# Patient Record
Sex: FEMALE | Race: WHITE | NOT HISPANIC OR LATINO | Employment: FULL TIME | URBAN - METROPOLITAN AREA
[De-identification: names, ages, dates, MRNs, and addresses within clinical notes are randomized per-mention and may not be internally consistent; named-entity substitution may affect disease eponyms.]

---

## 2024-06-27 ENCOUNTER — HOSPITAL ENCOUNTER (INPATIENT)
Facility: HOSPITAL | Age: 59
End: 2024-06-27
Attending: EMERGENCY MEDICINE | Admitting: INTERNAL MEDICINE
Payer: COMMERCIAL

## 2024-06-27 ENCOUNTER — APPOINTMENT (OUTPATIENT)
Dept: CARDIOLOGY | Facility: HOSPITAL | Age: 59
End: 2024-06-27
Payer: COMMERCIAL

## 2024-06-27 ENCOUNTER — APPOINTMENT (OUTPATIENT)
Dept: RADIOLOGY | Facility: HOSPITAL | Age: 59
End: 2024-06-27
Payer: COMMERCIAL

## 2024-06-27 DIAGNOSIS — J18.9 PRIMARY ATYPICAL PNEUMONIA: ICD-10-CM

## 2024-06-27 DIAGNOSIS — J18.9 PNEUMONIA OF LEFT LOWER LOBE DUE TO INFECTIOUS ORGANISM: ICD-10-CM

## 2024-06-27 DIAGNOSIS — R06.02 SHORTNESS OF BREATH: Primary | ICD-10-CM

## 2024-06-27 LAB
ALBUMIN SERPL BCP-MCNC: 3.1 G/DL (ref 3.4–5)
ALP SERPL-CCNC: 39 U/L (ref 33–110)
ALT SERPL W P-5'-P-CCNC: 33 U/L (ref 7–45)
ANION GAP SERPL CALC-SCNC: 11 MMOL/L (ref 10–20)
AST SERPL W P-5'-P-CCNC: 37 U/L (ref 9–39)
BASOPHILS # BLD AUTO: 0.05 X10*3/UL (ref 0–0.1)
BASOPHILS NFR BLD AUTO: 0.5 %
BILIRUB SERPL-MCNC: 0.8 MG/DL (ref 0–1.2)
BNP SERPL-MCNC: 13 PG/ML (ref 0–99)
BUN SERPL-MCNC: 18 MG/DL (ref 6–23)
CALCIUM SERPL-MCNC: 9 MG/DL (ref 8.6–10.3)
CARDIAC TROPONIN I PNL SERPL HS: 4 NG/L (ref 0–13)
CARDIAC TROPONIN I PNL SERPL HS: 4 NG/L (ref 0–13)
CHLORIDE SERPL-SCNC: 98 MMOL/L (ref 98–107)
CO2 SERPL-SCNC: 27 MMOL/L (ref 21–32)
CREAT SERPL-MCNC: 0.85 MG/DL (ref 0.5–1.05)
D DIMER PPP FEU-MCNC: 873 NG/ML FEU
EGFRCR SERPLBLD CKD-EPI 2021: 80 ML/MIN/1.73M*2
EOSINOPHIL # BLD AUTO: 0.2 X10*3/UL (ref 0–0.7)
EOSINOPHIL NFR BLD AUTO: 1.9 %
ERYTHROCYTE [DISTWIDTH] IN BLOOD BY AUTOMATED COUNT: 13.9 % (ref 11.5–14.5)
GLUCOSE SERPL-MCNC: 120 MG/DL (ref 74–99)
HCT VFR BLD AUTO: 42.5 % (ref 36–46)
HGB BLD-MCNC: 14.4 G/DL (ref 12–16)
IMM GRANULOCYTES # BLD AUTO: 0.11 X10*3/UL (ref 0–0.7)
IMM GRANULOCYTES NFR BLD AUTO: 1 % (ref 0–0.9)
INR PPP: 1.2 (ref 0.9–1.1)
LYMPHOCYTES # BLD AUTO: 1.3 X10*3/UL (ref 1.2–4.8)
LYMPHOCYTES NFR BLD AUTO: 12.2 %
MAGNESIUM SERPL-MCNC: 1.98 MG/DL (ref 1.6–2.4)
MCH RBC QN AUTO: 28.5 PG (ref 26–34)
MCHC RBC AUTO-ENTMCNC: 33.9 G/DL (ref 32–36)
MCV RBC AUTO: 84 FL (ref 80–100)
MONOCYTES # BLD AUTO: 0.94 X10*3/UL (ref 0.1–1)
MONOCYTES NFR BLD AUTO: 8.9 %
NEUTROPHILS # BLD AUTO: 8.02 X10*3/UL (ref 1.2–7.7)
NEUTROPHILS NFR BLD AUTO: 75.5 %
NRBC BLD-RTO: 0 /100 WBCS (ref 0–0)
PLATELET # BLD AUTO: 254 X10*3/UL (ref 150–450)
POTASSIUM SERPL-SCNC: 3.2 MMOL/L (ref 3.5–5.3)
PROT SERPL-MCNC: 6.5 G/DL (ref 6.4–8.2)
PROTHROMBIN TIME: 13.5 SECONDS (ref 9.8–12.8)
RBC # BLD AUTO: 5.05 X10*6/UL (ref 4–5.2)
SODIUM SERPL-SCNC: 133 MMOL/L (ref 136–145)
WBC # BLD AUTO: 10.6 X10*3/UL (ref 4.4–11.3)

## 2024-06-27 PROCEDURE — 94640 AIRWAY INHALATION TREATMENT: CPT

## 2024-06-27 PROCEDURE — 84484 ASSAY OF TROPONIN QUANT: CPT | Performed by: EMERGENCY MEDICINE

## 2024-06-27 PROCEDURE — 36415 COLL VENOUS BLD VENIPUNCTURE: CPT | Performed by: EMERGENCY MEDICINE

## 2024-06-27 PROCEDURE — 80053 COMPREHEN METABOLIC PANEL: CPT | Performed by: EMERGENCY MEDICINE

## 2024-06-27 PROCEDURE — 99285 EMERGENCY DEPT VISIT HI MDM: CPT

## 2024-06-27 PROCEDURE — 2550000001 HC RX 255 CONTRASTS: Performed by: EMERGENCY MEDICINE

## 2024-06-27 PROCEDURE — 85025 COMPLETE CBC W/AUTO DIFF WBC: CPT | Performed by: EMERGENCY MEDICINE

## 2024-06-27 PROCEDURE — 85610 PROTHROMBIN TIME: CPT | Performed by: EMERGENCY MEDICINE

## 2024-06-27 PROCEDURE — 83735 ASSAY OF MAGNESIUM: CPT

## 2024-06-27 PROCEDURE — 2500000002 HC RX 250 W HCPCS SELF ADMINISTERED DRUGS (ALT 637 FOR MEDICARE OP, ALT 636 FOR OP/ED)

## 2024-06-27 PROCEDURE — 71275 CT ANGIOGRAPHY CHEST: CPT | Performed by: STUDENT IN AN ORGANIZED HEALTH CARE EDUCATION/TRAINING PROGRAM

## 2024-06-27 PROCEDURE — 99285 EMERGENCY DEPT VISIT HI MDM: CPT | Performed by: EMERGENCY MEDICINE

## 2024-06-27 PROCEDURE — 2500000004 HC RX 250 GENERAL PHARMACY W/ HCPCS (ALT 636 FOR OP/ED)

## 2024-06-27 PROCEDURE — 93005 ELECTROCARDIOGRAM TRACING: CPT

## 2024-06-27 PROCEDURE — 71045 X-RAY EXAM CHEST 1 VIEW: CPT

## 2024-06-27 PROCEDURE — 71275 CT ANGIOGRAPHY CHEST: CPT

## 2024-06-27 PROCEDURE — 83880 ASSAY OF NATRIURETIC PEPTIDE: CPT | Performed by: EMERGENCY MEDICINE

## 2024-06-27 PROCEDURE — 85379 FIBRIN DEGRADATION QUANT: CPT | Performed by: EMERGENCY MEDICINE

## 2024-06-27 PROCEDURE — 96375 TX/PRO/DX INJ NEW DRUG ADDON: CPT

## 2024-06-27 PROCEDURE — 71045 X-RAY EXAM CHEST 1 VIEW: CPT | Performed by: RADIOLOGY

## 2024-06-27 RX ORDER — POTASSIUM CHLORIDE 20 MEQ/1
20 TABLET, EXTENDED RELEASE ORAL ONCE
Status: DISCONTINUED | OUTPATIENT
Start: 2024-06-27 | End: 2024-06-28

## 2024-06-27 RX ORDER — IPRATROPIUM BROMIDE AND ALBUTEROL SULFATE 2.5; .5 MG/3ML; MG/3ML
9 SOLUTION RESPIRATORY (INHALATION) ONCE
Status: COMPLETED | OUTPATIENT
Start: 2024-06-27 | End: 2024-06-27

## 2024-06-27 ASSESSMENT — PAIN - FUNCTIONAL ASSESSMENT: PAIN_FUNCTIONAL_ASSESSMENT: 0-10

## 2024-06-27 ASSESSMENT — PAIN SCALES - GENERAL: PAINLEVEL_OUTOF10: 0 - NO PAIN

## 2024-06-27 ASSESSMENT — COLUMBIA-SUICIDE SEVERITY RATING SCALE - C-SSRS
6. HAVE YOU EVER DONE ANYTHING, STARTED TO DO ANYTHING, OR PREPARED TO DO ANYTHING TO END YOUR LIFE?: NO
2. HAVE YOU ACTUALLY HAD ANY THOUGHTS OF KILLING YOURSELF?: NO
1. IN THE PAST MONTH, HAVE YOU WISHED YOU WERE DEAD OR WISHED YOU COULD GO TO SLEEP AND NOT WAKE UP?: NO

## 2024-06-27 ASSESSMENT — LIFESTYLE VARIABLES
HAVE YOU EVER FELT YOU SHOULD CUT DOWN ON YOUR DRINKING: NO
TOTAL SCORE: 0
EVER HAD A DRINK FIRST THING IN THE MORNING TO STEADY YOUR NERVES TO GET RID OF A HANGOVER: NO
HAVE PEOPLE ANNOYED YOU BY CRITICIZING YOUR DRINKING: NO
EVER FELT BAD OR GUILTY ABOUT YOUR DRINKING: NO

## 2024-06-28 ENCOUNTER — APPOINTMENT (OUTPATIENT)
Dept: RADIOLOGY | Facility: HOSPITAL | Age: 59
End: 2024-06-28
Payer: COMMERCIAL

## 2024-06-28 ENCOUNTER — APPOINTMENT (OUTPATIENT)
Dept: CARDIOLOGY | Facility: HOSPITAL | Age: 59
End: 2024-06-28
Payer: COMMERCIAL

## 2024-06-28 PROBLEM — J18.9 PRIMARY ATYPICAL PNEUMONIA: Status: ACTIVE | Noted: 2024-06-28

## 2024-06-28 PROBLEM — J96.01 ACUTE HYPOXEMIC RESPIRATORY FAILURE (MULTI): Status: ACTIVE | Noted: 2024-06-28

## 2024-06-28 PROBLEM — R06.02 SHORTNESS OF BREATH: Status: ACTIVE | Noted: 2024-06-28

## 2024-06-28 PROBLEM — N28.89 RENAL MASS: Status: ACTIVE | Noted: 2024-06-28

## 2024-06-28 PROBLEM — R06.00 DYSPNEA: Status: ACTIVE | Noted: 2024-06-28

## 2024-06-28 LAB
ALBUMIN SERPL BCP-MCNC: 3.3 G/DL (ref 3.4–5)
ALP SERPL-CCNC: 39 U/L (ref 33–110)
ALT SERPL W P-5'-P-CCNC: 30 U/L (ref 7–45)
ANION GAP SERPL CALC-SCNC: 13 MMOL/L (ref 10–20)
AST SERPL W P-5'-P-CCNC: 31 U/L (ref 9–39)
ATRIAL RATE: 74 BPM
ATRIAL RATE: 79 BPM
BILIRUB SERPL-MCNC: 0.6 MG/DL (ref 0–1.2)
BUN SERPL-MCNC: 14 MG/DL (ref 6–23)
CALCIUM SERPL-MCNC: 8.7 MG/DL (ref 8.6–10.3)
CHLORIDE SERPL-SCNC: 101 MMOL/L (ref 98–107)
CO2 SERPL-SCNC: 26 MMOL/L (ref 21–32)
CREAT SERPL-MCNC: 0.71 MG/DL (ref 0.5–1.05)
EGFRCR SERPLBLD CKD-EPI 2021: >90 ML/MIN/1.73M*2
ERYTHROCYTE [DISTWIDTH] IN BLOOD BY AUTOMATED COUNT: 14 % (ref 11.5–14.5)
GLUCOSE SERPL-MCNC: 168 MG/DL (ref 74–99)
HCT VFR BLD AUTO: 37.5 % (ref 36–46)
HGB BLD-MCNC: 13.7 G/DL (ref 12–16)
MCH RBC QN AUTO: 31.6 PG (ref 26–34)
MCHC RBC AUTO-ENTMCNC: 36.5 G/DL (ref 32–36)
MCV RBC AUTO: 86 FL (ref 80–100)
NRBC BLD-RTO: 0 /100 WBCS (ref 0–0)
P AXIS: 52 DEGREES
P AXIS: 60 DEGREES
P OFFSET: 198 MS
P OFFSET: 199 MS
P ONSET: 147 MS
P ONSET: 150 MS
PLATELET # BLD AUTO: 243 X10*3/UL (ref 150–450)
POTASSIUM SERPL-SCNC: 3.7 MMOL/L (ref 3.5–5.3)
PR INTERVAL: 142 MS
PR INTERVAL: 146 MS
PROT SERPL-MCNC: 6.5 G/DL (ref 6.4–8.2)
Q ONSET: 218 MS
Q ONSET: 223 MS
QRS COUNT: 12 BEATS
QRS COUNT: 13 BEATS
QRS DURATION: 76 MS
QRS DURATION: 84 MS
QT INTERVAL: 382 MS
QT INTERVAL: 392 MS
QTC CALCULATION(BAZETT): 424 MS
QTC CALCULATION(BAZETT): 449 MS
QTC FREDERICIA: 410 MS
QTC FREDERICIA: 429 MS
R AXIS: -11 DEGREES
R AXIS: -9 DEGREES
RBC # BLD AUTO: 4.34 X10*6/UL (ref 4–5.2)
SODIUM SERPL-SCNC: 136 MMOL/L (ref 136–145)
T AXIS: 18 DEGREES
T AXIS: 23 DEGREES
T OFFSET: 414 MS
T OFFSET: 414 MS
VENTRICULAR RATE: 74 BPM
VENTRICULAR RATE: 79 BPM
WBC # BLD AUTO: 7.8 X10*3/UL (ref 4.4–11.3)

## 2024-06-28 PROCEDURE — 87449 NOS EACH ORGANISM AG IA: CPT | Mod: STJLAB | Performed by: INTERNAL MEDICINE

## 2024-06-28 PROCEDURE — 2500000004 HC RX 250 GENERAL PHARMACY W/ HCPCS (ALT 636 FOR OP/ED): Performed by: INTERNAL MEDICINE

## 2024-06-28 PROCEDURE — 96361 HYDRATE IV INFUSION ADD-ON: CPT

## 2024-06-28 PROCEDURE — 74170 CT ABD WO CNTRST FLWD CNTRST: CPT | Performed by: RADIOLOGY

## 2024-06-28 PROCEDURE — 87040 BLOOD CULTURE FOR BACTERIA: CPT | Mod: STJLAB | Performed by: INTERNAL MEDICINE

## 2024-06-28 PROCEDURE — 96365 THER/PROPH/DIAG IV INF INIT: CPT | Mod: 59

## 2024-06-28 PROCEDURE — 2500000001 HC RX 250 WO HCPCS SELF ADMINISTERED DRUGS (ALT 637 FOR MEDICARE OP): Performed by: EMERGENCY MEDICINE

## 2024-06-28 PROCEDURE — 96375 TX/PRO/DX INJ NEW DRUG ADDON: CPT

## 2024-06-28 PROCEDURE — 85027 COMPLETE CBC AUTOMATED: CPT | Performed by: INTERNAL MEDICINE

## 2024-06-28 PROCEDURE — 2580000001 HC RX 258 IV SOLUTIONS: Performed by: INTERNAL MEDICINE

## 2024-06-28 PROCEDURE — 2550000001 HC RX 255 CONTRASTS: Performed by: INTERNAL MEDICINE

## 2024-06-28 PROCEDURE — 94640 AIRWAY INHALATION TREATMENT: CPT

## 2024-06-28 PROCEDURE — 2500000002 HC RX 250 W HCPCS SELF ADMINISTERED DRUGS (ALT 637 FOR MEDICARE OP, ALT 636 FOR OP/ED): Performed by: INTERNAL MEDICINE

## 2024-06-28 PROCEDURE — 2500000004 HC RX 250 GENERAL PHARMACY W/ HCPCS (ALT 636 FOR OP/ED): Performed by: EMERGENCY MEDICINE

## 2024-06-28 PROCEDURE — 74170 CT ABD WO CNTRST FLWD CNTRST: CPT

## 2024-06-28 PROCEDURE — 93005 ELECTROCARDIOGRAM TRACING: CPT

## 2024-06-28 PROCEDURE — 1200000002 HC GENERAL ROOM WITH TELEMETRY DAILY

## 2024-06-28 PROCEDURE — 99223 1ST HOSP IP/OBS HIGH 75: CPT | Performed by: INTERNAL MEDICINE

## 2024-06-28 PROCEDURE — 2500000004 HC RX 250 GENERAL PHARMACY W/ HCPCS (ALT 636 FOR OP/ED)

## 2024-06-28 PROCEDURE — 2500000005 HC RX 250 GENERAL PHARMACY W/O HCPCS

## 2024-06-28 PROCEDURE — 80053 COMPREHEN METABOLIC PANEL: CPT | Performed by: INTERNAL MEDICINE

## 2024-06-28 PROCEDURE — 87899 AGENT NOS ASSAY W/OPTIC: CPT | Mod: STJLAB | Performed by: INTERNAL MEDICINE

## 2024-06-28 RX ORDER — SODIUM CHLORIDE AND POTASSIUM CHLORIDE 300; 900 MG/100ML; MG/100ML
75 INJECTION, SOLUTION INTRAVENOUS CONTINUOUS
Status: DISPENSED | OUTPATIENT
Start: 2024-06-28 | End: 2024-06-28

## 2024-06-28 RX ORDER — IPRATROPIUM BROMIDE AND ALBUTEROL SULFATE 2.5; .5 MG/3ML; MG/3ML
3 SOLUTION RESPIRATORY (INHALATION)
Status: DISCONTINUED | OUTPATIENT
Start: 2024-06-28 | End: 2024-06-30

## 2024-06-28 RX ORDER — PANTOPRAZOLE SODIUM 40 MG/10ML
40 INJECTION, POWDER, LYOPHILIZED, FOR SOLUTION INTRAVENOUS
Status: DISPENSED | OUTPATIENT
Start: 2024-06-28

## 2024-06-28 RX ORDER — ACETAMINOPHEN 325 MG/1
650 TABLET ORAL EVERY 4 HOURS PRN
Status: ACTIVE | OUTPATIENT
Start: 2024-06-28

## 2024-06-28 RX ORDER — ACETAMINOPHEN 160 MG/5ML
650 SOLUTION ORAL EVERY 4 HOURS PRN
Status: ACTIVE | OUTPATIENT
Start: 2024-06-28

## 2024-06-28 RX ORDER — TALC
3 POWDER (GRAM) TOPICAL NIGHTLY PRN
Status: ACTIVE | OUTPATIENT
Start: 2024-06-28

## 2024-06-28 RX ORDER — CEFTRIAXONE 1 G/50ML
1 INJECTION, SOLUTION INTRAVENOUS EVERY 24 HOURS
Status: DISPENSED | OUTPATIENT
Start: 2024-06-29

## 2024-06-28 RX ORDER — ENOXAPARIN SODIUM 100 MG/ML
40 INJECTION SUBCUTANEOUS EVERY 24 HOURS
Status: DISPENSED | OUTPATIENT
Start: 2024-06-28

## 2024-06-28 RX ORDER — PANTOPRAZOLE SODIUM 40 MG/1
40 TABLET, DELAYED RELEASE ORAL
Status: DISPENSED | OUTPATIENT
Start: 2024-06-28

## 2024-06-28 RX ORDER — ONDANSETRON HYDROCHLORIDE 2 MG/ML
4 INJECTION, SOLUTION INTRAVENOUS EVERY 8 HOURS PRN
Status: ACTIVE | OUTPATIENT
Start: 2024-06-28

## 2024-06-28 RX ORDER — CEFTRIAXONE 1 G/50ML
1 INJECTION, SOLUTION INTRAVENOUS ONCE
Status: COMPLETED | OUTPATIENT
Start: 2024-06-28 | End: 2024-06-28

## 2024-06-28 RX ORDER — LORAZEPAM 2 MG/ML
2 INJECTION INTRAMUSCULAR ONCE
Status: DISCONTINUED | OUTPATIENT
Start: 2024-06-28 | End: 2024-06-28

## 2024-06-28 RX ORDER — POTASSIUM CHLORIDE 20 MEQ/1
40 TABLET, EXTENDED RELEASE ORAL ONCE
Status: DISPENSED | OUTPATIENT
Start: 2024-06-28

## 2024-06-28 RX ORDER — GUAIFENESIN/DEXTROMETHORPHAN 100-10MG/5
5 SYRUP ORAL EVERY 4 HOURS PRN
Status: DISPENSED | OUTPATIENT
Start: 2024-06-28

## 2024-06-28 RX ORDER — ACETAMINOPHEN 650 MG/1
650 SUPPOSITORY RECTAL EVERY 4 HOURS PRN
Status: ACTIVE | OUTPATIENT
Start: 2024-06-28

## 2024-06-28 RX ORDER — METOCLOPRAMIDE 10 MG/1
10 TABLET ORAL EVERY 6 HOURS PRN
Status: ACTIVE | OUTPATIENT
Start: 2024-06-28

## 2024-06-28 RX ORDER — METOCLOPRAMIDE HYDROCHLORIDE 5 MG/ML
10 INJECTION INTRAMUSCULAR; INTRAVENOUS EVERY 6 HOURS PRN
Status: ACTIVE | OUTPATIENT
Start: 2024-06-28

## 2024-06-28 RX ORDER — CODEINE PHOSPHATE AND GUAIFENESIN 10; 100 MG/5ML; MG/5ML
5 SOLUTION ORAL EVERY 4 HOURS PRN
Status: DISPENSED | OUTPATIENT
Start: 2024-06-28

## 2024-06-28 RX ORDER — POTASSIUM CHLORIDE 1.5 G/1.58G
20 POWDER, FOR SOLUTION ORAL ONCE
Status: COMPLETED | OUTPATIENT
Start: 2024-06-28 | End: 2024-06-28

## 2024-06-28 RX ORDER — ONDANSETRON 4 MG/1
4 TABLET, ORALLY DISINTEGRATING ORAL EVERY 8 HOURS PRN
Status: ACTIVE | OUTPATIENT
Start: 2024-06-28

## 2024-06-28 RX ORDER — POLYETHYLENE GLYCOL 3350 17 G/17G
17 POWDER, FOR SOLUTION ORAL DAILY PRN
Status: ACTIVE | OUTPATIENT
Start: 2024-06-28

## 2024-06-28 SDOH — SOCIAL STABILITY: SOCIAL INSECURITY: ARE THERE ANY APPARENT SIGNS OF INJURIES/BEHAVIORS THAT COULD BE RELATED TO ABUSE/NEGLECT?: NO

## 2024-06-28 SDOH — SOCIAL STABILITY: SOCIAL INSECURITY: DO YOU FEEL ANYONE HAS EXPLOITED OR TAKEN ADVANTAGE OF YOU FINANCIALLY OR OF YOUR PERSONAL PROPERTY?: NO

## 2024-06-28 SDOH — SOCIAL STABILITY: SOCIAL INSECURITY: ARE YOU OR HAVE YOU BEEN THREATENED OR ABUSED PHYSICALLY, EMOTIONALLY, OR SEXUALLY BY ANYONE?: NO

## 2024-06-28 SDOH — SOCIAL STABILITY: SOCIAL INSECURITY: HAS ANYONE EVER THREATENED TO HURT YOUR FAMILY OR YOUR PETS?: NO

## 2024-06-28 SDOH — SOCIAL STABILITY: SOCIAL INSECURITY: DO YOU FEEL UNSAFE GOING BACK TO THE PLACE WHERE YOU ARE LIVING?: NO

## 2024-06-28 SDOH — SOCIAL STABILITY: SOCIAL INSECURITY: ABUSE: ADULT

## 2024-06-28 SDOH — SOCIAL STABILITY: SOCIAL INSECURITY: DOES ANYONE TRY TO KEEP YOU FROM HAVING/CONTACTING OTHER FRIENDS OR DOING THINGS OUTSIDE YOUR HOME?: NO

## 2024-06-28 SDOH — SOCIAL STABILITY: SOCIAL INSECURITY: HAVE YOU HAD THOUGHTS OF HARMING ANYONE ELSE?: NO

## 2024-06-28 SDOH — SOCIAL STABILITY: SOCIAL INSECURITY: WERE YOU ABLE TO COMPLETE ALL THE BEHAVIORAL HEALTH SCREENINGS?: YES

## 2024-06-28 SDOH — SOCIAL STABILITY: SOCIAL INSECURITY: HAVE YOU HAD ANY THOUGHTS OF HARMING ANYONE ELSE?: NO

## 2024-06-28 ASSESSMENT — COGNITIVE AND FUNCTIONAL STATUS - GENERAL
WALKING IN HOSPITAL ROOM: A LITTLE
MOBILITY SCORE: 23
CLIMB 3 TO 5 STEPS WITH RAILING: A LITTLE
DAILY ACTIVITIY SCORE: 24
MOBILITY SCORE: 22
WALKING IN HOSPITAL ROOM: A LITTLE
WALKING IN HOSPITAL ROOM: A LITTLE
CLIMB 3 TO 5 STEPS WITH RAILING: A LITTLE
MOBILITY SCORE: 22
DAILY ACTIVITIY SCORE: 24
CLIMB 3 TO 5 STEPS WITH RAILING: A LITTLE
CLIMB 3 TO 5 STEPS WITH RAILING: A LITTLE
DAILY ACTIVITIY SCORE: 24
MOBILITY SCORE: 22
DAILY ACTIVITIY SCORE: 24
PATIENT BASELINE BEDBOUND: NO

## 2024-06-28 ASSESSMENT — LIFESTYLE VARIABLES
SKIP TO QUESTIONS 9-10: 1
HOW MANY STANDARD DRINKS CONTAINING ALCOHOL DO YOU HAVE ON A TYPICAL DAY: PATIENT DOES NOT DRINK
HOW OFTEN DO YOU HAVE A DRINK CONTAINING ALCOHOL: NEVER
AUDIT-C TOTAL SCORE: 0
AUDIT-C TOTAL SCORE: 0
HOW OFTEN DO YOU HAVE 6 OR MORE DRINKS ON ONE OCCASION: NEVER

## 2024-06-28 ASSESSMENT — ACTIVITIES OF DAILY LIVING (ADL)
TOILETING: INDEPENDENT
WALKS IN HOME: INDEPENDENT
HEARING - RIGHT EAR: FUNCTIONAL
BATHING: INDEPENDENT
HEARING - LEFT EAR: FUNCTIONAL
PATIENT'S MEMORY ADEQUATE TO SAFELY COMPLETE DAILY ACTIVITIES?: YES
DRESSING YOURSELF: INDEPENDENT
FEEDING YOURSELF: INDEPENDENT
ADEQUATE_TO_COMPLETE_ADL: YES
JUDGMENT_ADEQUATE_SAFELY_COMPLETE_DAILY_ACTIVITIES: YES
LACK_OF_TRANSPORTATION: NO
GROOMING: INDEPENDENT

## 2024-06-28 ASSESSMENT — PATIENT HEALTH QUESTIONNAIRE - PHQ9
1. LITTLE INTEREST OR PLEASURE IN DOING THINGS: NOT AT ALL
2. FEELING DOWN, DEPRESSED OR HOPELESS: NOT AT ALL
SUM OF ALL RESPONSES TO PHQ9 QUESTIONS 1 & 2: 0

## 2024-06-28 ASSESSMENT — ENCOUNTER SYMPTOMS
SHORTNESS OF BREATH: 1
COUGH: 1

## 2024-06-28 ASSESSMENT — PAIN - FUNCTIONAL ASSESSMENT
PAIN_FUNCTIONAL_ASSESSMENT: 0-10
PAIN_FUNCTIONAL_ASSESSMENT: 0-10

## 2024-06-28 ASSESSMENT — PAIN SCALES - GENERAL
PAINLEVEL_OUTOF10: 0 - NO PAIN

## 2024-06-28 NOTE — SIGNIFICANT EVENT
Patient pulse ox in 80s on Ra patient complains of sob. Patient placed on 3l of oxygen via nc pulse ox up to 93% patient no longer complains of sob

## 2024-06-28 NOTE — ED PROVIDER NOTES
HPI   Chief Complaint   Patient presents with   • Shortness of Breath     Tested + for covid last week, endorsing SOB since Thursday; states in triage that she has had constant chest pressure that started Thursday as well         This is a 58 years old female patient presented to the emergency department with complaint of difficulty breathing/shortness of breath and constant chest pressure as if someone is sitting on her chest.  Symptoms started on Thursday.  She tested positive for COVID-19 a week ago.  Denies any headache, lightheadedness, dizziness, fever, chills, body aches, abdominal pain, diarrhea, constipation, dysuria, urgency, frequency, weakness or focal numbness.  She reported chest congestion.  Patient also reported feeling generally weak and having very low energy.    Review of system: As stated above in the HPI section.                              Allan Coma Scale Score: 15                     Patient History   History reviewed. No pertinent past medical history.  History reviewed. No pertinent surgical history.  No family history on file.  Social History     Tobacco Use   • Smoking status: Every Day     Types: Cigarettes   • Smokeless tobacco: Never   Vaping Use   • Vaping status: Never Used   Substance Use Topics   • Alcohol use: Yes     Comment: socially   • Drug use: Yes     Types: Marijuana       Physical Exam   ED Triage Vitals   Temperature Heart Rate Respirations BP   06/27/24 2105 06/27/24 2105 06/27/24 2105 06/27/24 2105   35.7 °C (96.3 °F) 78 20 109/61      Pulse Ox Temp Source Heart Rate Source Patient Position   06/27/24 2105 06/27/24 2105 -- 06/27/24 2200   95 % Temporal  Sitting      BP Location FiO2 (%)     -- --             Physical Exam  Vitals and nursing note reviewed.   Constitutional:       General: She is not in acute distress.     Appearance: She is well-developed.   HENT:      Head: Normocephalic and atraumatic.   Eyes:      Conjunctiva/sclera: Conjunctivae normal.    Cardiovascular:      Rate and Rhythm: Normal rate and regular rhythm.      Heart sounds: No murmur heard.  Pulmonary:      Effort: Pulmonary effort is normal. No respiratory distress.      Breath sounds: Examination of the right-lower field reveals wheezing and rhonchi. Examination of the left-lower field reveals wheezing and rhonchi. Wheezing and rhonchi present.   Abdominal:      Palpations: Abdomen is soft.      Tenderness: There is no abdominal tenderness.   Musculoskeletal:         General: No swelling.      Cervical back: Neck supple.   Skin:     General: Skin is warm and dry.      Capillary Refill: Capillary refill takes less than 2 seconds.   Neurological:      Mental Status: She is alert.   Psychiatric:         Mood and Affect: Mood normal.         ED Course & MDM   ED Course as of 06/28/24 0103 Fri Jun 28, 2024   0012 CBC with Differential(!) [SC]   0013 ERYTHROCYTES (10*6/UL) IN BLOOD BY AUTOMATED COUNT, [place]: 5.05 [SC]   0013 Brain Natriuretic Peptide [SC]   0013 CBC with Differential(!) [SC]   0013 LEUKOCYTES (10*3/UL) IN BLOOD BY AUTOMATED COUNT, [place]: 10.6 [SC]   0013 ERYTHROCYTES (10*6/UL) IN BLOOD BY AUTOMATED COUNT, [place]: 5.05 [SC]   0013 HEMATOCRIT: 42.5 [SC]   0013 MCH: 28.5 [SC]   0013 MCHC: 33.9 [SC]   0013 RED CELL DISTRIBUTION WIDTH: 13.9 [SC]   0013 NEUTROPHILS/100 LEUKOCYTES IN BLOOD BY AUTOMATED COUNT, [place]: 75.5 [SC]   0013 INR(!): 1.2 [SC]   0013 Protime(!): 13.5 [SC]   0013 GLUCOSE(!): 120 [SC]   0013 SODIUM(!): 133 [SC]   0013 Anion Gap: 11 [SC]   0013 Troponin I, High Sensitivity: 4 [SC]      ED Course User Index  [SC] Nia Farmer MD         Diagnoses as of 06/28/24 0103   Shortness of breath       Medical Decision Making  Patient is seen and examined.  Given the recent positive COVID-19 status as well as the chest pain and shortness of breath will obtain full cardiac workup including D-dimer.  D-dimer is elevated.  Will proceed with a CT PE protocol.   Meanwhile we will administer breathing treatment and steroids. Will replenish hypokalemia and will administer IV fluids.  Patient is requiring 4 L of oxygen by nasal cannula which is new.  CT chest revealed finding that could be related to consolidation/pneumonia, pulmonary infarction and the study was not optimal for PE.  Incidentally renal mass of 3.5 cm is noted.  Given the patient's generalized weakness, new oxygen requirement we will admit the patient after we start the patient on Rocephin, azithromycin.  Patient is admitted in stable condition        Procedure  Procedures     Sidney Jones DO  06/28/24 0103

## 2024-06-28 NOTE — CARE PLAN
Problem: Fall/Injury  Goal: Not fall by end of shift  Outcome: Progressing     Problem: Respiratory  Goal: Patent airway maintained this shift  Outcome: Progressing    The patient's goals for the shift include      The clinical goals for the shift include maintain SpO2 > 92% on 4L NC throughout end of shift

## 2024-06-28 NOTE — CARE PLAN
Problem: Respiratory  Goal: Minimal/no exertional discomfort or dyspnea this shift  6/28/2024 1726 by Tia Cowan RN  Outcome: Progressing  6/28/2024 1004 by Tia Cowan RN  Flowsheets (Taken 6/28/2024 1004)  Minimal/no exertional discomfort or dyspnea this shift: Positioning to promote ventilation/comfort  Goal: No signs of respiratory distress (eg. Use of accessory muscles. Peds grunting)  Outcome: Progressing  Goal: Verbalize decreased shortness of breath this shift  Outcome: Progressing  Goal: Wean oxygen to maintain O2 saturation per order/standard this shift  Outcome: Progressing     Problem: Fall/Injury  Goal: Not fall by end of shift  Outcome: Met   The patient's goals for the shift include      The clinical goals for the shift include care plan    Over the shift, the patient did make progress toward the following goals.

## 2024-06-28 NOTE — NURSING NOTE
0740 Patient being transferred of floor for ct of kidney   0810 patient returned to unit   1028 patient wanting to eat, asking if pulmonology will see her soon.  secure message sent to Dr. Ng regarding if there is plans for bronchoscopy for this patient for this shift, and if patient should remain npo,  awaiting response from Dr. Ng.   9853 Dr. Mcmahon placed diet order for patient since bronchoscopy would have to be planned in advance.

## 2024-06-28 NOTE — CONSULTS
Pulmonary Disease Consult    PATIENT NAME: Kika Marcelino    MRN: [id number]  SERVICE DATE:  6/28/2024   SERVICE TIME:  4:59 PM        PRIMARY CARE PHYSICIAN: No Assigned PCP Generic MD Vitaliy  REASON FOR CONSULT: atypical PNA - recent COVID. Consolidation with reverse halo sign - fungal vs mycobacterial pna?       ASSESSMENT :   Acute hypoxic respiratory failure  Multifocal lobar pneumonia  Left lower lobe large consolidation with halo sign with multiple differential diagnosis as per radiology report  Tobacco use disorder with signs of bilateral wheezing, suggestive of coexisting obstructive airway disease  However no definite evidence of cavity or necrotizing pneumonia yet  COVID-pneumonia  Patient is not immune compromised    Plan  Continue with bronchodilators DuoNeb scheduled dose  Continue the current antibiotics as per ID  Continue with oxygen to keep O2 sat 89 to 92% all the time  Patient is presenting with acute respiratory illness that is more suggestive of infectious etiology at this time  There is no necrotizing or cavitation that necessitate further invasive procedures at this time  Will continue with antibiotics  Will consider adding steroids after sputum cultures    Thanks for consult we will follow-up    1. Large reverse-halo sign airspace disease (consolidative opacity  with central ground-glass opacity) in the lingula. Differential  diagnosis consists of pulmonary infarct, fungal pneumonia (most  commonly mucormycosis), other pneumonia including COVID-19 and  bacterial pneumonia amongst others. Note that the there is  nondiagnostic evaluation for pulmonary embolism within the lingula  secondary to extensive cardiac and respiratory motion artifact. There  is also nondiagnostic evaluation for pulmonary embolism at and distal  to the segmental level in the right middle lobe to the same.  Otherwise, no acute pulmonary embolism through the segmental level  elsewhere.      2. Diffuse centrilobular  ground-glass nodules and opacities within  the upper lobes and patchy involvement in the lower lobes (left >  right) that is highly atypical for COVID-19 and is most suggestive in  pattern and distribution for bacterial pneumonia or atypical  organisms such as mycobacterial. Integration of the clinical context,  physical exam/laboratory findings, and imaging is recommended.      3. Suspicion for solid enhancing right renal mass measuring 3.3 cm x  3.1 cm; however, this is only partially visualized and suboptimally  evaluated. Dedicated contrast enhanced MRI of the kidneys is  recommended for further evaluation. YELLOW ALERT: An incidental  finding alert was sent per protocol.      4. Cholelithiasis without evidence of acute cholecystitis.      MACRO:  Critical Finding:  New mass in the kidney. Notification was initiated  on 6/28/2024 at 12:35 am by  Wicho Dailey.  (**-YCF-**)  Instructions:  MR Abdomen w and wo IV contrast. 1 week.      Signed by: Wicho Dailey 6/28/2024 12:36 AM      HPI    Patient Kika is a 58-year-old female who is a smoker, including marijuana, she has office work, she denied having any history of underlying chronic lung disease or reflux disease.  She endorses a positive COVID-19 diagnosis 1 week from the day of admission. States that she began to experience symptoms including cough, malaise, chest congestion with coworkers having tested positive as well as their direct family members. She states that she took 2 home COVID-19 tests, both of which were noted to be positive.  Upon presentation to the ED she was found to be hypoxic and placed on oxygen.  CT chest was done and showed multilobar pneumonia and some local consolidation in the left lower lobe with halo sign and consult requested.  Her illness is around 7 to 8 days total otherwise she denied any underlying subacute or chronic respiratory symptoms.  No history of occupational or environmental exposure.  No history of autoimmune  or connective tissue disease.  She is not immune compromised.  She has hoarseness of voice for the last few days which could be contributing to reflux disease even though she denied heartburn or choking or aspiration with food.  She denied postnasal drip or sore throat  No fever no chills now but she had earlier in the course of her illness    PAST MEDICAL HISTORY:   Patient denied any other comorbidities  PAST SURGICAL HISTORY:       FAMILY HISTORY:   No history of chronic lung disease    SOCIAL HISTORY:   Social History     Tobacco Use    Smoking status: Every Day     Types: Cigarettes    Smokeless tobacco: Never   Vaping Use    Vaping status: Never Used   Substance Use Topics    Alcohol use: Yes     Comment: socially    Drug use: Yes     Types: Marijuana     CURRENT ALLERGIES:   Allergies as of 06/27/2024    (No Known Allergies)       MEDICATIONS:  Scheduled medications  [START ON 6/29/2024] azithromycin, 500 mg, intravenous, q24h  [START ON 6/29/2024] cefTRIAXone, 1 g, intravenous, q24h  enoxaparin, 40 mg, subcutaneous, q24h  ipratropium-albuteroL, 3 mL, nebulization, q4h  pantoprazole, 40 mg, oral, Daily before breakfast   Or  pantoprazole, 40 mg, intravenous, Daily before breakfast  potassium chloride CR, 40 mEq, oral, Once      Continuous medications  potassium chloride in 0.9%NaCl, 75 mL/hr, Last Rate: 75 mL/hr (06/28/24 0837)      PRN medications  PRN medications: acetaminophen **OR** acetaminophen **OR** acetaminophen, acetaminophen **OR** acetaminophen **OR** acetaminophen, codeine-guaifenesin, dextromethorphan-guaifenesin, melatonin, metoclopramide **OR** metoclopramide, ondansetron ODT **OR** ondansetron, oxygen, polyethylene glycol      ROS  Review of Systems      Physical Exam  Vitals reviewed.   Constitutional:       Appearance: Normal appearance. She is obese.  On oxygen continuously  HENT:      Head: Normocephalic and atraumatic.   Eyes:      Extraocular Movements: Extraocular movements intact.      " Pupils: Pupils are equal, round, and reactive to light.   Cardiovascular:      Rate and Rhythm: Normal rate and regular rhythm.      Heart sounds: No murmur heard.  Pulmonary:      Effort: Mild respiratory distress present.      Breath sounds: No stridor. Wheezing and rhonchi present.   Chest:      Chest wall: No tenderness.   Abdominal:      General: Bowel sounds are normal. There is no distension.      Palpations: There is no mass.      Tenderness: There is no abdominal tenderness. There is no rebound.   Musculoskeletal:         General: Swelling present. No tenderness or deformity.      Cervical back: No rigidity.   Lymphadenopathy:      Cervical: No cervical adenopathy.   Skin:     General: Skin is warm.      Coloration: Skin is pale. Skin is not jaundiced.      Findings: No bruising.   Neurological:      General: No focal deficit present.      Mental Status: She is alert and oriented to person, place, and time. Mental status is at baseline.      Cranial Nerves: No cranial nerve deficit.      Motor: Weakness present.   Psychiatric:         Mood and Affect: Mood normal.         Behavior: Behavior normal.     Patient Vitals for the past 24 hrs:   BP Temp Temp src Pulse Resp SpO2 Height Weight   06/28/24 1621 -- -- -- -- -- 95 % -- --   06/28/24 1600 130/87 36.6 °C (97.9 °F) Temporal 69 18 96 % -- --   06/28/24 1229 -- -- -- -- -- 96 % -- --   06/28/24 1200 (!) 146/103 36.6 °C (97.9 °F) Temporal 64 18 96 % -- --   06/28/24 1019 140/82 -- -- -- -- -- -- --   06/28/24 0800 (!) 142/95 36.4 °C (97.5 °F) Temporal 64 18 98 % -- --   06/28/24 0327 -- -- -- -- -- 97 % -- --   06/28/24 0300 113/81 36 °C (96.8 °F) Temporal 84 17 97 % 1.651 m (5' 5\") 99.4 kg (219 lb 2.2 oz)   06/28/24 0200 149/79 -- -- 82 (!) 22 95 % -- --   06/28/24 0130 148/84 -- -- 82 20 94 % -- --   06/28/24 0000 155/83 -- -- 96 20 94 % -- --   06/27/24 2300 165/80 -- -- 82 20 94 % -- --   06/27/24 2258 -- -- -- -- -- 94 % -- --   06/27/24 2200 106/73 -- " "-- 72 20 96 % -- --   06/27/24 2108 -- -- -- -- -- 97 % -- --   06/27/24 2105 109/61 35.7 °C (96.3 °F) Temporal 78 20 95 % 1.651 m (5' 5\") 97.5 kg (215 lb)     Body mass index is 36.47 kg/m².      Gen: NAD  Neck: symmetric, no mass  Cardiovascular: RRR  Respiratory: No distress   GI: Abd soft, nontender, non-distended  Extremities: no leg edema  Skin: Warm and dry.  Neuro: alert and oriented times 3  : no mcnally     Labs:  Lab Results   Component Value Date    WBC 7.8 06/28/2024    HGB 13.7 06/28/2024    HCT 37.5 06/28/2024    MCV 86 06/28/2024     06/28/2024     Lab Results   Component Value Date    GLUCOSE 168 (H) 06/28/2024    CALCIUM 8.7 06/28/2024     06/28/2024    K 3.7 06/28/2024    CO2 26 06/28/2024     06/28/2024    BUN 14 06/28/2024    CREATININE 0.71 06/28/2024   ESR: --No results found for: \"SEDRATE\"No results found for: \"CRP\"  Lab Results   Component Value Date    ALT 30 06/28/2024    AST 31 06/28/2024    ALKPHOS 39 06/28/2024    BILITOT 0.6 06/28/2024   @AB@      DATA:   Diagnostic tests reviewed for today's visit:    Labs this admission reviewed  Imagings this admission reviewed  Cultures: Reviewed    CT kidney w and wo IV contrast   Final Result   Right renal lesion incompletely included in the field of view on CT   chest with contrast 27 June 2024 is confirmed an enhancing neoplasm   on today's CT kidney        It is new from CT abdomen and pelvis 20 April 2022        It does not contain fat or calcification        It is confined within the perirenal fat, without any suggestion of   invasion of the main vein        It is not particularly necrotic in appearance but does have   heterogeneous enhancement        No sign of active metastatic disease in the abdomen        The included lower chest is abnormal but unchanged from yesterday's   chest CT        MACRO:   None        Signed by: Hayder Palomino 6/28/2024 8:27 AM   Dictation workstation:   [id number]      CT angio chest for " pulmonary embolism   Final Result   1. Large reverse-halo sign airspace disease (consolidative opacity   with central ground-glass opacity) in the lingula. Differential   diagnosis consists of pulmonary infarct, fungal pneumonia (most   commonly mucormycosis), other pneumonia including COVID-19 and   bacterial pneumonia amongst others. Note that the there is   nondiagnostic evaluation for pulmonary embolism within the lingula   secondary to extensive cardiac and respiratory motion artifact. There   is also nondiagnostic evaluation for pulmonary embolism at and distal   to the segmental level in the right middle lobe to the same.   Otherwise, no acute pulmonary embolism through the segmental level   elsewhere.        2. Diffuse centrilobular ground-glass nodules and opacities within   the upper lobes and patchy involvement in the lower lobes (left >   right) that is highly atypical for COVID-19 and is most suggestive in   pattern and distribution for bacterial pneumonia or atypical   organisms such as mycobacterial. Integration of the clinical context,   physical exam/laboratory findings, and imaging is recommended.        3. Suspicion for solid enhancing right renal mass measuring 3.3 cm x   3.1 cm; however, this is only partially visualized and suboptimally   evaluated. Dedicated contrast enhanced MRI of the kidneys is   recommended for further evaluation. YELLOW ALERT: An incidental   finding alert was sent per protocol.        4. Cholelithiasis without evidence of acute cholecystitis.        MACRO:   Critical Finding:  New mass in the kidney. Notification was initiated   on 6/28/2024 at 12:35 am by  Wicho Dailey.  (**-YCF-**)   Instructions:  MR Abdomen w and wo IV contrast. 1 week.        Signed by: Wicho Dailey 6/28/2024 12:36 AM   Dictation workstation:   [id number]      XR chest 1 view   Final Result   Left lower lobe infiltrate. Follow-up to complete resolution   recommended.        Signed by: Kylah  Shabnam 6/27/2024 10:15 PM   Dictation workstation:   [id number]               Will continue to follow     Thank you so much for this consultation     Will Ng MD

## 2024-06-28 NOTE — PROGRESS NOTES
06/28/24 1532   Discharge Planning   Living Arrangements Alone   Support Systems Family members   Type of Residence Private residence   Home or Post Acute Services None   Patient expects to be discharged to: SW called and spoke with pt over phone. Pt confirmed demographic information is correct. She does not use any DME and states that she absolutely understands how to manage her health at home.   Financial Resource Strain   How hard is it for you to pay for the very basics like food, housing, medical care, and heating? Not hard   Housing Stability   In the last 12 months, was there a time when you were not able to pay the mortgage or rent on time? N   In the last 12 months, was there a time when you did not have a steady place to sleep or slept in a shelter (including now)? N

## 2024-06-28 NOTE — CONSULTS
Consults  Referred by Issac Manzano, for Dr. Wesley, for whom I am covering    Primary MD: No Assigned PCP Generic Provider, MD    Reason For Consult  Atypical pneumonia-recent COVID.  Consolidation with reverse halo sign-fungal or mycobacterial pneumonia?    History Of Present Illness  Kika Marcelino is a 58 y.o. female presenting with dyspnea and a cough going on for a week.  A week previously this patient tested positive for COVID, with a cough, and multiple coworkers and family members having COVID as well.    The patient comes in and indeed is hypoxic, requires 4 L of oxygen in the emergency room to oxygenate adequately.  This is down to 2-1/2 L now.  She is afebrile with a normal white count, has a plain x-ray showing what appears to be a left lower lobe infiltrate, and then a CAT scan of the chest which shows a large reverse halo sign airspace disease in the lingula as well as patchy infiltrates elsewhere.    She has a CAT scan of the abdomen which shows hypoattenuation of the liver consistent with steatosis, cholelithiasis and a 3.3 x 3.1 cm heterogenous enhancing mass in the right kidney.    The patient is a cigarette smoker but has no other chronic health conditions.    The patient is treated with ceftriaxone and azithromycin 1 dose each at around 1 to 2 AM, each ordered once.  Pulmonary has been consulted and they have kept her n.p.o. in case bronchoscopy is necessary, and they have just heard that bronchoscopy is not necessary so they will been allowing her to eat.  She has lost her appetite for the last week but is hungry today.     Past Medical History  She has no past medical history on file.    Surgical History  She has no past surgical history on file.     Social History     Occupational History    Not on file   Tobacco Use    Smoking status: Every Day     Types: Cigarettes    Smokeless tobacco: Never   Vaping Use    Vaping status: Never Used   Substance and Sexual Activity    Alcohol use: Yes      Comment: socially    Drug use: Yes     Types: Marijuana    Sexual activity: Not on file     Travel History   Travel since 05/28/24    No documented travel since 05/28/24              Family History  No family history on file.  Allergies  Patient has no known allergies.     Immunization History   Administered Date(s) Administered    Moderna SARS-CoV-2 Vaccination 04/14/2021, 05/14/2021     Medications  Home medications:  No medications prior to admission.     Current medications:  Scheduled medications  [START ON 6/29/2024] azithromycin, 500 mg, intravenous, q24h  [START ON 6/29/2024] cefTRIAXone, 1 g, intravenous, q24h  enoxaparin, 40 mg, subcutaneous, q24h  ipratropium-albuteroL, 3 mL, nebulization, q4h  pantoprazole, 40 mg, oral, Daily before breakfast   Or  pantoprazole, 40 mg, intravenous, Daily before breakfast  potassium chloride CR, 40 mEq, oral, Once      Continuous medications  potassium chloride in 0.9%NaCl, 75 mL/hr, Last Rate: 75 mL/hr (06/28/24 0837)      PRN medications  PRN medications: acetaminophen **OR** acetaminophen **OR** acetaminophen, acetaminophen **OR** acetaminophen **OR** acetaminophen, codeine-guaifenesin, dextromethorphan-guaifenesin, melatonin, metoclopramide **OR** metoclopramide, ondansetron ODT **OR** ondansetron, oxygen, polyethylene glycol    Review of Systems  Review of systems shows no findings other than what is described in the narrative above.  Specifically, the patient has no significant changes in eyesight, no sore throat, no post nasal drip, no ear pains.  There's been no chest pains, pleuritic or otherwise.  There has been no abdominal pains, no nausea or vomiting, nor diarrhea.  There has been no dysuria, urinary frequency, or flank pain.  There has been nothing else unusual in the joints or muscles, or any skin rashes or skin swellings or abscesses noted.  All other systems were reviewed and are negative.    Objective  Range of Vitals (last 24 hours)  Heart Rate:   "[64-96]   Temp:  [35.7 °C (96.3 °F)-36.6 °C (97.9 °F)]   Resp:  [17-22]   BP: (106-165)/()   Height:  [165.1 cm (5' 5\")]   Weight:  [97.5 kg (215 lb)-99.4 kg (219 lb 2.2 oz)]   SpO2:  [94 %-98 %]   Daily Weight  06/28/24 : 99.4 kg (219 lb 2.2 oz)    Body mass index is 36.47 kg/m².     Physical Exam  General:  awake alert and oriented ×3  HEENT:  pharynx unremarkable, thyroid not palpable, no lymphadenopathy  Lungs: clear to auscultation anteriorly and posteriorly  Heart: regular rate and rhythm, normal heart sounds, no murmur rub or gallop  Abdomen: soft, nontender, no organomegaly  MS/skin:  no joint swelling/tenderness, no lesions, no rash, no edema  Relevant Results  Outside Hospital Results    Labs  Results from last 72 hours   Lab Units 06/28/24  0736 06/27/24 2117   WBC AUTO x10*3/uL 7.8 10.6   HEMOGLOBIN g/dL 13.7 14.4   HEMATOCRIT % 37.5 42.5   PLATELETS AUTO x10*3/uL 243 254   NEUTROS PCT AUTO %  --  75.5   LYMPHS PCT AUTO %  --  12.2   MONOS PCT AUTO %  --  8.9   EOS PCT AUTO %  --  1.9     Results from last 72 hours   Lab Units 06/28/24  0736 06/27/24 2117   SODIUM mmol/L 136 133*   POTASSIUM mmol/L 3.7 3.2*   CHLORIDE mmol/L 101 98   CO2 mmol/L 26 27   BUN mg/dL 14 18   CREATININE mg/dL 0.71 0.85   GLUCOSE mg/dL 168* 120*   CALCIUM mg/dL 8.7 9.0   ANION GAP mmol/L 13 11   EGFR mL/min/1.73m*2 >90 80     Results from last 72 hours   Lab Units 06/28/24  0736 06/27/24 2117   ALK PHOS U/L 39 39   BILIRUBIN TOTAL mg/dL 0.6 0.8   PROTEIN TOTAL g/dL 6.5 6.5   ALT U/L 30 33   AST U/L 31 37   ALBUMIN g/dL 3.3* 3.1*     Estimated Creatinine Clearance: 100.9 mL/min (by C-G formula based on SCr of 0.71 mg/dL).    Microbiology  No results found for the last 90 days.      Imaging  The initial chest film was relatively unremarkable but there is an atelectatic infiltrate in the lingula that seems to be having more opening in the center than at the periphery, the reverse halo sign.  There is patchy infiltrates in " the upper and lower zones as well.    Assessment/Plan   Community-acquired pneumonia, associated with COVID    Assessment: Although there is not an unusual pattern to this pneumonia, it should be treated as any other community-acquired pneumonia I think, and ceftriaxone azithromycin is fine.  I would assess the response at 5 to 7 days, and that may require another CAT scan to see if there is improvement.  Generally if there is not a long those lines, then further invasive studies like bronchoscopy could be considered.    Plan: Resume ceftriaxone azithromycin, I will add urine antigen studies    I spent 60 minutes in the professional and overall care of this patient.      Chaka Hallman MD

## 2024-06-28 NOTE — H&P
History Of Present Illness  Kika Marcelino is a 58 y.o. female presenting with chief complaint of shortness of breath and malaise.  Patient endorses a positive COVID-19 diagnosis 1 week from the day of admission.  States that she began to experience symptoms including cough, malaise, chest congestion with coworkers having tested positive as well as their direct family members.  She states that she took 2 home COVID-19 tests, both of which were noted to be positive.  She does endorse mild dyspnea but otherwise states she believes her course has been mild.  Patient's niece who is present at bedside provided corroborating information and patient does state that it was at the urging of her family that she sought evaluation today.  Initial evaluation in the emergency department noted that patient has been requiring 4 L of oxygen to maintain appropriate SpO2 levels.  Remainder of laboratory workup was grossly unremarkable.  CT of the chest was obtained which showed a large reverse halo sign airspace disease involving the lingula with differential including pulmonary infarct, fungal pneumonia, or atypical bacterial infections.  In addition, diffuse centrilobular groundglass nodules and opacities within the upper lobe and patchy involvement in the lower lobes was noted, highly atypical for COVID-19.  Concerning for atypical etiology, such as mycobacterial.  In addition, incidental finding noted of a solid enhancing right renal mass measuring 3.3 x 3.1 cm only partially visualized.  Patient admitted for further management.     Past Medical History  History reviewed. No pertinent past medical history.    Surgical History  History reviewed. No pertinent surgical history.     Social History  She reports that she has been smoking cigarettes. She has never used smokeless tobacco. She reports current alcohol use. She reports current drug use. Drug: Marijuana.    Family History  No family history on file.     Allergies  Patient has  "no known allergies.    Review of Systems   Respiratory:  Positive for cough and shortness of breath.         Physical Exam  Vitals reviewed.   Constitutional:       Appearance: Normal appearance.   HENT:      Head: Normocephalic and atraumatic.      Nose: Nose normal.      Mouth/Throat:      Mouth: Mucous membranes are moist.   Eyes:      Extraocular Movements: Extraocular movements intact.      Conjunctiva/sclera: Conjunctivae normal.      Pupils: Pupils are equal, round, and reactive to light.   Cardiovascular:      Rate and Rhythm: Normal rate and regular rhythm.      Pulses: Normal pulses.      Heart sounds: Normal heart sounds.   Pulmonary:      Effort: Pulmonary effort is normal.      Breath sounds: Normal breath sounds.   Abdominal:      General: Bowel sounds are normal.      Palpations: Abdomen is soft.   Musculoskeletal:         General: Normal range of motion.      Cervical back: Normal range of motion and neck supple.   Skin:     General: Skin is warm and dry.   Neurological:      General: No focal deficit present.      Mental Status: She is alert. Mental status is at baseline.   Psychiatric:         Mood and Affect: Mood normal.         Behavior: Behavior normal.          Last Recorded Vitals  Blood pressure 155/83, pulse 96, temperature 35.7 °C (96.3 °F), temperature source Temporal, resp. rate 20, height 1.651 m (5' 5\"), weight 97.5 kg (215 lb), SpO2 94%.    Relevant Results  Scheduled medications  enoxaparin, 40 mg, subcutaneous, q24h  ipratropium-albuteroL, 3 mL, nebulization, q4h  pantoprazole, 40 mg, oral, Daily before breakfast   Or  pantoprazole, 40 mg, intravenous, Daily before breakfast      Continuous medications     PRN medications  PRN medications: acetaminophen **OR** acetaminophen **OR** acetaminophen, acetaminophen **OR** acetaminophen **OR** acetaminophen, codeine-guaifenesin, dextromethorphan-guaifenesin, melatonin, metoclopramide **OR** metoclopramide, ondansetron ODT **OR** " ondansetron, oxygen, polyethylene glycol  CT angio chest for pulmonary embolism    Result Date: 6/28/2024  Interpreted By:  Wicho Dailey, STUDY: CT ANGIO CHEST FOR PULMONARY EMBOLISM;  6/27/2024 11:28 pm   INDICATION: Signs/Symptoms:Possible PE.   COMPARISON: None.   ACCESSION NUMBER(S): [id number]   ORDERING CLINICIAN: MARGARITO RIVERA   TECHNIQUE: Contiguous axial images of the chest were obtained after the intravenous administration of iodinated contrast using angiographic PE protocol. Coronal and sagittal reformatted images were reconstructed from the axial data. MIP images were created on an independent workstation and reviewed.   FINDINGS: MEDIASTINUM AND LYMPH NODES:  The esophageal wall appears within normal limits. There are mildly enlarged left hilar lymph nodes are likely reactive. There are also several prominent right paratracheal, subcarinal and right hilar lymph nodes that are likely reactive. No pneumomediastinum.   VESSELS:  Normal caliber thoracic aorta without dissection. Mild aortic atherosclerosis. Nondiagnostic evaluation for pulmonary embolism within the lingula secondary to extensive cardiac and respiratory motion artifact. There is also nondiagnostic evaluation for pulmonary embolism at and distal to the segmental level in the right middle lobe to the same. Otherwise, no acute pulmonary embolism through the segmental level elsewhere.   HEART: Normal in size.  No coronary artery calcifications. No significant pericardial effusion.   LUNG, AIRWAYS, AND PLEURA: There are bilateral diffuse centrilobular ground-glass nodules within the bilateral upper lobes and patchy areas of the centrilobular ground-glass nodules and opacities within the right and left lower lobe (left > right). Within the lingula, there is a large dense consolidation with a central area of ground-glass opacity (reversed halo sign) with overall size measuring 7.8 cm x 3.4 cm.   OSSEOUS STRUCTURES: No acute osseous  abnormality.   CHEST WALL SOFT TISSUES: No discernible abnormality.   UPPER ABDOMEN/OTHER: No acute abnormality. Diffuse hypoattenuation of the liver consistent with steatosis. Cholelithiasis. No discernible gallbladder wall thickening, pericholecystic fluid, or stranding. Heterogeneous 3.3 cm x 3.1 cm right. Simple right upper pole renal cyst measuring 1.2 cm also noted.       1. Large reverse-halo sign airspace disease (consolidative opacity with central ground-glass opacity) in the lingula. Differential diagnosis consists of pulmonary infarct, fungal pneumonia (most commonly mucormycosis), other pneumonia including COVID-19 and bacterial pneumonia amongst others. Note that the there is nondiagnostic evaluation for pulmonary embolism within the lingula secondary to extensive cardiac and respiratory motion artifact. There is also nondiagnostic evaluation for pulmonary embolism at and distal to the segmental level in the right middle lobe to the same. Otherwise, no acute pulmonary embolism through the segmental level elsewhere.   2. Diffuse centrilobular ground-glass nodules and opacities within the upper lobes and patchy involvement in the lower lobes (left > right) that is highly atypical for COVID-19 and is most suggestive in pattern and distribution for bacterial pneumonia or atypical organisms such as mycobacterial. Integration of the clinical context, physical exam/laboratory findings, and imaging is recommended.   3. Suspicion for solid enhancing right renal mass measuring 3.3 cm x 3.1 cm; however, this is only partially visualized and suboptimally evaluated. Dedicated contrast enhanced MRI of the kidneys is recommended for further evaluation. YELLOW ALERT: An incidental finding alert was sent per protocol.   4. Cholelithiasis without evidence of acute cholecystitis.   MACRO: Critical Finding:  New mass in the kidney. Notification was initiated on 6/28/2024 at 12:35 am by  Wicho Dailey.  (**-YCF-**)  Instructions:  MR Abdomen w and wo IV contrast. 1 week.   Signed by: Wicho Dailey 6/28/2024 12:36 AM Dictation workstation:   [id number]    XR chest 1 view    Result Date: 6/27/2024  Interpreted By:  Kylah Haque, STUDY: XR CHEST 1 VIEW 6/27/2024 9:57 pm   INDICATION: Signs/Symptoms:Chest Pain   COMPARISON: None   ACCESSION NUMBER(S): [id number]   ORDERING CLINICIAN: EMERY PETER   TECHNIQUE: AP view   FINDINGS: There is a left lower lung infiltrate. There are low lung volumes. Pulmonary vascularity and cardiac silhouette appear normal. No pleural effusions.       Left lower lobe infiltrate. Follow-up to complete resolution recommended.   Signed by: Kylah Haque 6/27/2024 10:15 PM Dictation workstation:   [id number]   Results for orders placed or performed during the hospital encounter of 06/27/24 (from the past 24 hour(s))   CBC with Differential   Result Value Ref Range    WBC 10.6 4.4 - 11.3 x10*3/uL    nRBC 0.0 0.0 - 0.0 /100 WBCs    RBC 5.05 4.00 - 5.20 x10*6/uL    Hemoglobin 14.4 12.0 - 16.0 g/dL    Hematocrit 42.5 36.0 - 46.0 %    MCV 84 80 - 100 fL    MCH 28.5 26.0 - 34.0 pg    MCHC 33.9 32.0 - 36.0 g/dL    RDW 13.9 11.5 - 14.5 %    Platelets 254 150 - 450 x10*3/uL    Neutrophils % 75.5 40.0 - 80.0 %    Immature Granulocytes %, Automated 1.0 (H) 0.0 - 0.9 %    Lymphocytes % 12.2 13.0 - 44.0 %    Monocytes % 8.9 2.0 - 10.0 %    Eosinophils % 1.9 0.0 - 6.0 %    Basophils % 0.5 0.0 - 2.0 %    Neutrophils Absolute 8.02 (H) 1.20 - 7.70 x10*3/uL    Immature Granulocytes Absolute, Automated 0.11 0.00 - 0.70 x10*3/uL    Lymphocytes Absolute 1.30 1.20 - 4.80 x10*3/uL    Monocytes Absolute 0.94 0.10 - 1.00 x10*3/uL    Eosinophils Absolute 0.20 0.00 - 0.70 x10*3/uL    Basophils Absolute 0.05 0.00 - 0.10 x10*3/uL   Comprehensive Metabolic Panel   Result Value Ref Range    Glucose 120 (H) 74 - 99 mg/dL    Sodium 133 (L) 136 - 145 mmol/L    Potassium 3.2 (L) 3.5 - 5.3 mmol/L    Chloride 98 98 - 107 mmol/L     Bicarbonate 27 21 - 32 mmol/L    Anion Gap 11 10 - 20 mmol/L    Urea Nitrogen 18 6 - 23 mg/dL    Creatinine 0.85 0.50 - 1.05 mg/dL    eGFR 80 >60 mL/min/1.73m*2    Calcium 9.0 8.6 - 10.3 mg/dL    Albumin 3.1 (L) 3.4 - 5.0 g/dL    Alkaline Phosphatase 39 33 - 110 U/L    Total Protein 6.5 6.4 - 8.2 g/dL    AST 37 9 - 39 U/L    Bilirubin, Total 0.8 0.0 - 1.2 mg/dL    ALT 33 7 - 45 U/L   Brain Natriuretic Peptide   Result Value Ref Range    BNP 13 0 - 99 pg/mL   Protime-INR   Result Value Ref Range    Protime 13.5 (H) 9.8 - 12.8 seconds    INR 1.2 (H) 0.9 - 1.1   Troponin I, High Sensitivity, Initial   Result Value Ref Range    Troponin I, High Sensitivity 4 0 - 13 ng/L   Magnesium   Result Value Ref Range    Magnesium 1.98 1.60 - 2.40 mg/dL   D-dimer, quantitative   Result Value Ref Range    D-Dimer Non VTE, Quant (ng/mL FEU) 873 (H) <=500 ng/mL FEU   Troponin, High Sensitivity, 1 Hour   Result Value Ref Range    Troponin I, High Sensitivity 4 0 - 13 ng/L       Assessment/Plan   Principal Problem:    Shortness of breath  Active Problems:    Primary atypical pneumonia    Acute hypoxemic respiratory failure (Multi)    Renal mass    Dyspnea      Patient presents with complaints of fatigue, malaise, and dyspnea.  COVID-19  positive 7 days prior to admission now requiring 4 L of oxygen by nasal cannula.  Chest CT concerning for atypical pneumonia, cannot exclude fungal component, as well as right renal mass noted incidentally on chest CT.    -Patient is without prior MRSA isolate without inherent pseudomonal risk.  There is been no hospitalization or IV antibiotic usage within the last 90 days.  Patient does not meet major or minor criteria for severe infection.  Patient was therefore started on CAP coverage in the emergency department with combination Rocephin and azithromycin.    -Continue Rocephin and azithromycin at this time.  However, will place consultations for both ID service as well as pulmonary service.  Further  antibiotic recommendations as well as empiric treatment for fungal infection pending their assessment.  Literature available for review notes increasing incidence of fungal infections associated with COVID-19 specifically mucormycosis.    -With concern for possible atypical process, patient will require further diagnostic evaluation.  Will defer to pulmonary service regarding recommendations as this could be accomplished in several ways.  Through serial sputum testing versus bronchoscopy with lavage.  This was discussed briefly with patient regarding possible diagnostic methods however it was explained that no particular test or procedure is imminent.  This care plan will be dependent upon evaluation by pulmonary service.    -Keep NPO pending pulmonary evaluation    -Supportive measures at this time including DuoNebs every 4 hours, Robitussin AC/DM for cough or congestion.  Zofran.  Has been made available for nausea.    -CT of the chest also identified a solid enhancing right renal mass measuring 3.3 x 3.1 cm.  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  Will require alternative imaging study.  CT of the kidneys with and without IV contrast will be obtained.    -Maintain droplet plus precautions in the setting of positive COVID-19 test within 10 days    I spent >75 minutes in the professional and overall care of this patient.      Issac Manzano, DO

## 2024-06-28 NOTE — NURSING NOTE
Patient continues on 3l of o2 currently denies any sob. Patient still needs to be seen by pulmonology. Patient to be seen by urology tomorrow, per Dr. Membreno.

## 2024-06-29 LAB
ANION GAP SERPL CALC-SCNC: 9 MMOL/L (ref 10–20)
BUN SERPL-MCNC: 17 MG/DL (ref 6–23)
CALCIUM SERPL-MCNC: 8.9 MG/DL (ref 8.6–10.3)
CHLORIDE SERPL-SCNC: 103 MMOL/L (ref 98–107)
CO2 SERPL-SCNC: 30 MMOL/L (ref 21–32)
CREAT SERPL-MCNC: 0.76 MG/DL (ref 0.5–1.05)
EGFRCR SERPLBLD CKD-EPI 2021: >90 ML/MIN/1.73M*2
ERYTHROCYTE [DISTWIDTH] IN BLOOD BY AUTOMATED COUNT: 14.3 % (ref 11.5–14.5)
GLUCOSE SERPL-MCNC: 144 MG/DL (ref 74–99)
HCT VFR BLD AUTO: 36 % (ref 36–46)
HGB BLD-MCNC: 13.2 G/DL (ref 12–16)
LEGIONELLA AG UR QL: NEGATIVE
MCH RBC QN AUTO: 32.4 PG (ref 26–34)
MCHC RBC AUTO-ENTMCNC: 36.7 G/DL (ref 32–36)
MCV RBC AUTO: 89 FL (ref 80–100)
NRBC BLD-RTO: 0 /100 WBCS (ref 0–0)
PLATELET # BLD AUTO: 281 X10*3/UL (ref 150–450)
POTASSIUM SERPL-SCNC: 4.2 MMOL/L (ref 3.5–5.3)
RBC # BLD AUTO: 4.07 X10*6/UL (ref 4–5.2)
S PNEUM AG UR QL: NEGATIVE
SODIUM SERPL-SCNC: 138 MMOL/L (ref 136–145)
WBC # BLD AUTO: 9.8 X10*3/UL (ref 4.4–11.3)

## 2024-06-29 PROCEDURE — 2500000005 HC RX 250 GENERAL PHARMACY W/O HCPCS

## 2024-06-29 PROCEDURE — 99222 1ST HOSP IP/OBS MODERATE 55: CPT | Performed by: UROLOGY

## 2024-06-29 PROCEDURE — C9113 INJ PANTOPRAZOLE SODIUM, VIA: HCPCS | Performed by: INTERNAL MEDICINE

## 2024-06-29 PROCEDURE — 94640 AIRWAY INHALATION TREATMENT: CPT

## 2024-06-29 PROCEDURE — 2500000004 HC RX 250 GENERAL PHARMACY W/ HCPCS (ALT 636 FOR OP/ED): Performed by: INTERNAL MEDICINE

## 2024-06-29 PROCEDURE — 36415 COLL VENOUS BLD VENIPUNCTURE: CPT | Performed by: INTERNAL MEDICINE

## 2024-06-29 PROCEDURE — 99233 SBSQ HOSP IP/OBS HIGH 50: CPT | Performed by: INTERNAL MEDICINE

## 2024-06-29 PROCEDURE — 2500000002 HC RX 250 W HCPCS SELF ADMINISTERED DRUGS (ALT 637 FOR MEDICARE OP, ALT 636 FOR OP/ED): Performed by: INTERNAL MEDICINE

## 2024-06-29 PROCEDURE — 80048 BASIC METABOLIC PNL TOTAL CA: CPT | Performed by: INTERNAL MEDICINE

## 2024-06-29 PROCEDURE — 85027 COMPLETE CBC AUTOMATED: CPT | Performed by: INTERNAL MEDICINE

## 2024-06-29 PROCEDURE — 1200000002 HC GENERAL ROOM WITH TELEMETRY DAILY

## 2024-06-29 ASSESSMENT — COGNITIVE AND FUNCTIONAL STATUS - GENERAL
MOBILITY SCORE: 23
CLIMB 3 TO 5 STEPS WITH RAILING: A LITTLE
DAILY ACTIVITIY SCORE: 24
DAILY ACTIVITIY SCORE: 24
CLIMB 3 TO 5 STEPS WITH RAILING: A LITTLE
MOBILITY SCORE: 23

## 2024-06-29 ASSESSMENT — PAIN SCALES - GENERAL
PAINLEVEL_OUTOF10: 0 - NO PAIN
PAINLEVEL_OUTOF10: 0 - NO PAIN

## 2024-06-29 ASSESSMENT — PAIN - FUNCTIONAL ASSESSMENT
PAIN_FUNCTIONAL_ASSESSMENT: 0-10
PAIN_FUNCTIONAL_ASSESSMENT: 0-10

## 2024-06-29 NOTE — PROGRESS NOTES
"Kika Marcelino is a 58 y.o. female on day 1 of admission presenting with Shortness of breath.    Subjective   Patient seen and examined, no acute events overnight.  Discussed CT scan finding showing the right renal lesion, concerning for neoplasm.  Urology is consulted.  Patient's breathing feels about the same compared to yesterday, she still complains of shortness of breath with any exertion and a cough.  She feels like she is not able to cough anything up.  Discussed trying Mucinex however she says she cannot swallow any pills.  Her hypoxia has improved, she is weaned down to 2 L nasal cannula from 4 L.  Discussed with pulmonology regarding plan of steroids and antibiotics and reevaluate in the next couple days.  She denies chest pain, abdominal pain, nausea, vomiting or diarrhea.  Discussed plan of care in detail with patient and provided CT scan results printed for the patient.       Objective     Physical Exam  Vitals reviewed.   Constitutional:       Appearance: Normal appearance.   HENT:      Head: Normocephalic and atraumatic.      Nose: Nose normal.   Cardiovascular:      Rate and Rhythm: Normal rate and regular rhythm.      Pulses: Normal pulses.      Heart sounds: Normal heart sounds.   Pulmonary:      Effort: Pulmonary effort is normal.      Breath sounds: Wheezing and rhonchi present. No rales.      Comments: On 2 L NC  Abdominal:      General: Abdomen is flat. Bowel sounds are normal.      Palpations: Abdomen is soft.      Tenderness: There is no abdominal tenderness.   Skin:     General: Skin is warm and dry.   Neurological:      Mental Status: She is alert and oriented to person, place, and time. Mental status is at baseline.   Psychiatric:         Mood and Affect: Mood normal.         Last Recorded Vitals  Blood pressure (!) 168/94, pulse 63, temperature 36 °C (96.8 °F), temperature source Temporal, resp. rate 16, height 1.651 m (5' 5\"), weight 101 kg (223 lb 12.3 oz), SpO2 99%.  Intake/Output last " 3 Shifts:  I/O last 3 completed shifts:  In: 2569 (25.3 mL/kg) [I.V.:970 (9.6 mL/kg); IV Piggyback:1599]  Out: - (0 mL/kg)   Weight: 101.5 kg     Relevant Results  Scheduled medications  azithromycin, 500 mg, intravenous, q24h  cefTRIAXone, 1 g, intravenous, q24h  enoxaparin, 40 mg, subcutaneous, q24h  ipratropium-albuteroL, 3 mL, nebulization, q4h  methylPREDNISolone sodium succinate (PF), 40 mg, intravenous, q12h  pantoprazole, 40 mg, oral, Daily before breakfast   Or  pantoprazole, 40 mg, intravenous, Daily before breakfast  potassium chloride CR, 40 mEq, oral, Once      Continuous medications     PRN medications  PRN medications: acetaminophen **OR** acetaminophen **OR** acetaminophen, acetaminophen **OR** acetaminophen **OR** acetaminophen, codeine-guaifenesin, dextromethorphan-guaifenesin, melatonin, metoclopramide **OR** metoclopramide, ondansetron ODT **OR** ondansetron, oxygen, polyethylene glycol  Results from last 7 days   Lab Units 06/29/24  0545 06/28/24  0736 06/27/24  2117   WBC AUTO x10*3/uL 9.8 7.8 10.6   RBC AUTO x10*6/uL 4.07 4.34 5.05   HEMOGLOBIN g/dL 13.2 13.7 14.4     Results from last 7 days   Lab Units 06/29/24  0545 06/28/24  0736 06/27/24  2117   SODIUM mmol/L 138 136 133*   POTASSIUM mmol/L 4.2 3.7 3.2*   CHLORIDE mmol/L 103 101 98   CO2 mmol/L 30 26 27   BUN mg/dL 17 14 18   CREATININE mg/dL 0.76 0.71 0.85   CALCIUM mg/dL 8.9 8.7 9.0   MAGNESIUM mg/dL  --   --  1.98   BILIRUBIN TOTAL mg/dL  --  0.6 0.8   ALT U/L  --  30 33   AST U/L  --  31 37       Assessment/Plan   Principal Problem:    Shortness of breath  Active Problems:    Primary atypical pneumonia    Acute hypoxemic respiratory failure (Multi)    Renal mass    Dyspnea  Acute hypoxic respiratory failure secondary to post COVID pneumonia, community acquired pneumonia, possibly atypical pneumonia  Right renal lesion concerning for possible neoplasm  Suspect white coat HTN, patient states no previous hx HTN, she is nervous everytime  during vital time, will monitor    Plan:  -wean O2 as tolerated  -Urology following for right renal mass  -Pulm consulted- solumed 40 BID and rocephin/azithro  -ID following  -mucinex encouraged but patient states she is unable to swallow pills  -AM labs  -duonebs  -droplet precautions  -cultures pending  -supportive care  -dvt ppx lovenox  -no home meds  -no need for vitals 9pm-7am unless a change in patient status to allow her to rest  -will follow patient closely           I spent 35 minutes in the professional and overall care of this patient.      Ana Mcmahon, DO

## 2024-06-29 NOTE — CONSULTS
Reason For Consult  Incidental renal mass    History Of Present Illness  Kika Marcelino is a 58 y.o. female With a history of recent COVID-19, admitted with atypical pneumonia who was found to have an incidental renal mass.  On admission, patient was noted to be hypoxic and has been on 4 L of nasal cannula.  During admission this is reduced to 2 L.  As part of her evaluation she had a CT scan of the abdomen and pelvis which revealed a 3.3 cm renal mass.  Patient reports at baseline, no significant medical issues or functional status limitations.  No history of gross hematuria or flank pain.    Following CT scan has been personally viewed and interpreted, results were discussed with the patient  CT A/P with and without contrast, kidney protocol: She has a right, mostly exophytic interpolar mass abutting the liver anteriorly.  Enhancing.  Remainder of the kidney appears radiographically normal with a small cyst in the right posterior kidney roughly 1 cm.  The left kidney there appears to be a simple renal cyst.  Of note, this is new since April 2022.  There is no evidence of disease elsewhere in the abdomen or chest.  There is some motion artifact.  Does not clearly invade renal sinus or collecting system.    Creatinine 0.76  Calcium 8.9    PSHx: No prior abdominal surgery     Past Medical History  She has no past medical history on file.    Surgical History  She has no past surgical history on file.     Social History  She reports that she has been smoking cigarettes. She has never used smokeless tobacco. She reports current alcohol use. She reports current drug use. Drug: Marijuana.    Family History  No family history on file.     Allergies  Patient has no known allergies.    Review of Systems  A 12 system review was completed and is negative with the exception of those signs and symptoms noted in the history of present illness.     Physical Exam  General: in NAD, appears stated age  Head: normocephalic,  "atraumatic  Respiratory: Appears short of breath on room air  Cardiovascular: no edema noted  Skin: normal turgor, no rashes  Neurologic: grossly intact, oriented to person/place/time  Psychiatric: mode and affect appropriate     Last Recorded Vitals  Blood pressure (!) 164/92, pulse 63, temperature 36.1 °C (97 °F), temperature source Temporal, resp. rate 18, height 1.651 m (5' 5\"), weight 101 kg (223 lb 12.3 oz), SpO2 97%.    Relevant Results      See above     Assessment/Plan   58-year-old female admitted with pneumonia, COVID who has an incidental small renal mass    # Small renal mass  -Patient has been adequately staged between CMP, CT chest, CT A/P  -70-80% chance this represents a renal malignancy  -We discussed management options, ultimately agreed on robotic partial nephrectomy which will be scheduled as an outpatient  -In the interval, we will get an MRI kidney in a few weeks after her pneumonia settles down  -Outpatient follow-up has been requested    Please call with any further questions      Gerard Nixon MD    "

## 2024-06-29 NOTE — NURSING NOTE
0300 patient received rocephin and Zithromax IV antibiotics, no adverse effect. Tolerated well.    Patient resting in bed. Patient is up ind. In room Covid precaution maintained Safety maintained, call light within reach.

## 2024-06-30 VITALS
DIASTOLIC BLOOD PRESSURE: 103 MMHG | HEART RATE: 71 BPM | BODY MASS INDEX: 36.77 KG/M2 | WEIGHT: 220.68 LBS | TEMPERATURE: 97.9 F | HEIGHT: 65 IN | SYSTOLIC BLOOD PRESSURE: 180 MMHG | RESPIRATION RATE: 16 BRPM | OXYGEN SATURATION: 97 %

## 2024-06-30 LAB
ANION GAP SERPL CALC-SCNC: 12 MMOL/L (ref 10–20)
BACTERIA BLD CULT: NORMAL
BACTERIA BLD CULT: NORMAL
BUN SERPL-MCNC: 16 MG/DL (ref 6–23)
CALCIUM SERPL-MCNC: 9.3 MG/DL (ref 8.6–10.3)
CHLORIDE SERPL-SCNC: 102 MMOL/L (ref 98–107)
CO2 SERPL-SCNC: 27 MMOL/L (ref 21–32)
CREAT SERPL-MCNC: 0.71 MG/DL (ref 0.5–1.05)
EGFRCR SERPLBLD CKD-EPI 2021: >90 ML/MIN/1.73M*2
ERYTHROCYTE [DISTWIDTH] IN BLOOD BY AUTOMATED COUNT: 14.1 % (ref 11.5–14.5)
GLUCOSE SERPL-MCNC: 113 MG/DL (ref 74–99)
HCT VFR BLD AUTO: 41.6 % (ref 36–46)
HGB BLD-MCNC: 13.4 G/DL (ref 12–16)
MCH RBC QN AUTO: 28 PG (ref 26–34)
MCHC RBC AUTO-ENTMCNC: 32.2 G/DL (ref 32–36)
MCV RBC AUTO: 87 FL (ref 80–100)
NRBC BLD-RTO: 0 /100 WBCS (ref 0–0)
PLATELET # BLD AUTO: 273 X10*3/UL (ref 150–450)
POTASSIUM SERPL-SCNC: 4.5 MMOL/L (ref 3.5–5.3)
RBC # BLD AUTO: 4.78 X10*6/UL (ref 4–5.2)
SODIUM SERPL-SCNC: 136 MMOL/L (ref 136–145)
WBC # BLD AUTO: 13.3 X10*3/UL (ref 4.4–11.3)

## 2024-06-30 PROCEDURE — 2500000004 HC RX 250 GENERAL PHARMACY W/ HCPCS (ALT 636 FOR OP/ED): Performed by: INTERNAL MEDICINE

## 2024-06-30 PROCEDURE — 2500000001 HC RX 250 WO HCPCS SELF ADMINISTERED DRUGS (ALT 637 FOR MEDICARE OP): Performed by: INTERNAL MEDICINE

## 2024-06-30 PROCEDURE — 36415 COLL VENOUS BLD VENIPUNCTURE: CPT | Performed by: INTERNAL MEDICINE

## 2024-06-30 PROCEDURE — 85027 COMPLETE CBC AUTOMATED: CPT | Performed by: INTERNAL MEDICINE

## 2024-06-30 PROCEDURE — C9113 INJ PANTOPRAZOLE SODIUM, VIA: HCPCS | Performed by: INTERNAL MEDICINE

## 2024-06-30 PROCEDURE — 99233 SBSQ HOSP IP/OBS HIGH 50: CPT | Performed by: INTERNAL MEDICINE

## 2024-06-30 PROCEDURE — 94640 AIRWAY INHALATION TREATMENT: CPT

## 2024-06-30 PROCEDURE — 1200000002 HC GENERAL ROOM WITH TELEMETRY DAILY

## 2024-06-30 PROCEDURE — 2500000005 HC RX 250 GENERAL PHARMACY W/O HCPCS

## 2024-06-30 PROCEDURE — 82374 ASSAY BLOOD CARBON DIOXIDE: CPT | Performed by: INTERNAL MEDICINE

## 2024-06-30 PROCEDURE — 2500000002 HC RX 250 W HCPCS SELF ADMINISTERED DRUGS (ALT 637 FOR MEDICARE OP, ALT 636 FOR OP/ED): Performed by: INTERNAL MEDICINE

## 2024-06-30 RX ORDER — IPRATROPIUM BROMIDE AND ALBUTEROL SULFATE 2.5; .5 MG/3ML; MG/3ML
3 SOLUTION RESPIRATORY (INHALATION) EVERY 6 HOURS
Status: ACTIVE | OUTPATIENT
Start: 2024-06-30

## 2024-06-30 ASSESSMENT — COGNITIVE AND FUNCTIONAL STATUS - GENERAL
CLIMB 3 TO 5 STEPS WITH RAILING: A LITTLE
MOBILITY SCORE: 23
DAILY ACTIVITIY SCORE: 24

## 2024-06-30 ASSESSMENT — PAIN SCALES - GENERAL
PAINLEVEL_OUTOF10: 0 - NO PAIN
PAINLEVEL_OUTOF10: 0 - NO PAIN

## 2024-06-30 NOTE — PROGRESS NOTES
INPATIENT PULMONARY  PROGRESS NOTES    PATIENT NAME: Kika Marcelino    MRN: [id number]  SERVICE DATE:  6/30/2024   SERVICE TIME:  1:34 PM        ASSESSMENT :     Acute hypoxic respiratory failure  Multifocal lobar pneumonia  Left lower lobe large consolidation with halo sign with multiple differential diagnosis as per radiology report  Tobacco use disorder with signs of bilateral wheezing, suggestive of coexisting obstructive airway disease  However no definite evidence of cavity or necrotizing pneumonia yet  COVID-pneumonia  Patient is not immune compromised    Patient does not feel subjective improvement overall and was having more wheezing episodes  Solu-Medrol was added yesterday, still having cough with poor expectoration and still short of breath with mild exertion  Continue with current bronchodilators, patient feels a significant subjective improvement after bronchodilators  Continue with steroids  Continue with antibiotics  Will repeat chest x-ray in a.m. and decide the need for any further workup or intervention  Add Acapella to encourage expectoration        SUBJECTIVE  Afebrile  Patient denied any inhalation of tobacco or marijuana and recently  And she said she only vapes occasionally  No events overnight   Still with cough poor expectoration, intermittent wheezing  No chest pain  Still having short of breath with any exertion and requiring oxygen all the time with desaturation  Patient does not feel subjective improvement overall and was having more wheezing episodes  Solu-Medrol was added yesterday, still having cough with poor expectoration and still short of breath with mild exertion  REVIEW OF SYSTEMS    GENERAL: no pain fever, fatigue , chills night sweats or weight loss  Head and neck: no pain or swelling  Chest : as per HPI  Card ; no h/o CAD, CHF , A FIB. KNOWN WITH HTN. NO chest pain, orthopnea or paroxysmal noct dyspnea  Abdomen no abd pain, swelling or change in bowel habits, regular, no  constipation or diarrhea  Neuro , no cahne in awakeness, or confusion, no weakness numbness headache or change in vision  Extremities; no leg swelling, pain or change in color   Vascular, NO H/O pvd  Endocrine: h/o DM, HYPOTHYROID           Scheduled medications  azithromycin, 500 mg, intravenous, q24h  cefTRIAXone, 1 g, intravenous, q24h  enoxaparin, 40 mg, subcutaneous, q24h  ipratropium-albuteroL, 3 mL, nebulization, q4h  methylPREDNISolone sodium succinate (PF), 40 mg, intravenous, q12h  pantoprazole, 40 mg, oral, Daily before breakfast   Or  pantoprazole, 40 mg, intravenous, Daily before breakfast  potassium chloride CR, 40 mEq, oral, Once      Continuous medications     PRN medications  PRN medications: acetaminophen **OR** acetaminophen **OR** acetaminophen, acetaminophen **OR** acetaminophen **OR** acetaminophen, codeine-guaifenesin, dextromethorphan-guaifenesin, melatonin, metoclopramide **OR** metoclopramide, ondansetron ODT **OR** ondansetron, oxygen, polyethylene glycol         OBJECTIVE    Physical Exam  Vitals reviewed.   Constitutional:       Appearance: Normal appearance. She is obese.  On oxygen 3 L/min  HENT:      Head: Normocephalic and atraumatic.   Eyes:      Extraocular Movements: Extraocular movements intact.      Pupils: Pupils are equal, round, and reactive to light.   Cardiovascular:      Rate and Rhythm: Normal rate and regular rhythm.      Heart sounds: No murmur heard.  Pulmonary:      Effort: No respiratory distress present.      Breath sounds: No stridor.  Creased breath sounds more on the left base but no wheezing and rhonchi present.   Chest:      Chest wall: No tenderness.   Abdominal:      General: Bowel sounds are normal. There is no distension.      Palpations: There is no mass.      Tenderness: There is no abdominal tenderness. There is no rebound.   Musculoskeletal:         General: No swelling present. No tenderness or deformity.      Cervical back: No rigidity.  "  Lymphadenopathy:      Cervical: No cervical adenopathy.   Skin:     General: Skin is warm.      Coloration: Skin is pale. Skin is not jaundiced.      Findings: No bruising.   Neurological:      General: No focal deficit present.      Mental Status: She is alert and oriented to person, place, and time. Mental status is at baseline.      Cranial Nerves: No cranial nerve deficit.      Motor: Weakness present.   Psychiatric:         Mood and Affect: Mood normal.         Behavior: Behavior normal.     Patient Vitals for the past 24 hrs:   BP Temp Temp src Pulse Resp SpO2 Weight   06/30/24 1200 143/80 36.2 °C (97.2 °F) Temporal 65 18 97 % --   06/30/24 0800 140/90 36 °C (96.8 °F) Temporal 73 18 95 % --   06/30/24 0600 -- -- -- -- -- -- 100 kg (220 lb 10.9 oz)   06/29/24 2116 150/88 -- -- -- -- -- --   06/29/24 2000 -- 36.5 °C (97.7 °F) Temporal 68 16 98 % --   06/29/24 1614 -- -- -- -- -- 99 % --   06/29/24 1600 140/80 36 °C (96.8 °F) Temporal 60 16 98 % --             Gen: NAD  Neck: symmetric, no mass  Cardiovascular: RRR  Respiratory: No distress   GI: Abd soft, nontender, non-distended  Extremities: no leg edema  Skin: Warm and dry.  Neuro: alert and oriented times 3    Labs:  Lab Results   Component Value Date    WBC 13.3 (H) 06/30/2024    HGB 13.4 06/30/2024    HCT 41.6 06/30/2024    MCV 87 06/30/2024     06/30/2024     Lab Results   Component Value Date    GLUCOSE 113 (H) 06/30/2024    CALCIUM 9.3 06/30/2024     06/30/2024    K 4.5 06/30/2024    CO2 27 06/30/2024     06/30/2024    BUN 16 06/30/2024    CREATININE 0.71 06/30/2024   ESR: --No results found for: \"SEDRATE\"No results found for: \"CRP\"  Lab Results   Component Value Date    ALT 30 06/28/2024    AST 31 06/28/2024    ALKPHOS 39 06/28/2024    BILITOT 0.6 06/28/2024           DATA:   Diagnostic tests reviewed for today's visit:    Labs this admission reviewed  Imagings this admission reviewed  Cultures: Reviewed    CT kidney w and wo IV " contrast   Final Result   Right renal lesion incompletely included in the field of view on CT   chest with contrast 27 June 2024 is confirmed an enhancing neoplasm   on today's CT kidney        It is new from CT abdomen and pelvis 20 April 2022        It does not contain fat or calcification        It is confined within the perirenal fat, without any suggestion of   invasion of the main vein        It is not particularly necrotic in appearance but does have   heterogeneous enhancement        No sign of active metastatic disease in the abdomen        The included lower chest is abnormal but unchanged from yesterday's   chest CT        MACRO:   None        Signed by: Hayder Palomino 6/28/2024 8:27 AM   Dictation workstation:   [id number]      CT angio chest for pulmonary embolism   Final Result   1. Large reverse-halo sign airspace disease (consolidative opacity   with central ground-glass opacity) in the lingula. Differential   diagnosis consists of pulmonary infarct, fungal pneumonia (most   commonly mucormycosis), other pneumonia including COVID-19 and   bacterial pneumonia amongst others. Note that the there is   nondiagnostic evaluation for pulmonary embolism within the lingula   secondary to extensive cardiac and respiratory motion artifact. There   is also nondiagnostic evaluation for pulmonary embolism at and distal   to the segmental level in the right middle lobe to the same.   Otherwise, no acute pulmonary embolism through the segmental level   elsewhere.        2. Diffuse centrilobular ground-glass nodules and opacities within   the upper lobes and patchy involvement in the lower lobes (left >   right) that is highly atypical for COVID-19 and is most suggestive in   pattern and distribution for bacterial pneumonia or atypical   organisms such as mycobacterial. Integration of the clinical context,   physical exam/laboratory findings, and imaging is recommended.        3. Suspicion for solid enhancing right  renal mass measuring 3.3 cm x   3.1 cm; however, this is only partially visualized and suboptimally   evaluated. Dedicated contrast enhanced MRI of the kidneys is   recommended for further evaluation. YELLOW ALERT: An incidental   finding alert was sent per protocol.        4. Cholelithiasis without evidence of acute cholecystitis.        MACRO:   Critical Finding:  New mass in the kidney. Notification was initiated   on 6/28/2024 at 12:35 am by  Wicho Dailey.  (**-YCF-**)   Instructions:  MR Abdomen w and wo IV contrast. 1 week.        Signed by: Wicho Dailey 6/28/2024 12:36 AM   Dictation workstation:   [id number]      XR chest 1 view   Final Result   Left lower lobe infiltrate. Follow-up to complete resolution   recommended.        Signed by: Kylah Haque 6/27/2024 10:15 PM   Dictation workstation:   [id number]           Will Ng MD

## 2024-06-30 NOTE — NURSING NOTE
Patient with no major issues throughout shift. Patient on 2L O2. Patient up independent in the room. Patient can not take pills and states she either needs liquid or IV version. Patient uses call light appropriately.

## 2024-06-30 NOTE — PROGRESS NOTES
"INPATIENT PROGRESS NOTES    PATIENT NAME: Kika Mracelino    MRN: [id number]  SERVICE DATE:  6/30/2024   SERVICE TIME:  6:37 PM    SIGNATURE: Ever Wesley MD      SUBJECTIVE  Afebrile  No events overnight       OBJECTIVE  PHYSICAL EXAM:   Patient Vitals for the past 24 hrs:   BP Temp Temp src Pulse Resp SpO2 Weight   06/30/24 1726 -- -- -- -- -- 97 % --   06/30/24 1600 (!) 147/94 36.7 °C (98.1 °F) Temporal 62 19 99 % --   06/30/24 1200 143/80 36.2 °C (97.2 °F) Temporal 65 18 97 % --   06/30/24 0800 140/90 36 °C (96.8 °F) Temporal 73 18 95 % --   06/30/24 0600 -- -- -- -- -- -- 100 kg (220 lb 10.9 oz)   06/29/24 2116 150/88 -- -- -- -- -- --   06/29/24 2000 -- 36.5 °C (97.7 °F) Temporal 68 16 98 % --         Gen: NAD  Neck: symmetric, no mass  Cardiovascular: RRR  Respiratory: On 3 L of oxygen  Extremities: no leg edema  Skin: Warm and dry.    Labs:  Lab Results   Component Value Date    WBC 13.3 (H) 06/30/2024    HGB 13.4 06/30/2024    HCT 41.6 06/30/2024    MCV 87 06/30/2024     06/30/2024     Lab Results   Component Value Date    GLUCOSE 113 (H) 06/30/2024    CALCIUM 9.3 06/30/2024     06/30/2024    K 4.5 06/30/2024    CO2 27 06/30/2024     06/30/2024    BUN 16 06/30/2024    CREATININE 0.71 06/30/2024   ESR: --No results found for: \"SEDRATE\"No results found for: \"CRP\"  Lab Results   Component Value Date    ALT 30 06/28/2024    AST 31 06/28/2024    ALKPHOS 39 06/28/2024    BILITOT 0.6 06/28/2024         DATA:   Diagnostic tests reviewed for today's visit:    Labs this admission reviewed  Imagings this admission reviewed  Cultures: Reviewed        ASSESSMENT :   -Community-acquired pneumonia  -Recent COVID-19 infection  -Acute hypoxic respiratory failure  -History of COPD  -Right renal mass    PLAN:  -Negative Legionella and pneumococcal urine antigens  -Discontinue azithromycin  -Continue ceftriaxone  -Can be discharged on Augmentin to complete 7 days total of antibiotics treatment    Ever " "Lupillo GARZON.   Infectious Diseases Attending            This note was partially created using voice recognition software and is inherently subject to errors including those of syntax and \"sound-alike\" substitutions which may escape proofreading. In such instances, original meaning may be extrapolated by contextual derivation       "

## 2024-06-30 NOTE — NURSING NOTE
EOS: no acute changes this shift. Pt received antibiotics as ordered. Pt did not c/o pain. Call light remains within reach, safety maintained.

## 2024-06-30 NOTE — PROGRESS NOTES
"Kika Marcelino is a 58 y.o. female on day 2 of admission presenting with Shortness of breath.    Subjective   Patient seen and examined, no acute events overnight.  She is on 3 L nasal cannula, supposedly she desatted to the the upper 80s at some point.  She was on 2 L yesterday.  Family is at bedside.  She still complains of a cough however it is nonproductive.  I encouraged her to take Mucinex.  She still feels short of breath at times.  Her appetite is good.  She denies chest pain, abdominal pain, nausea, vomiting or diarrhea.       Objective     Physical Exam  Vitals reviewed.   Constitutional:       Appearance: Normal appearance.   HENT:      Head: Normocephalic and atraumatic.      Nose: Nose normal.   Cardiovascular:      Rate and Rhythm: Normal rate and regular rhythm.      Pulses: Normal pulses.      Heart sounds: Normal heart sounds.   Pulmonary:      Effort: Pulmonary effort is normal.      Breath sounds: Normal breath sounds. No rales.      Comments: On 3 L NC  Rhonchi bilaterally in bases  Abdominal:      General: Abdomen is flat. Bowel sounds are normal.      Palpations: Abdomen is soft.      Tenderness: There is no abdominal tenderness.   Skin:     General: Skin is warm and dry.   Neurological:      Mental Status: She is alert and oriented to person, place, and time. Mental status is at baseline.   Psychiatric:         Mood and Affect: Mood normal.         Last Recorded Vitals  Blood pressure 143/80, pulse 65, temperature 36.2 °C (97.2 °F), temperature source Temporal, resp. rate 18, height 1.651 m (5' 5\"), weight 100 kg (220 lb 10.9 oz), SpO2 97%.  Intake/Output last 3 Shifts:  I/O last 3 completed shifts:  In: 1270 (12.7 mL/kg) [I.V.:970 (9.7 mL/kg); IV Piggyback:300]  Out: - (0 mL/kg)   Weight: 100.1 kg     Relevant Results  Scheduled medications  azithromycin, 500 mg, intravenous, q24h  cefTRIAXone, 1 g, intravenous, q24h  enoxaparin, 40 mg, subcutaneous, q24h  ipratropium-albuteroL, 3 mL, " nebulization, q4h  methylPREDNISolone sodium succinate (PF), 40 mg, intravenous, q12h  pantoprazole, 40 mg, oral, Daily before breakfast   Or  pantoprazole, 40 mg, intravenous, Daily before breakfast  potassium chloride CR, 40 mEq, oral, Once      Continuous medications     PRN medications  PRN medications: acetaminophen **OR** acetaminophen **OR** acetaminophen, acetaminophen **OR** acetaminophen **OR** acetaminophen, codeine-guaifenesin, dextromethorphan-guaifenesin, melatonin, metoclopramide **OR** metoclopramide, ondansetron ODT **OR** ondansetron, oxygen, polyethylene glycol  Results from last 7 days   Lab Units 06/30/24  0603 06/29/24  0545 06/28/24  0736   WBC AUTO x10*3/uL 13.3* 9.8 7.8   RBC AUTO x10*6/uL 4.78 4.07 4.34   HEMOGLOBIN g/dL 13.4 13.2 13.7     Results from last 7 days   Lab Units 06/30/24  0603 06/29/24  0545 06/28/24  0736 06/27/24  2117   SODIUM mmol/L 136 138 136 133*   POTASSIUM mmol/L 4.5 4.2 3.7 3.2*   CHLORIDE mmol/L 102 103 101 98   CO2 mmol/L 27 30 26 27   BUN mg/dL 16 17 14 18   CREATININE mg/dL 0.71 0.76 0.71 0.85   CALCIUM mg/dL 9.3 8.9 8.7 9.0   MAGNESIUM mg/dL  --   --   --  1.98   BILIRUBIN TOTAL mg/dL  --   --  0.6 0.8   ALT U/L  --   --  30 33   AST U/L  --   --  31 37       Assessment/Plan   Principal Problem:    Shortness of breath  Active Problems:    Primary atypical pneumonia    Acute hypoxemic respiratory failure (Multi)    Renal mass    Dyspnea  Acute hypoxic respiratory failure secondary to post COVID pneumonia, community acquired pneumonia, possibly atypical pneumonia + reactive airway flare  Right renal lesion concerning for possible neoplasm  Suspect white coat HTN, patient states no previous hx HTN, she is nervous everytime during vital time, will monitor     Plan:  -wean O2 as tolerated- on 3 L NC from 2L NC yesterday  -Urology following for right renal mass  -Pulm consulted- solumed 40 BID and rocephin/azithro  -ID following  -mucinex encouraged but patient states  she is unable to swallow pills  -labs stable no need for repeat labs in AM  -duonebs  -droplet precautions  -cultures pending  -supportive care  -dvt ppx lovenox  -no home meds  -no need for vitals 9pm-7am unless a change in patient status to allow her to rest  -will follow patient closely       I spent 35 minutes in the professional and overall care of this patient.      Ana Mcmahon, DO

## 2024-07-01 ENCOUNTER — APPOINTMENT (OUTPATIENT)
Dept: RADIOLOGY | Facility: HOSPITAL | Age: 59
End: 2024-07-01
Payer: COMMERCIAL

## 2024-07-01 LAB
ANION GAP SERPL CALC-SCNC: 10 MMOL/L (ref 10–20)
BUN SERPL-MCNC: 16 MG/DL (ref 6–23)
CALCIUM SERPL-MCNC: 8.8 MG/DL (ref 8.6–10.3)
CHLORIDE SERPL-SCNC: 101 MMOL/L (ref 98–107)
CO2 SERPL-SCNC: 29 MMOL/L (ref 21–32)
CREAT SERPL-MCNC: 0.67 MG/DL (ref 0.5–1.05)
EGFRCR SERPLBLD CKD-EPI 2021: >90 ML/MIN/1.73M*2
ERYTHROCYTE [DISTWIDTH] IN BLOOD BY AUTOMATED COUNT: 13.9 % (ref 11.5–14.5)
GLUCOSE SERPL-MCNC: 110 MG/DL (ref 74–99)
HCT VFR BLD AUTO: 39.7 % (ref 36–46)
HGB BLD-MCNC: 13.1 G/DL (ref 12–16)
MCH RBC QN AUTO: 28.5 PG (ref 26–34)
MCHC RBC AUTO-ENTMCNC: 33 G/DL (ref 32–36)
MCV RBC AUTO: 87 FL (ref 80–100)
NRBC BLD-RTO: 0 /100 WBCS (ref 0–0)
PLATELET # BLD AUTO: 325 X10*3/UL (ref 150–450)
POTASSIUM SERPL-SCNC: 3.9 MMOL/L (ref 3.5–5.3)
RBC # BLD AUTO: 4.59 X10*6/UL (ref 4–5.2)
SODIUM SERPL-SCNC: 136 MMOL/L (ref 136–145)
WBC # BLD AUTO: 13.5 X10*3/UL (ref 4.4–11.3)

## 2024-07-01 PROCEDURE — 94640 AIRWAY INHALATION TREATMENT: CPT

## 2024-07-01 PROCEDURE — 2500000004 HC RX 250 GENERAL PHARMACY W/ HCPCS (ALT 636 FOR OP/ED): Performed by: INTERNAL MEDICINE

## 2024-07-01 PROCEDURE — 71046 X-RAY EXAM CHEST 2 VIEWS: CPT

## 2024-07-01 PROCEDURE — 85027 COMPLETE CBC AUTOMATED: CPT | Performed by: INTERNAL MEDICINE

## 2024-07-01 PROCEDURE — 71046 X-RAY EXAM CHEST 2 VIEWS: CPT | Performed by: RADIOLOGY

## 2024-07-01 PROCEDURE — 99233 SBSQ HOSP IP/OBS HIGH 50: CPT | Performed by: INTERNAL MEDICINE

## 2024-07-01 PROCEDURE — 1200000002 HC GENERAL ROOM WITH TELEMETRY DAILY

## 2024-07-01 PROCEDURE — C9113 INJ PANTOPRAZOLE SODIUM, VIA: HCPCS | Performed by: INTERNAL MEDICINE

## 2024-07-01 PROCEDURE — 2500000001 HC RX 250 WO HCPCS SELF ADMINISTERED DRUGS (ALT 637 FOR MEDICARE OP): Performed by: INTERNAL MEDICINE

## 2024-07-01 PROCEDURE — 36415 COLL VENOUS BLD VENIPUNCTURE: CPT | Performed by: INTERNAL MEDICINE

## 2024-07-01 PROCEDURE — 2500000002 HC RX 250 W HCPCS SELF ADMINISTERED DRUGS (ALT 637 FOR MEDICARE OP, ALT 636 FOR OP/ED): Performed by: INTERNAL MEDICINE

## 2024-07-01 PROCEDURE — 80048 BASIC METABOLIC PNL TOTAL CA: CPT | Performed by: INTERNAL MEDICINE

## 2024-07-01 RX ORDER — IPRATROPIUM BROMIDE AND ALBUTEROL SULFATE 2.5; .5 MG/3ML; MG/3ML
3 SOLUTION RESPIRATORY (INHALATION) EVERY 2 HOUR PRN
Status: DISCONTINUED | OUTPATIENT
Start: 2024-07-01 | End: 2024-07-03 | Stop reason: HOSPADM

## 2024-07-01 ASSESSMENT — PAIN SCALES - GENERAL
PAINLEVEL_OUTOF10: 0 - NO PAIN
PAINLEVEL_OUTOF10: 0 - NO PAIN

## 2024-07-01 ASSESSMENT — COGNITIVE AND FUNCTIONAL STATUS - GENERAL
MOBILITY SCORE: 23
DAILY ACTIVITIY SCORE: 24
CLIMB 3 TO 5 STEPS WITH RAILING: A LITTLE

## 2024-07-01 NOTE — PROGRESS NOTES
"Kika Marcelino is a 58 y.o. female on day 3 of admission presenting with Shortness of breath.    Subjective   Patient seen and examined, no acute events overnight.  She was able to be weaned off oxygen.  She took a shower this morning and is feeling better.  She still feels short of breath with exertion and is wheezing.  She is having difficulty coughing up much sputum.  Her vitals are stable, labs are overall unremarkable.  Repeat chest x-ray showed increased density of the lingula consolidation compared to 4 days ago, will reach out to pulmonology for evaluation.       Objective     Physical Exam  Vitals reviewed.   Constitutional:       Appearance: Normal appearance.   HENT:      Head: Normocephalic and atraumatic.      Nose: Nose normal.   Cardiovascular:      Rate and Rhythm: Normal rate and regular rhythm.      Pulses: Normal pulses.      Heart sounds: Normal heart sounds.   Pulmonary:      Effort: Pulmonary effort is normal.      Breath sounds: Normal breath sounds. No rales.      Comments: Minimal wheezing in the bases  Abdominal:      General: Abdomen is flat. Bowel sounds are normal.      Palpations: Abdomen is soft.      Tenderness: There is no abdominal tenderness.   Skin:     General: Skin is warm and dry.   Neurological:      Mental Status: She is alert and oriented to person, place, and time. Mental status is at baseline.   Psychiatric:         Mood and Affect: Mood normal.         Last Recorded Vitals  Blood pressure 164/88, pulse 70, temperature 36.6 °C (97.9 °F), temperature source Temporal, resp. rate 16, height 1.651 m (5' 5\"), weight 101 kg (222 lb 9.6 oz), SpO2 95%.  Intake/Output last 3 Shifts:  I/O last 3 completed shifts:  In: 100 (1 mL/kg) [IV Piggyback:100]  Out: - (0 mL/kg)   Weight: 101 kg     Relevant Results  Scheduled medications  cefTRIAXone, 1 g, intravenous, q24h  enoxaparin, 40 mg, subcutaneous, q24h  ipratropium-albuteroL, 3 mL, nebulization, q6h  methylPREDNISolone sodium " succinate (PF), 40 mg, intravenous, q12h  pantoprazole, 40 mg, oral, Daily before breakfast   Or  pantoprazole, 40 mg, intravenous, Daily before breakfast  potassium chloride CR, 40 mEq, oral, Once      Continuous medications     PRN medications  PRN medications: acetaminophen **OR** acetaminophen **OR** acetaminophen, acetaminophen **OR** acetaminophen **OR** acetaminophen, codeine-guaifenesin, dextromethorphan-guaifenesin, ipratropium-albuteroL, melatonin, metoclopramide **OR** metoclopramide, ondansetron ODT **OR** ondansetron, oxygen, polyethylene glycol  Results from last 7 days   Lab Units 07/01/24  0555 06/30/24  0603 06/29/24  0545   WBC AUTO x10*3/uL 13.5* 13.3* 9.8   RBC AUTO x10*6/uL 4.59 4.78 4.07   HEMOGLOBIN g/dL 13.1 13.4 13.2     Results from last 7 days   Lab Units 07/01/24  0555 06/30/24  0603 06/29/24  0545 06/28/24  0736 06/27/24  2117   SODIUM mmol/L 136 136 138 136 133*   POTASSIUM mmol/L 3.9 4.5 4.2 3.7 3.2*   CHLORIDE mmol/L 101 102 103 101 98   CO2 mmol/L 29 27 30 26 27   BUN mg/dL 16 16 17 14 18   CREATININE mg/dL 0.67 0.71 0.76 0.71 0.85   CALCIUM mg/dL 8.8 9.3 8.9 8.7 9.0   MAGNESIUM mg/dL  --   --   --   --  1.98   BILIRUBIN TOTAL mg/dL  --   --   --  0.6 0.8   ALT U/L  --   --   --  30 33   AST U/L  --   --   --  31 37       Assessment/Plan   Principal Problem:    Shortness of breath  Active Problems:    Primary atypical pneumonia    Acute hypoxemic respiratory failure (Multi)    Renal mass    Dyspnea  Acute hypoxic respiratory failure secondary to post COVID pneumonia, community acquired pneumonia, possibly atypical pneumonia + reactive airway flare  Right renal lesion concerning for possible neoplasm  Suspect white coat HTN, patient states no previous hx HTN, she is nervous everytime during vital time, will monitor     Plan:  -repeat cxray today showed increased density of lingula consolidation compared to 4 days ago  -wean O2 as tolerated- patient weaned off O2 today  -Urology  following for right renal mass  -Pulm consulted- solumed 40 BID and rocephin/azithro  -ID following- plan for augmentin at DC  -mucinex encouraged but patient states she is unable to swallow pills  -labs stable no need for repeat labs in AM  -duonebs  -droplet precautions  -cultures pending  -supportive care  -dvt ppx lovenox  -no home meds  -no need for vitals 9pm-7am unless a change in patient status to allow her to rest  -will follow patient closely, anticipate DC home 1-2 days pending continued improvement                  I spent 35 minutes in the professional and overall care of this patient.      Ana Mcmahon, DO

## 2024-07-01 NOTE — NURSING NOTE
EOS:  Slept on and off through the night.  Remains on O2 at 2lpm with SaO2 97%, moist non-productive cough remains, not much wheezing present throughout the night.  Ambulating in room and to the bathroom independently.  Medicated once with Robitussin with Codeine with good results.

## 2024-07-01 NOTE — PROGRESS NOTES
"INPATIENT PROGRESS NOTES    PATIENT NAME: Kika Marcelino    MRN: [id number]  SERVICE DATE:  7/1/2024   SERVICE TIME:  11:08 AM    SIGNATURE: Ever Wesley MD      SUBJECTIVE  Afebrile  No events overnight       OBJECTIVE  PHYSICAL EXAM:   Patient Vitals for the past 24 hrs:   BP Temp Temp src Pulse Resp SpO2 Weight   07/01/24 0801 -- -- -- -- -- 97 % --   07/01/24 0800 (!) 164/100 -- -- 69 20 97 % --   07/01/24 0600 -- -- -- -- -- -- 101 kg (222 lb 9.6 oz)   06/30/24 2015 138/90 -- -- -- -- -- --   06/30/24 2000 (!) 180/103 36.6 °C (97.9 °F) Temporal 71 16 97 % --   06/30/24 1726 -- -- -- -- -- 97 % --   06/30/24 1600 (!) 147/94 36.7 °C (98.1 °F) Temporal 62 19 99 % --   06/30/24 1200 143/80 36.2 °C (97.2 °F) Temporal 65 18 97 % --         Gen: NAD  Respiratory: Off oxygen    Labs:  Lab Results   Component Value Date    WBC 13.5 (H) 07/01/2024    HGB 13.1 07/01/2024    HCT 39.7 07/01/2024    MCV 87 07/01/2024     07/01/2024     Lab Results   Component Value Date    GLUCOSE 110 (H) 07/01/2024    CALCIUM 8.8 07/01/2024     07/01/2024    K 3.9 07/01/2024    CO2 29 07/01/2024     07/01/2024    BUN 16 07/01/2024    CREATININE 0.67 07/01/2024   ESR: --No results found for: \"SEDRATE\"No results found for: \"CRP\"  Lab Results   Component Value Date    ALT 30 06/28/2024    AST 31 06/28/2024    ALKPHOS 39 06/28/2024    BILITOT 0.6 06/28/2024         DATA:   Diagnostic tests reviewed for today's visit:    Labs this admission reviewed  Imagings this admission reviewed  Cultures: Reviewed        ASSESSMENT :   -Community-acquired pneumonia  -Recent COVID-19 infection  -Acute hypoxic respiratory failure  -History of COPD  -Right renal mass    PLAN:  -Clinically improving  -Okay to discharge on Augmentin to complete 7 days    Ever Wesley MD.   Infectious Diseases Attending            This note was partially created using voice recognition software and is inherently subject to errors including those of syntax " "and \"sound-alike\" substitutions which may escape proofreading. In such instances, original meaning may be extrapolated by contextual derivation       "

## 2024-07-01 NOTE — NURSING NOTE
Pt calm and cooperative through shift. Pt did not complain of pain. Pt weened to RA and ambulated in room. Pt had chest xray this shift.

## 2024-07-02 LAB
ALBUMIN SERPL BCP-MCNC: 3.4 G/DL (ref 3.4–5)
ALP SERPL-CCNC: 37 U/L (ref 33–110)
ALT SERPL W P-5'-P-CCNC: 27 U/L (ref 7–45)
ANION GAP SERPL CALC-SCNC: 10 MMOL/L (ref 10–20)
AST SERPL W P-5'-P-CCNC: 21 U/L (ref 9–39)
BACTERIA BLD CULT: NORMAL
BACTERIA BLD CULT: NORMAL
BILIRUB SERPL-MCNC: 0.6 MG/DL (ref 0–1.2)
BUN SERPL-MCNC: 19 MG/DL (ref 6–23)
CALCIUM SERPL-MCNC: 9.2 MG/DL (ref 8.6–10.3)
CHLORIDE SERPL-SCNC: 101 MMOL/L (ref 98–107)
CO2 SERPL-SCNC: 29 MMOL/L (ref 21–32)
CREAT SERPL-MCNC: 0.81 MG/DL (ref 0.5–1.05)
EGFRCR SERPLBLD CKD-EPI 2021: 84 ML/MIN/1.73M*2
GLUCOSE SERPL-MCNC: 170 MG/DL (ref 74–99)
POTASSIUM SERPL-SCNC: 4.2 MMOL/L (ref 3.5–5.3)
PROT SERPL-MCNC: 6.2 G/DL (ref 6.4–8.2)
SODIUM SERPL-SCNC: 136 MMOL/L (ref 136–145)

## 2024-07-02 PROCEDURE — 2500000004 HC RX 250 GENERAL PHARMACY W/ HCPCS (ALT 636 FOR OP/ED): Performed by: INTERNAL MEDICINE

## 2024-07-02 PROCEDURE — 99233 SBSQ HOSP IP/OBS HIGH 50: CPT | Performed by: INTERNAL MEDICINE

## 2024-07-02 PROCEDURE — 2500000002 HC RX 250 W HCPCS SELF ADMINISTERED DRUGS (ALT 637 FOR MEDICARE OP, ALT 636 FOR OP/ED): Performed by: INTERNAL MEDICINE

## 2024-07-02 PROCEDURE — 80053 COMPREHEN METABOLIC PANEL: CPT | Performed by: INTERNAL MEDICINE

## 2024-07-02 PROCEDURE — 36415 COLL VENOUS BLD VENIPUNCTURE: CPT | Performed by: INTERNAL MEDICINE

## 2024-07-02 PROCEDURE — 1200000002 HC GENERAL ROOM WITH TELEMETRY DAILY

## 2024-07-02 PROCEDURE — C9113 INJ PANTOPRAZOLE SODIUM, VIA: HCPCS | Performed by: INTERNAL MEDICINE

## 2024-07-02 PROCEDURE — 2500000001 HC RX 250 WO HCPCS SELF ADMINISTERED DRUGS (ALT 637 FOR MEDICARE OP): Performed by: INTERNAL MEDICINE

## 2024-07-02 PROCEDURE — 94640 AIRWAY INHALATION TREATMENT: CPT

## 2024-07-02 SDOH — ECONOMIC STABILITY: FOOD INSECURITY: WITHIN THE PAST 12 MONTHS, YOU WORRIED THAT YOUR FOOD WOULD RUN OUT BEFORE YOU GOT THE MONEY TO BUY MORE.: NEVER TRUE

## 2024-07-02 SDOH — ECONOMIC STABILITY: GENERAL

## 2024-07-02 SDOH — ECONOMIC STABILITY: FOOD INSECURITY: WITHIN THE PAST 12 MONTHS, THE FOOD YOU BOUGHT JUST DIDN'T LAST AND YOU DIDN'T HAVE MONEY TO GET MORE.: NEVER TRUE

## 2024-07-02 SDOH — ECONOMIC STABILITY: HOUSING INSECURITY

## 2024-07-02 SDOH — ECONOMIC STABILITY: TRANSPORTATION INSECURITY
IN THE PAST 12 MONTHS, HAS LACK OF TRANSPORTATION KEPT YOU FROM MEETINGS, WORK, OR FROM GETTING THINGS NEEDED FOR DAILY LIVING?: NO

## 2024-07-02 SDOH — ECONOMIC STABILITY: HOUSING INSECURITY: IN THE LAST 12 MONTHS, WAS THERE A TIME WHEN YOU WERE NOT ABLE TO PAY THE MORTGAGE OR RENT ON TIME?: NO

## 2024-07-02 SDOH — ECONOMIC STABILITY: FOOD INSECURITY: WITHIN THE PAST 12 MONTHS, YOU WORRIED THAT YOUR FOOD WOULD RUN OUT BEFORE YOU GOT MONEY TO BUY MORE.: NEVER TRUE

## 2024-07-02 SDOH — ECONOMIC STABILITY: TRANSPORTATION INSECURITY
IN THE PAST 12 MONTHS, HAS THE LACK OF TRANSPORTATION KEPT YOU FROM MEDICAL APPOINTMENTS OR FROM GETTING MEDICATIONS?: NO

## 2024-07-02 SDOH — ECONOMIC STABILITY: FOOD INSECURITY

## 2024-07-02 SDOH — ECONOMIC STABILITY: INCOME INSECURITY: IN THE LAST 12 MONTHS, WAS THERE A TIME WHEN YOU WERE NOT ABLE TO PAY THE MORTGAGE OR RENT ON TIME?: NO

## 2024-07-02 SDOH — ECONOMIC STABILITY: TRANSPORTATION INSECURITY

## 2024-07-02 SDOH — ECONOMIC STABILITY: FOOD INSECURITY: WITHIN THE PAST 12 MONTHS, THE FOOD YOU BOUGHT JUST DIDN’T LAST AND YOU DIDN’T HAVE MONEY TO GET MORE.: NEVER TRUE

## 2024-07-02 SDOH — ECONOMIC STABILITY: TRANSPORTATION INSECURITY: IN THE PAST 12 MONTHS, HAS LACK OF TRANSPORTATION KEPT YOU FROM MEDICAL APPOINTMENTS OR FROM GETTING MEDICATIONS?: NO

## 2024-07-02 SDOH — ECONOMIC STABILITY: HOUSING INSECURITY: IN THE PAST 12 MONTHS HAS THE ELECTRIC, GAS, OIL, OR WATER COMPANY THREATENED TO SHUT OFF SERVICES IN YOUR HOME?: NO

## 2024-07-02 SDOH — ECONOMIC STABILITY: HOUSING INSECURITY
IN THE LAST 12 MONTHS, WAS THERE A TIME WHEN YOU DID NOT HAVE A STEADY PLACE TO SLEEP OR SLEPT IN A SHELTER (INCLUDING NOW)?: NO

## 2024-07-02 ASSESSMENT — COGNITIVE AND FUNCTIONAL STATUS - GENERAL
DAILY ACTIVITIY SCORE: 24
MOBILITY SCORE: 24

## 2024-07-02 ASSESSMENT — ACTIVITIES OF DAILY LIVING (ADL): LACK_OF_TRANSPORTATION: NO

## 2024-07-02 ASSESSMENT — PAIN SCALES - GENERAL
PAINLEVEL_OUTOF10: 0 - NO PAIN
PAINLEVEL_OUTOF10: 0 - NO PAIN

## 2024-07-02 ASSESSMENT — SOCIAL DETERMINANTS OF HEALTH (SDOH): IN THE PAST 12 MONTHS, HAS THE ELECTRIC, GAS, OIL, OR WATER COMPANY THREATENED TO SHUT OFF SERVICE IN YOUR HOME?: NO

## 2024-07-02 NOTE — PROGRESS NOTES
Department of Medicine  Division of Pulmonary, Critical Care, and Sleep Medicine    Kika Marcelino is a 58 y.o. smoker female with no known significant PMHx and is recently COVID-19 positive on day 4 of admission presenting with Shortness of breath and cough. She was admitted for Community-acquired Pneumonia.     Subjective   There was no acute overnight events. She continues to sat well on room air. The patient states she still feels short of breath when she gets around, and her dry cough is persisting. However, she did told me that the hot shower helped her a lot and she noticed her voice pitch is slowly returning back to baseline.      Objective     Vital Signs      7/1/2024     6:00 AM 7/1/2024     8:00 AM 7/1/2024    12:00 PM 7/1/2024     4:00 PM 7/1/2024     8:00 PM 7/2/2024     8:00 AM 7/2/2024    12:00 PM   Vitals   Systolic  164 164 158 128 148 149   Diastolic  100 88 88 86 86 91   Heart Rate  69 70 81 62 73 69   Temp    35.7 °C (96.3 °F) 36.5 °C (97.7 °F) 36.1 °C (97 °F) 36.8 °C (98.2 °F)   Resp  20 16 16 18 18 18   Weight (lb) 222.6         BMI 37.04 kg/m2         BSA (m2) 2.15 m2            Physical Exam  Vitals and nursing note reviewed.   Constitutional:       General: No acute distress.     Appearance: Normal appearance.   HENT:      Head: Normocephalic and atraumatic.      Mouth/Throat:      Mouth: Mucous membranes are moist.   Eyes:      Conjunctiva/sclera: Conjunctivae normal.   Cardiovascular:      Rate and Rhythm: Normal rate and regular rhythm. No murmurs.  Pulmonary:      Effort: Pulmonary effort is normal. No respiratory distress.      Breath sounds: Normal breath sounds. No stridor. No wheezing or rhonchi.   Musculoskeletal:         General: Normal range of motion.      Cervical back: Normal range of motion and neck supple.      Extremities: No bilateral calf tenderness or edema appreciated.  Skin:     General: Skin is warm and dry.      Coloration: Skin is not jaundiced.   Neurological:       General: No focal deficit present.      Mental Status: Alert and oriented to person, place, and time. Mental status is at baseline.   Psychiatric:         Mood and Affect: Mood normal.         Behavior: Behavior normal.         Judgment: Judgment normal.     Labs:  Lab Results   Component Value Date    WBC 13.5 (H) 07/01/2024    HGB 13.1 07/01/2024    HCT 39.7 07/01/2024    MCV 87 07/01/2024     07/01/2024      Lab Results   Component Value Date    GLUCOSE 110 (H) 07/01/2024    CALCIUM 8.8 07/01/2024     07/01/2024    K 3.9 07/01/2024    CO2 29 07/01/2024     07/01/2024    BUN 16 07/01/2024    CREATININE 0.67 07/01/2024      Lab Results   Component Value Date    ALT 30 06/28/2024    AST 31 06/28/2024    ALKPHOS 39 06/28/2024    BILITOT 0.6 06/28/2024        Oxygen Therapy  SpO2: 93 %  Medical Gas Therapy: None (Room air)  O2 Delivery Method: Nasal cannula       Intake/Output last 3 Shifts:  I/O last 3 completed shifts:  In: 820 (8.1 mL/kg) [P.O.:720; IV Piggyback:100]  Out: - (0 mL/kg)   Weight: 101 kg       Medications   Scheduled medications  cefTRIAXone, 1 g, intravenous, q24h  enoxaparin, 40 mg, subcutaneous, q24h  ipratropium-albuteroL, 3 mL, nebulization, q6h  methylPREDNISolone sodium succinate (PF), 40 mg, intravenous, q12h  pantoprazole, 40 mg, oral, Daily before breakfast   Or  pantoprazole, 40 mg, intravenous, Daily before breakfast  potassium chloride CR, 40 mEq, oral, Once      Continuous medications     PRN medications  PRN medications: acetaminophen **OR** acetaminophen **OR** acetaminophen, acetaminophen **OR** acetaminophen **OR** acetaminophen, codeine-guaifenesin, dextromethorphan-guaifenesin, ipratropium-albuteroL, melatonin, metoclopramide **OR** metoclopramide, ondansetron ODT **OR** ondansetron, oxygen, polyethylene glycol     Allergies  Patient has no known allergies.    Microbiology   6/28/24: Negative blood cultures 2x  6/28/24: Negative Strep pneumo and  Legionella    Chest Radiograph   CT angio chest for pulmonary embolism 06/27/2024    Narrative  Interpreted By:  Wicho Dailey,  STUDY:  CT ANGIO CHEST FOR PULMONARY EMBOLISM;  6/27/2024 11:28 pm    INDICATION:  Signs/Symptoms:Possible PE.    COMPARISON:  None.    ACCESSION NUMBER(S):  [id number]    ORDERING CLINICIAN:  MARGARITO RIVERA    TECHNIQUE:  Contiguous axial images of the chest were obtained after the  intravenous administration of iodinated contrast using angiographic  PE protocol. Coronal and sagittal reformatted images were  reconstructed from the axial data. MIP images were created on an  independent workstation and reviewed.    FINDINGS:  MEDIASTINUM AND LYMPH NODES:  The esophageal wall appears within  normal limits. There are mildly enlarged left hilar lymph nodes are  likely reactive. There are also several prominent right paratracheal,  subcarinal and right hilar lymph nodes that are likely reactive. No  pneumomediastinum.    VESSELS:  Normal caliber thoracic aorta without dissection. Mild  aortic atherosclerosis. Nondiagnostic evaluation for pulmonary  embolism within the lingula secondary to extensive cardiac and  respiratory motion artifact. There is also nondiagnostic evaluation  for pulmonary embolism at and distal to the segmental level in the  right middle lobe to the same. Otherwise, no acute pulmonary embolism  through the segmental level elsewhere.    HEART: Normal in size.  No coronary artery calcifications. No  significant pericardial effusion.    LUNG, AIRWAYS, AND PLEURA: There are bilateral diffuse centrilobular  ground-glass nodules within the bilateral upper lobes and patchy  areas of the centrilobular ground-glass nodules and opacities within  the right and left lower lobe (left > right). Within the lingula,  there is a large dense consolidation with a central area of  ground-glass opacity (reversed halo sign) with overall size measuring  7.8 cm x 3.4 cm.    OSSEOUS  STRUCTURES: No acute osseous abnormality.    CHEST WALL SOFT TISSUES: No discernible abnormality.    UPPER ABDOMEN/OTHER: No acute abnormality. Diffuse hypoattenuation of  the liver consistent with steatosis. Cholelithiasis. No discernible  gallbladder wall thickening, pericholecystic fluid, or stranding.  Heterogeneous 3.3 cm x 3.1 cm right. Simple right upper pole renal  cyst measuring 1.2 cm also noted.    Impression  1. Large reverse-halo sign airspace disease (consolidative opacity  with central ground-glass opacity) in the lingula. Differential  diagnosis consists of pulmonary infarct, fungal pneumonia (most  commonly mucormycosis), other pneumonia including COVID-19 and  bacterial pneumonia amongst others. Note that the there is  nondiagnostic evaluation for pulmonary embolism within the lingula  secondary to extensive cardiac and respiratory motion artifact. There  is also nondiagnostic evaluation for pulmonary embolism at and distal  to the segmental level in the right middle lobe to the same.  Otherwise, no acute pulmonary embolism through the segmental level  elsewhere.    2. Diffuse centrilobular ground-glass nodules and opacities within  the upper lobes and patchy involvement in the lower lobes (left >  right) that is highly atypical for COVID-19 and is most suggestive in  pattern and distribution for bacterial pneumonia or atypical  organisms such as mycobacterial. Integration of the clinical context,  physical exam/laboratory findings, and imaging is recommended.    3. Suspicion for solid enhancing right renal mass measuring 3.3 cm x  3.1 cm; however, this is only partially visualized and suboptimally  evaluated. Dedicated contrast enhanced MRI of the kidneys is  recommended for further evaluation. YELLOW ALERT: An incidental  finding alert was sent per protocol.    4. Cholelithiasis without evidence of acute cholecystitis.    MACRO:  Critical Finding:  New mass in the kidney. Notification was  initiated  on 6/28/2024 at 12:35 am by  Wicho Dailey.  (**-YCF-**)  Instructions:  MR Abdomen w and wo IV contrast. 1 week.    Signed by: Wicho Dailey 6/28/2024 12:36 AM  Dictation workstation:   [id number]       XR chest 2 views 07/01/2024    Narrative  Interpreted By:  Venkat Vallejo,  STUDY:  XR CHEST 2 VIEWS;  7/1/2024 8:33 am    INDICATION:  Signs/Symptoms:pnemonia.    COMPARISON:  06/27/2024    ACCESSION NUMBER(S):  [id number]    ORDERING CLINICIAN:  JOSHUA THOMPSON    FINDINGS:  Consolidation silhouetting the left heart border is more dense. Right  lung grossly clear. No pleural effusion. Unremarkable  cardiomediastinal silhouette. No pulmonary vascular congestion.    Impression  Increased density of lingula consolidation compared to 4 days ago.    MACRO:  None    Signed by: Venkat Vallejo 7/1/2024 8:43 AM  Dictation workstation:   [id number]      XR chest 1 view 06/27/2024    Narrative  Interpreted By:  Kylah Haque,  STUDY:  XR CHEST 1 VIEW 6/27/2024 9:57 pm    INDICATION:  Signs/Symptoms:Chest Pain    COMPARISON:  None    ACCESSION NUMBER(S):  [id number]    ORDERING CLINICIAN:  EMERY PETER    TECHNIQUE:  AP view    FINDINGS:  There is a left lower lung infiltrate. There are low lung volumes.  Pulmonary vascularity and cardiac silhouette appear normal. No  pleural effusions.    Impression  Left lower lobe infiltrate. Follow-up to complete resolution  recommended.    Signed by: Kylah Haque 6/27/2024 10:15 PM  Dictation workstation:   [id number]     Assessment and Plan / Recommendations   Assessment/Plan     This is a 57 y/o smoker female with no known significant PMHx and is recently COVID-19 positive, who was admitted for Community-acquired Pneumonia. Recent CXR 7/1/24 revealed increased density of lingula. She is currently on IV Ceftriaxone 1 g every day, IV Solumedrol 40 mg BID, and bronchodilators treatment.    Plan:   - Awaiting for pt's pulse ox while ambulating  - Repeat CXR/PA lateral  tomorrow  - Continue with current antibiotics regimen, per ID.    Pao Mims, DO  Internal Medicine Resident Physician, PGY-1     Date: 07/02/24 Time: 1:33 PM  Please excuse any misspellings or unintended errors related to the Dragon speech recognition software used to dictate this note.

## 2024-07-02 NOTE — PROGRESS NOTES
"Kika Marcelino is a 58 y.o. female on day 4 of admission presenting with Shortness of breath.    Subjective      Patient seen and examined, no acute events overnight.  She is on room air.  Her oxygenation has improved however states she still feels congested.  She states she is not able to take oral pills, ID is recommending Augmentin at discharge however she cannot swallow.  I confirmed with pharmacy that we are able to crush the Augmentin.    Objective     Physical Exam  Vitals reviewed.   Constitutional:       Appearance: Normal appearance.   HENT:      Head: Normocephalic and atraumatic.      Nose: Nose normal.   Cardiovascular:      Rate and Rhythm: Normal rate and regular rhythm.      Pulses: Normal pulses.      Heart sounds: Normal heart sounds.   Pulmonary:      Effort: Pulmonary effort is normal.      Breath sounds: Normal breath sounds. No rales.      Comments: + cough  Abdominal:      General: Abdomen is flat. Bowel sounds are normal.      Palpations: Abdomen is soft.      Tenderness: There is no abdominal tenderness.   Skin:     General: Skin is warm and dry.   Neurological:      Mental Status: She is alert and oriented to person, place, and time. Mental status is at baseline.   Psychiatric:         Mood and Affect: Mood normal.         Last Recorded Vitals  Blood pressure (!) 149/91, pulse 69, temperature 36.8 °C (98.2 °F), temperature source Temporal, resp. rate 18, height 1.651 m (5' 5\"), weight 101 kg (222 lb 9.6 oz), SpO2 93%.  Intake/Output last 3 Shifts:  I/O last 3 completed shifts:  In: 820 (8.1 mL/kg) [P.O.:720; IV Piggyback:100]  Out: - (0 mL/kg)   Weight: 101 kg     Relevant Results  Scheduled medications  cefTRIAXone, 1 g, intravenous, q24h  enoxaparin, 40 mg, subcutaneous, q24h  ipratropium-albuteroL, 3 mL, nebulization, q6h  methylPREDNISolone sodium succinate (PF), 40 mg, intravenous, q12h  pantoprazole, 40 mg, oral, Daily before breakfast   Or  pantoprazole, 40 mg, intravenous, Daily " before breakfast  potassium chloride CR, 40 mEq, oral, Once      Continuous medications     PRN medications  PRN medications: acetaminophen **OR** acetaminophen **OR** acetaminophen, acetaminophen **OR** acetaminophen **OR** acetaminophen, codeine-guaifenesin, dextromethorphan-guaifenesin, ipratropium-albuteroL, melatonin, metoclopramide **OR** metoclopramide, ondansetron ODT **OR** ondansetron, oxygen, polyethylene glycol  Results from last 7 days   Lab Units 07/01/24  0555 06/30/24  0603 06/29/24  0545   WBC AUTO x10*3/uL 13.5* 13.3* 9.8   RBC AUTO x10*6/uL 4.59 4.78 4.07   HEMOGLOBIN g/dL 13.1 13.4 13.2     Results from last 7 days   Lab Units 07/01/24  0555 06/30/24  0603 06/29/24  0545 06/28/24  0736 06/27/24  2117   SODIUM mmol/L 136 136 138 136 133*   POTASSIUM mmol/L 3.9 4.5 4.2 3.7 3.2*   CHLORIDE mmol/L 101 102 103 101 98   CO2 mmol/L 29 27 30 26 27   BUN mg/dL 16 16 17 14 18   CREATININE mg/dL 0.67 0.71 0.76 0.71 0.85   CALCIUM mg/dL 8.8 9.3 8.9 8.7 9.0   MAGNESIUM mg/dL  --   --   --   --  1.98   BILIRUBIN TOTAL mg/dL  --   --   --  0.6 0.8   ALT U/L  --   --   --  30 33   AST U/L  --   --   --  31 37       Assessment/Plan   Principal Problem:    Shortness of breath  Active Problems:    Primary atypical pneumonia    Acute hypoxemic respiratory failure (Multi)    Renal mass    Dyspnea  Acute hypoxic respiratory failure secondary to post COVID pneumonia, community acquired pneumonia, possibly atypical pneumonia + reactive airway flare- improving, patient weaned off O2 4 L NC now on room air  Right renal lesion concerning for possible neoplasm  Suspect white coat HTN, patient states no previous hx HTN, she is nervous everytime during vital time, will monitor     Plan:  -OK to crush augmentin at DC per pharmacy, patient is unable to swallow any pills  -repeat cxray yesterday showed increased density of lingula consolidation compared to 4 days ago  -atient weaned off O2 7/1/24 but still not feeling much  better  -Urology following for right renal mass  -Pulm consulted- solumed 40 BID and rocephin/azithro  -ID following- plan for augmentin at DC  -mucinex encouraged but patient states she is unable to swallow pills  -labs stable no need for repeat labs in AM  -duonebs  -droplet precautions  -cultures pending  -supportive care  -dvt ppx lovenox  -no home meds  -no need for vitals 9pm-7am unless a change in patient status to allow her to rest  -will follow patient closely, anticipate DC home 1-2 days pending continued improvement and clearance from pulm            I spent 35 minutes in the professional and overall care of this patient.      Ana Mcmahon, DO

## 2024-07-02 NOTE — NURSING NOTE
Pt calm and cooperative through shift. Pt did not complain of pain but did complain of coughing this shift. Pt ambulated this shift and had ambulatory pulse ox that she did not drop below 95%. Pt ate her meals and rested through shift.

## 2024-07-03 ENCOUNTER — APPOINTMENT (OUTPATIENT)
Dept: RADIOLOGY | Facility: HOSPITAL | Age: 59
End: 2024-07-03
Payer: COMMERCIAL

## 2024-07-03 VITALS
SYSTOLIC BLOOD PRESSURE: 150 MMHG | WEIGHT: 222.6 LBS | HEART RATE: 66 BPM | BODY MASS INDEX: 37.09 KG/M2 | TEMPERATURE: 97 F | HEIGHT: 65 IN | RESPIRATION RATE: 18 BRPM | OXYGEN SATURATION: 100 % | DIASTOLIC BLOOD PRESSURE: 81 MMHG

## 2024-07-03 LAB
ERYTHROCYTE [DISTWIDTH] IN BLOOD BY AUTOMATED COUNT: 13.9 % (ref 11.5–14.5)
HCT VFR BLD AUTO: 41.3 % (ref 36–46)
HGB BLD-MCNC: 13.7 G/DL (ref 12–16)
MCH RBC QN AUTO: 28.5 PG (ref 26–34)
MCHC RBC AUTO-ENTMCNC: 33.2 G/DL (ref 32–36)
MCV RBC AUTO: 86 FL (ref 80–100)
NRBC BLD-RTO: 0 /100 WBCS (ref 0–0)
PLATELET # BLD AUTO: 360 X10*3/UL (ref 150–450)
RBC # BLD AUTO: 4.8 X10*6/UL (ref 4–5.2)
WBC # BLD AUTO: 16 X10*3/UL (ref 4.4–11.3)

## 2024-07-03 PROCEDURE — 71046 X-RAY EXAM CHEST 2 VIEWS: CPT | Performed by: RADIOLOGY

## 2024-07-03 PROCEDURE — 85027 COMPLETE CBC AUTOMATED: CPT | Performed by: INTERNAL MEDICINE

## 2024-07-03 PROCEDURE — 2500000002 HC RX 250 W HCPCS SELF ADMINISTERED DRUGS (ALT 637 FOR MEDICARE OP, ALT 636 FOR OP/ED): Performed by: INTERNAL MEDICINE

## 2024-07-03 PROCEDURE — 2500000004 HC RX 250 GENERAL PHARMACY W/ HCPCS (ALT 636 FOR OP/ED): Performed by: INTERNAL MEDICINE

## 2024-07-03 PROCEDURE — 71046 X-RAY EXAM CHEST 2 VIEWS: CPT

## 2024-07-03 PROCEDURE — C9113 INJ PANTOPRAZOLE SODIUM, VIA: HCPCS | Performed by: INTERNAL MEDICINE

## 2024-07-03 PROCEDURE — RXMED WILLOW AMBULATORY MEDICATION CHARGE

## 2024-07-03 PROCEDURE — 36415 COLL VENOUS BLD VENIPUNCTURE: CPT | Performed by: INTERNAL MEDICINE

## 2024-07-03 PROCEDURE — 94640 AIRWAY INHALATION TREATMENT: CPT

## 2024-07-03 PROCEDURE — 99239 HOSP IP/OBS DSCHRG MGMT >30: CPT | Performed by: INTERNAL MEDICINE

## 2024-07-03 RX ORDER — ALBUTEROL SULFATE 90 UG/1
2 AEROSOL, METERED RESPIRATORY (INHALATION) EVERY 6 HOURS PRN
Qty: 8.5 G | Refills: 0 | Status: SHIPPED | OUTPATIENT
Start: 2024-07-03

## 2024-07-03 RX ORDER — AMOXICILLIN AND CLAVULANATE POTASSIUM 875; 125 MG/1; MG/1
875 TABLET, FILM COATED ORAL 2 TIMES DAILY
Qty: 14 TABLET | Refills: 0 | Status: SHIPPED | OUTPATIENT
Start: 2024-07-03 | End: 2024-07-12

## 2024-07-03 RX ORDER — METHYLPREDNISOLONE 4 MG/1
TABLET ORAL
Qty: 21 TABLET | Refills: 0 | Status: SHIPPED | OUTPATIENT
Start: 2024-07-03 | End: 2024-07-11

## 2024-07-03 ASSESSMENT — COGNITIVE AND FUNCTIONAL STATUS - GENERAL
MOBILITY SCORE: 24
DAILY ACTIVITIY SCORE: 24

## 2024-07-03 ASSESSMENT — PAIN SCALES - GENERAL: PAINLEVEL_OUTOF10: 0 - NO PAIN

## 2024-07-03 NOTE — DISCHARGE INSTRUCTIONS
Please see PCP in 1 week  Please see pulmonology dr torres in 1-2 weeks  Get repeat cxray or CT as outpatient with pulmonology to ensure resolution/improvement of pneumonia    Take augmentin twice a day for 7 days for your pneumonia  Take medrol dose pack/steroids as Rx  Albuterol inhaler is Rx as needed for wheezing or SOB    Return to the ER if any worsening signs/symptoms or issues    See urology dr lopes for follow up re: renal mass, concern for malignancy, you will need this removed as we discussed. Please call [facility] to schedule.    It was a pleasure taking care of you,  -Dr. Dori Mcmahon

## 2024-07-03 NOTE — PROGRESS NOTES
Department of Medicine  Division of Pulmonary, Critical Care, and Sleep Medicine    Kika Marcelino is a 58 y.o. smoker female with no known significant PMHx and is recently COVID-19 positive on day 5 of admission presenting with Shortness of breath and cough. She was admitted for Community-acquired Pneumonia.     Subjective   There were no acute overnight events. Pt underwent ambulatory pulse ox check, and she did not desats during and after ambulation. Pt says she slept well last night and felt much improved compared to yesterday. She does not feel too short of breath, is talking more, and the pressure in her chest is getting better. Cough is still persisting, however.      Objective     Vital Signs      7/2/2024     8:00 AM 7/2/2024    12:00 PM 7/2/2024     4:00 PM 7/2/2024     8:00 PM 7/3/2024     8:00 AM 7/3/2024     8:02 AM 7/3/2024    12:00 PM   Vitals   Systolic 148 149 179 132 170 186 150   Diastolic 86 91 104 86 94 102 81   Heart Rate 73 69 77 85 64  66   Temp 36.1 °C (97 °F) 36.8 °C (98.2 °F) 36.9 °C (98.4 °F) 36.2 °C (97.2 °F) 36 °C (96.8 °F)  36.1 °C (97 °F)   Resp 18 18 18 16 18  18      Physical Exam  Vitals and nursing note reviewed.   Constitutional:       General: No acute distress.     Appearance: Normal appearance.   HENT:      Head: Normocephalic and atraumatic.      Mouth/Throat:      Mouth: Mucous membranes are moist.   Eyes:      Conjunctiva/sclera: Conjunctivae normal.   Cardiovascular:      Rate and Rhythm: Normal rate and regular rhythm.   Pulmonary:      Effort: Pulmonary effort is normal. No respiratory distress.      Breath sounds: Normal breath sounds. No stridor. No wheezing, rales, or rhonchi.   Musculoskeletal:         General: Normal range of motion.      Cervical back: Normal range of motion and neck supple.      Extremities: No bilateral calf tenderness or edema appreciated.   Skin:     General: Skin is warm and dry.      Coloration: Skin is not jaundiced.   Neurological:       General: No focal deficit present.      Mental Status: Alert and oriented to person, place, and time. Mental status is at baseline.   Psychiatric:         Mood and Affect: Mood normal.         Behavior: Behavior normal.         Judgment: Judgment normal.     Labs:  Lab Results   Component Value Date    WBC 16.0 (H) 07/03/2024    HGB 13.7 07/03/2024    HCT 41.3 07/03/2024    MCV 86 07/03/2024     07/03/2024      Lab Results   Component Value Date    GLUCOSE 170 (H) 07/02/2024    CALCIUM 9.2 07/02/2024     07/02/2024    K 4.2 07/02/2024    CO2 29 07/02/2024     07/02/2024    BUN 19 07/02/2024    CREATININE 0.81 07/02/2024      Lab Results   Component Value Date    ALT 27 07/02/2024    AST 21 07/02/2024    ALKPHOS 37 07/02/2024    BILITOT 0.6 07/02/2024        Oxygen Therapy  SpO2: 100 %  Medical Gas Therapy: None (Room air)  O2 Delivery Method: Nasal cannula     Intake/Output last 3 Shifts:  I/O last 3 completed shifts:  In: 720 (7.1 mL/kg) [P.O.:720]  Out: - (0 mL/kg)   Weight: 101 kg     Medications   Scheduled medications  cefTRIAXone, 1 g, intravenous, q24h  enoxaparin, 40 mg, subcutaneous, q24h  ipratropium-albuteroL, 3 mL, nebulization, q6h  methylPREDNISolone sodium succinate (PF), 40 mg, intravenous, q12h  pantoprazole, 40 mg, oral, Daily before breakfast   Or  pantoprazole, 40 mg, intravenous, Daily before breakfast  potassium chloride CR, 40 mEq, oral, Once      Continuous medications     PRN medications  PRN medications: acetaminophen **OR** acetaminophen **OR** acetaminophen, acetaminophen **OR** acetaminophen **OR** acetaminophen, codeine-guaifenesin, dextromethorphan-guaifenesin, ipratropium-albuteroL, melatonin, metoclopramide **OR** metoclopramide, ondansetron ODT **OR** ondansetron, oxygen, polyethylene glycol     Allergies  Patient has no known allergies.    Microbiology   6/28/24: Negative blood cultures 2x  6/28/24: Negative Strep pneumo and Legionella    Chest Radiograph   CT  angio chest for pulmonary embolism 06/27/2024    Narrative  Interpreted By:  Wicho Dailey,  STUDY:  CT ANGIO CHEST FOR PULMONARY EMBOLISM;  6/27/2024 11:28 pm    INDICATION:  Signs/Symptoms:Possible PE.    COMPARISON:  None.    ACCESSION NUMBER(S):  [id number]    ORDERING CLINICIAN:  MARGARITO RIVERA    TECHNIQUE:  Contiguous axial images of the chest were obtained after the  intravenous administration of iodinated contrast using angiographic  PE protocol. Coronal and sagittal reformatted images were  reconstructed from the axial data. MIP images were created on an  independent workstation and reviewed.    FINDINGS:  MEDIASTINUM AND LYMPH NODES:  The esophageal wall appears within  normal limits. There are mildly enlarged left hilar lymph nodes are  likely reactive. There are also several prominent right paratracheal,  subcarinal and right hilar lymph nodes that are likely reactive. No  pneumomediastinum.    VESSELS:  Normal caliber thoracic aorta without dissection. Mild  aortic atherosclerosis. Nondiagnostic evaluation for pulmonary  embolism within the lingula secondary to extensive cardiac and  respiratory motion artifact. There is also nondiagnostic evaluation  for pulmonary embolism at and distal to the segmental level in the  right middle lobe to the same. Otherwise, no acute pulmonary embolism  through the segmental level elsewhere.    HEART: Normal in size.  No coronary artery calcifications. No  significant pericardial effusion.    LUNG, AIRWAYS, AND PLEURA: There are bilateral diffuse centrilobular  ground-glass nodules within the bilateral upper lobes and patchy  areas of the centrilobular ground-glass nodules and opacities within  the right and left lower lobe (left > right). Within the lingula,  there is a large dense consolidation with a central area of  ground-glass opacity (reversed halo sign) with overall size measuring  7.8 cm x 3.4 cm.    OSSEOUS STRUCTURES: No acute osseous  abnormality.    CHEST WALL SOFT TISSUES: No discernible abnormality.    UPPER ABDOMEN/OTHER: No acute abnormality. Diffuse hypoattenuation of  the liver consistent with steatosis. Cholelithiasis. No discernible  gallbladder wall thickening, pericholecystic fluid, or stranding.  Heterogeneous 3.3 cm x 3.1 cm right. Simple right upper pole renal  cyst measuring 1.2 cm also noted.    Impression  1. Large reverse-halo sign airspace disease (consolidative opacity  with central ground-glass opacity) in the lingula. Differential  diagnosis consists of pulmonary infarct, fungal pneumonia (most  commonly mucormycosis), other pneumonia including COVID-19 and  bacterial pneumonia amongst others. Note that the there is  nondiagnostic evaluation for pulmonary embolism within the lingula  secondary to extensive cardiac and respiratory motion artifact. There  is also nondiagnostic evaluation for pulmonary embolism at and distal  to the segmental level in the right middle lobe to the same.  Otherwise, no acute pulmonary embolism through the segmental level  elsewhere.    2. Diffuse centrilobular ground-glass nodules and opacities within  the upper lobes and patchy involvement in the lower lobes (left >  right) that is highly atypical for COVID-19 and is most suggestive in  pattern and distribution for bacterial pneumonia or atypical  organisms such as mycobacterial. Integration of the clinical context,  physical exam/laboratory findings, and imaging is recommended.    3. Suspicion for solid enhancing right renal mass measuring 3.3 cm x  3.1 cm; however, this is only partially visualized and suboptimally  evaluated. Dedicated contrast enhanced MRI of the kidneys is  recommended for further evaluation. YELLOW ALERT: An incidental  finding alert was sent per protocol.    4. Cholelithiasis without evidence of acute cholecystitis.    MACRO:  Critical Finding:  New mass in the kidney. Notification was initiated  on 6/28/2024 at 12:35 am  by  Wicho Dailey.  (**-YCF-**)  Instructions:  MR Abdomen w and wo IV contrast. 1 week.    Signed by: Wicho Dailey 6/28/2024 12:36 AM  Dictation workstation:   [id number]       XR chest 2 views 07/03/2024    Narrative  Interpreted By:  Venkat Vallejo,  STUDY:  XR CHEST 2 VIEWS;  7/3/2024 9:22 am    INDICATION:  Signs/Symptoms:Sob, compare previous X-rays for improvement.    COMPARISON:  07/01/2024    ACCESSION NUMBER(S):  [id number]    ORDERING CLINICIAN:  CANDE LEONG    FINDINGS:  Consolidation in the lingula has partially resolved compared to 2  days ago. No new infiltrate or pleural effusion. Cardiomediastinal  silhouette unchanged. No pulmonary vascular congestion.    Impression  Consolidation in the lingula has decreased in size compared to 2 days  ago.    MACRO:  None    Signed by: Venkat Vallejo 7/3/2024 9:33 AM  Dictation workstation:   [id number]      XR chest 2 views 07/01/2024    Narrative  Interpreted By:  Venkat Vallejo,  STUDY:  XR CHEST 2 VIEWS;  7/1/2024 8:33 am    INDICATION:  Signs/Symptoms:pnemonia.    COMPARISON:  06/27/2024    ACCESSION NUMBER(S):  [id number]    ORDERING CLINICIAN:  JOSHUA THOMPSON    FINDINGS:  Consolidation silhouetting the left heart border is more dense. Right  lung grossly clear. No pleural effusion. Unremarkable  cardiomediastinal silhouette. No pulmonary vascular congestion.    Impression  Increased density of lingula consolidation compared to 4 days ago.    MACRO:  None    Signed by: Venkat Vallejo 7/1/2024 8:43 AM  Dictation workstation:   [id number]      XR chest 1 view 06/27/2024    Narrative  Interpreted By:  Kylah Haque,  STUDY:  XR CHEST 1 VIEW 6/27/2024 9:57 pm    INDICATION:  Signs/Symptoms:Chest Pain    COMPARISON:  None    ACCESSION NUMBER(S):  [id number]    ORDERING CLINICIAN:  EMERY PETER    TECHNIQUE:  AP view    FINDINGS:  There is a left lower lung infiltrate. There are low lung volumes.  Pulmonary vascularity and cardiac silhouette appear  normal. No  pleural effusions.    Impression  Left lower lobe infiltrate. Follow-up to complete resolution  recommended.    Signed by: Kylah Haque 6/27/2024 10:15 PM  Dictation workstation:   [id number]     Assessment and Plan / Recommendations   Assessment/Plan     This is a 59 y/o smoker female with no known significant PMHx and is recently COVID-19 positive, who was admitted for Community-acquired Pneumonia.     Plan:   - Agree with Augmentin per ID recommendations  - Pt did not desat during and after ambulation yesterday. Repeat CXR today revealed improvement of consolidation in the lingula. Since she clinically improved and is stable for discharge, we would like her to follow up with pulm outpt clinic in the next week for repeat CXR     Pao Mims, DO  Internal Medicine PGY-1    Date: 07/03/24 Time: 3:24 PM  Please excuse any misspellings or unintended errors related to the Dragon speech recognition software used to dictate this note.

## 2024-07-03 NOTE — DISCHARGE SUMMARY
Discharge Diagnosis  Shortness of breath  COVID  Pneumonia  Right renal mass concerning for malignancy    Issues Requiring Follow-Up:    Please see PCP in 1 week  Please see pulmonology dr torres in 1-2 weeks  Get repeat cxray or CT as outpatient with pulmonology to ensure resolution/improvement of pneumonia    Take augmentin twice a day for 7 days for your pneumonia  Take medrol dose pack/steroids as Rx  Albuterol inhaler is Rx as needed for wheezing or SOB    Return to the ER if any worsening signs/symptoms or issues    See urology dr morrison for follow up re: renal mass, concern for malignancy, you will need this removed as we discussed. Please call [facility] to schedule follow up asap.    Discharge Meds     Your medication list        START taking these medications        Instructions Last Dose Given Next Dose Due   albuterol 90 mcg/actuation inhaler  Commonly known as: ProAir HFA      Inhale 2 puffs every 6 hours if needed for wheezing or shortness of breath.       amoxicillin-pot clavulanate 875-125 mg tablet  Commonly known as: Augmentin      Take 1 tablet (875 mg) by mouth 2 times a day for 7 days.       methylPREDNISolone 4 mg tablets  Commonly known as: Medrol Dospak      Take as directed on package.                 Where to Get Your Medications        These medications were sent to [facility]  [address], [address], [place] [address]      Hours: 8:30 AM to 5 PM Mon-Fri, 9 AM to 1 PM Sat Phone: [phone]   albuterol 90 mcg/actuation inhaler  amoxicillin-pot clavulanate 875-125 mg tablet  methylPREDNISolone 4 mg tablets         Test Results Pending At Discharge  Pending Labs       No current pending labs.            Hospital Course   Kika Marcelino is a 58 y.o. female presenting with chief complaint of shortness of breath and malaise.  Patient endorses a positive COVID-19 diagnosis 1 week from the day of admission.  States that she began to experience symptoms including cough,  malaise, chest congestion with coworkers having tested positive as well as their direct family members.  She states that she took 2 home COVID-19 tests, both of which were noted to be positive.  She does endorse mild dyspnea but otherwise states she believes her course has been mild.  Patient's niece who is present at bedside provided corroborating information and patient does state that it was at the urging of her family that she sought evaluation today.  Initial evaluation in the emergency department noted that patient has been requiring 4 L of oxygen to maintain appropriate SpO2 levels.  Remainder of laboratory workup was grossly unremarkable.  CT of the chest was obtained which showed a large reverse halo sign airspace disease involving the lingula with differential including pulmonary infarct, fungal pneumonia, or atypical bacterial infections.  In addition, diffuse centrilobular groundglass nodules and opacities within the upper lobe and patchy involvement in the lower lobes was noted, highly atypical for COVID-19.  Concerning for atypical etiology, such as mycobacterial.  In addition, incidental finding noted of a solid enhancing right renal mass measuring 3.3 x 3.1 cm only partially visualized.  Patient admitted for further management.     Hospital course:  Patient is a pleasant 58-year-old female with no significant past medical history who presented with acute onset shortness of breath, malaise, cough and congestion.  She was recently diagnosed with COVID a week ago.  She attended a concert.  Patient came in for acute hypoxic respiratory failure, she was placed on 4 L nasal cannula.  CT of the chest was obtained and showed concern for pneumonia or an atypical infection.  She was started on IV azithromycin and Rocephin.  Pulmonology and infectious disease were consulted.  Patient was also found to have an incidental right renal mass.  Urology was consulted, further imaging was done and there is concern of  malignancy.  Patient will need to follow-up with urology as an outpatient for removal/nephrectomy.  Patient's oxygenation improved throughout her stay.  She was treated for pneumonia.  She was also given IV steroids.  She was weaned off oxygen 2 days ago, she has been stable on room air.  Repeat x-ray imaging today shows improvement of the pneumonia.  Pulmonology initially was considering doing a bronchoscopy however today she finally has showed improvement.  Patient will need to follow-up with pulmonology in 1 to 2 weeks for repeat imaging to ensure improvement/resolution of the pneumonia.  Her ambulatory pulse ox was stable, yesterday she was 95% on room air with ambulation.  Patient is stable for DC home with close follow-up.    Discharge instructions provided to the patient were:  Please see PCP in 1 week  Please see pulmonology dr torres in 1-2 weeks  Get repeat cxray or CT as outpatient with pulmonology to ensure resolution/improvement of pneumonia    Take augmentin twice a day for 7 days for your pneumonia  Take medrol dose pack/steroids as Rx  Albuterol inhaler is Rx as needed for wheezing or SOB    Return to the ER if any worsening signs/symptoms or issues    See urology dr lopes for follow up re: renal mass, concern for malignancy, you will need this removed as we discussed. [facility]    Pertinent Physical Exam At Time of Discharge    Vitals reviewed.   Constitutional:       Appearance: Normal appearance.   HENT:      Head: Normocephalic and atraumatic.      Nose: Nose normal.   Cardiovascular:      Rate and Rhythm: Normal rate and regular rhythm.      Pulses: Normal pulses.      Heart sounds: Normal heart sounds.   Pulmonary:      Effort: Pulmonary effort is normal.      Breath sounds: Normal breath sounds. No rales.      Comments: + cough  Abdominal:      General: Abdomen is flat. Bowel sounds are normal.      Palpations: Abdomen is soft.      Tenderness: There is no  abdominal tenderness.   Skin:     General: Skin is warm and dry.   Neurological:      Mental Status: She is alert and oriented to person, place, and time. Mental status is at baseline.   Psychiatric:         Mood and Affect: Mood normal.     Outpatient Follow-Up  Future Appointments   Date Time Provider Department Center   8/12/2024  1:00 PM Gerard Nixon MD [id number] [place]         Ana Mcmahon, DO

## 2024-07-03 NOTE — NURSING NOTE
Pt calm and cooperative through shift. Pt did not complain of pain. Pt rested through shift and had chest xray. Pt seen by pulm and attending and will be discharged to home after meds to beds brings he rmeds.

## 2024-07-05 ENCOUNTER — PHARMACY VISIT (OUTPATIENT)
Dept: PHARMACY | Facility: CLINIC | Age: 59
End: 2024-07-05
Payer: MEDICARE

## 2024-07-07 LAB
ATRIAL RATE: 74 BPM
ATRIAL RATE: 79 BPM
P AXIS: 52 DEGREES
P AXIS: 60 DEGREES
P OFFSET: 198 MS
P OFFSET: 199 MS
P ONSET: 147 MS
P ONSET: 150 MS
PR INTERVAL: 142 MS
PR INTERVAL: 146 MS
Q ONSET: 218 MS
Q ONSET: 223 MS
QRS COUNT: 12 BEATS
QRS COUNT: 13 BEATS
QRS DURATION: 76 MS
QRS DURATION: 84 MS
QT INTERVAL: 382 MS
QT INTERVAL: 392 MS
QTC CALCULATION(BAZETT): 424 MS
QTC CALCULATION(BAZETT): 449 MS
QTC FREDERICIA: 410 MS
QTC FREDERICIA: 429 MS
R AXIS: -11 DEGREES
R AXIS: -9 DEGREES
T AXIS: 18 DEGREES
T AXIS: 23 DEGREES
T OFFSET: 414 MS
T OFFSET: 414 MS
VENTRICULAR RATE: 74 BPM
VENTRICULAR RATE: 79 BPM

## 2024-07-08 NOTE — PROGRESS NOTES
Patient: Kika Marcelino    [id number]  : 1965 -- AGE 58 y.o.    Provider: AUNDREA Michel     Location [facility]   Service Date: 2024              [facility]  New Visit Note      HISTORY OF PRESENT ILLNESS     The patient's referring provider is: No ref. provider found    HISTORY OF PRESENT ILLNESS   Kika Marcelino is a 58 y.o. female who presents to a [facility] for an evaluation with concerns of post hospitalization followup with COVID and acute respiratory failure, patient continues to report dyspnea and wheezing. I have independently interviewed and examined the patient in the office and reviewed available records.    Current History  Patient recently discharged from the hospital on 7/3/2024, she completed augmentin twice a day for 7 days for your pneumonia and medrol dose pack. She has not used albuterol inhaler.     On today's visit, the patient reports reports she is able to get more words in her breaths she is out of breath constant coughing and shortness of breath and wheezing. She is able to breath on her stomach, she coughed so much she bruised her rib.  At baseline,  has dyspnea on exertion, but none at rest. Symptoms started many years ago, but has mostly been stable.  Currently sits for most of the day, works inside the house, but does not carry loads and do strenuous exercise. They are active in her everyday life and carries loads and does strenuous exercise. Has to stop for breath after walking about 100 meters or a few minutes (mMRC 3).    Relates orthopnea, denies pnd, or dustin.   Weight has been mostly stable.  C/o chronic cough, wheezing, and denies productive cough No night cough. No hemoptysis. No fever or shivering chills. Has no runny nose, or a tingling sensation in the back of his throat. Denies chest pain or heartburn. Last dose of antibiotics will be on 7/10 and has 2 more days of  prednisone     Previous pulmonary history:  no history of recurrent infections, or lung disease as a child.  No previous lung hx, never on oxygen or inhaler therapy.     Inhalers/nebulized medications: albuterol    Hospitalization History: Admitted with covid on 6/20/2024 - and acute respiratory failure, has not had COVID previously     Sleep history:  Complains of snoring, denies apnea, denies feeling tired during the day, and taking naps during the day.     ALLERGIES AND MEDICATIONS     ALLERGIES  No Known Allergies    MEDICATIONS  Current Outpatient Medications   Medication Sig Dispense Refill    albuterol (ProAir HFA) 90 mcg/actuation inhaler Inhale 2 puffs every 6 hours if needed for wheezing or shortness of breath. 8.5 g 0    amoxicillin-pot clavulanate (Augmentin) 875-125 mg tablet Take 1 tablet (875 mg) by mouth 2 times a day for 7 days. 14 tablet 0    methylPREDNISolone (Medrol Dospak) 4 mg tablets Take as directed on package. 21 tablet 0     No current facility-administered medications for this visit.         PAST HISTORY     PAST MEDICAL HISTORY  She  has no past medical history on file.     PAST SURGICAL HISTORY  No past surgical history on file.    IMMUNIZATION HISTORY  Immunization History   Administered Date(s) Administered    Moderna SARS-CoV-2 Vaccination 04/14/2021, 05/14/2021       SOCIAL HISTORY  She  reports that she has been smoking cigarettes. She has never used smokeless tobacco. She reports current alcohol use. She reports current drug use. Drug: Marijuana. She Patient  1/4ppd x 40 years     OCCUPATIONAL/ENVIRONMENTAL HISTORY  Currently works as:    DOES/DOES NOT EC: does not have known exposure to asbestos, silica, beryllium or inhaled metals.  DOES/DOES NOT EC: does not have exposure to birds or exotic animals. Has 2 office cats     FAMILY HISTORY  No family history on file.  DOES/DOES NOT EC: does have a family history of pulmonary disease. Sister had lung cancer, both  parents smokers   DOES/DOES NOT EC: does have a family history of cancer. Maternal siblings had different types of cancer   DOES/DOES NOT EC: does not have a family history of autoimmune disorders.    RESULTS/DATA     Pulmonary Function Test Results     No testing done     Chest Radiograph     XR chest 2 views 07/03/2024    Narrative  Interpreted By:  Venkat Vallejo,  STUDY:  XR CHEST 2 VIEWS;  7/3/2024 9:22 am    INDICATION:  Signs/Symptoms:Sob, compare previous X-rays for improvement.    COMPARISON:  07/01/2024    FINDINGS:  Consolidation in the lingula has partially resolved compared to 2  days ago. No new infiltrate or pleural effusion. Cardiomediastinal  silhouette unchanged. No pulmonary vascular congestion.    Impression  Consolidation in the lingula has decreased in size compared to 2 days  ago.      Chest CT Scan     CT angio chest for pulmonary embolism 06/27/2024    Narrative  Interpreted By:  Wicho Dailey,  STUDY:  CT ANGIO CHEST FOR PULMONARY EMBOLISM;  6/27/2024 11:28 pm    INDICATION:  Signs/Symptoms:Possible PE.    COMPARISON:  None.    FINDINGS:  MEDIASTINUM AND LYMPH NODES:  The esophageal wall appears within  normal limits. There are mildly enlarged left hilar lymph nodes are  likely reactive. There are also several prominent right paratracheal,  subcarinal and right hilar lymph nodes that are likely reactive. No  pneumomediastinum.    VESSELS:  Normal caliber thoracic aorta without dissection. Mild  aortic atherosclerosis. Nondiagnostic evaluation for pulmonary  embolism within the lingula secondary to extensive cardiac and  respiratory motion artifact. There is also nondiagnostic evaluation  for pulmonary embolism at and distal to the segmental level in the  right middle lobe to the same. Otherwise, no acute pulmonary embolism  through the segmental level elsewhere.    HEART: Normal in size.  No coronary artery calcifications. No  significant pericardial effusion.    LUNG, AIRWAYS, AND  PLEURA: There are bilateral diffuse centrilobular  ground-glass nodules within the bilateral upper lobes and patchy  areas of the centrilobular ground-glass nodules and opacities within  the right and left lower lobe (left > right). Within the lingula,  there is a large dense consolidation with a central area of  ground-glass opacity (reversed halo sign) with overall size measuring  7.8 cm x 3.4 cm.    OSSEOUS STRUCTURES: No acute osseous abnormality.    CHEST WALL SOFT TISSUES: No discernible abnormality.    UPPER ABDOMEN/OTHER: No acute abnormality. Diffuse hypoattenuation of  the liver consistent with steatosis. Cholelithiasis. No discernible  gallbladder wall thickening, pericholecystic fluid, or stranding.  Heterogeneous 3.3 cm x 3.1 cm right. Simple right upper pole renal  cyst measuring 1.2 cm also noted.    Impression  1. Large reverse-halo sign airspace disease (consolidative opacity  with central ground-glass opacity) in the lingula. Differential  diagnosis consists of pulmonary infarct, fungal pneumonia (most  commonly mucormycosis), other pneumonia including COVID-19 and  bacterial pneumonia amongst others. Note that the there is  nondiagnostic evaluation for pulmonary embolism within the lingula  secondary to extensive cardiac and respiratory motion artifact. There  is also nondiagnostic evaluation for pulmonary embolism at and distal  to the segmental level in the right middle lobe to the same.  Otherwise, no acute pulmonary embolism through the segmental level  elsewhere.    2. Diffuse centrilobular ground-glass nodules and opacities within  the upper lobes and patchy involvement in the lower lobes (left >  right) that is highly atypical for COVID-19 and is most suggestive in  pattern and distribution for bacterial pneumonia or atypical  organisms such as mycobacterial. Integration of the clinical context,  physical exam/laboratory findings, and imaging is recommended.    3. Suspicion for solid  "enhancing right renal mass measuring 3.3 cm x  3.1 cm; however, this is only partially visualized and suboptimally  evaluated. Dedicated contrast enhanced MRI of the kidneys is  recommended for further evaluation. YELLOW ALERT: An incidental  finding alert was sent per protocol.    4. Cholelithiasis without evidence of acute cholecystitis      Echocardiogram     No testing done        REVIEW OF SYSTEMS     REVIEW OF SYSTEMS  Review of Systems   Constitutional:  Positive for activity change.   Respiratory:  Positive for cough, chest tightness, shortness of breath and wheezing.    All other systems reviewed and are negative.        PHYSICAL EXAM     VITAL SIGNS: There were no vitals taken for this visit. /80   Pulse 87   Resp 18   Ht 1.651 m (5' 5\")   Wt 98.6 kg (217 lb 6.4 oz)   SpO2 95%   BMI 36.18 kg/m²      CURRENT WEIGHT: [unfilled]  BMI: [unfilled]  PREVIOUS WEIGHTS:  Wt Readings from Last 3 Encounters:   07/01/24 101 kg (222 lb 9.6 oz)       Physical Exam  Constitutional:       Appearance: She is ill-appearing.   HENT:      Head: Normocephalic and atraumatic.   Eyes:      Extraocular Movements: Extraocular movements intact.      Conjunctiva/sclera: Conjunctivae normal.      Pupils: Pupils are equal, round, and reactive to light.   Cardiovascular:      Rate and Rhythm: Normal rate and regular rhythm.   Pulmonary:      Effort: Respiratory distress present.      Breath sounds: Wheezing present.   Abdominal:      Palpations: Abdomen is soft.   Musculoskeletal:         General: Normal range of motion.      Cervical back: Normal range of motion and neck supple.   Skin:     General: Skin is warm and dry.   Neurological:      General: No focal deficit present.      Mental Status: She is alert. Mental status is at baseline.   Psychiatric:         Mood and Affect: Mood normal.         Behavior: Behavior normal.         Thought Content: Thought content normal.         Judgment: Judgment normal. "         ASSESSMENT/PLAN     Ms. Marcelino is a 58 y.o. female and  has no past medical history on file. She was referred to the [facility] for evaluation of post COVID cough, dyspnea and acute respiratory failure    Problem List and Orders      Assessment and Plan / Recommendations:  Problem List Items Addressed This Visit    None          post COVID cough, dyspnea and acute respiratory failure  - repeat course of steriods as dyspneic and continues to wheeze  - repeat CT chest non-contrast  - obtain PFTS   - order ICS/LABA/LAMA  will give free sample of breztri 2 puffs twice a day rinse afterwards  - albuterol hfa 2 puffs or albuterol nebs every 4-6 hours as needed      6 minute walk test completed in the office patient pulse ox stayed 95-98%               Thank you for visiting the Pulmonary clinic today!       Return to clinic after _4_weeks and after PFTs, CT scan complete  or sooner if needed   Suzie Chun CNP  My office number is [phone] -     Best way to get a hold of me is to call my office --> Please do not send me follow my health messages  Any test results will be discussed at next visit -- please make sure to make a follow up appt after testing.

## 2024-07-09 ENCOUNTER — OFFICE VISIT (OUTPATIENT)
Dept: PULMONOLOGY | Facility: CLINIC | Age: 59
End: 2024-07-09
Payer: COMMERCIAL

## 2024-07-09 VITALS
HEIGHT: 65 IN | HEART RATE: 87 BPM | BODY MASS INDEX: 36.22 KG/M2 | DIASTOLIC BLOOD PRESSURE: 80 MMHG | WEIGHT: 217.4 LBS | SYSTOLIC BLOOD PRESSURE: 141 MMHG | RESPIRATION RATE: 18 BRPM | OXYGEN SATURATION: 95 %

## 2024-07-09 DIAGNOSIS — R06.00 DYSPNEA, UNSPECIFIED TYPE: Primary | ICD-10-CM

## 2024-07-09 DIAGNOSIS — R06.09 COVID-19 LONG HAULER MANIFESTING CHRONIC DYSPNEA: ICD-10-CM

## 2024-07-09 DIAGNOSIS — R91.8 GROUND GLASS OPACITY PRESENT ON IMAGING OF LUNG: ICD-10-CM

## 2024-07-09 DIAGNOSIS — U09.9 COVID-19 LONG HAULER MANIFESTING CHRONIC DYSPNEA: ICD-10-CM

## 2024-07-09 PROCEDURE — 99215 OFFICE O/P EST HI 40 MIN: CPT | Performed by: NURSE PRACTITIONER

## 2024-07-09 RX ORDER — PREDNISONE 10 MG/1
TABLET ORAL
Qty: 31 TABLET | Refills: 0 | Status: SHIPPED | OUTPATIENT
Start: 2024-07-09 | End: 2024-08-08

## 2024-07-09 ASSESSMENT — PATIENT HEALTH QUESTIONNAIRE - PHQ9
SUM OF ALL RESPONSES TO PHQ9 QUESTIONS 1 AND 2: 0
1. LITTLE INTEREST OR PLEASURE IN DOING THINGS: NOT AT ALL
2. FEELING DOWN, DEPRESSED OR HOPELESS: NOT AT ALL

## 2024-07-09 ASSESSMENT — ENCOUNTER SYMPTOMS
CHEST TIGHTNESS: 1
ACTIVITY CHANGE: 1
COUGH: 1
WHEEZING: 1
SHORTNESS OF BREATH: 1
DEPRESSION: 0
LOSS OF SENSATION IN FEET: 0
OCCASIONAL FEELINGS OF UNSTEADINESS: 0

## 2024-07-09 NOTE — PATIENT INSTRUCTIONS
post COVID cough, dyspnea and acute respiratory failure  - repeat course of steriods as dyspneic and continues to wheeze  - repeat CT chest non-contrast  - obtain PFTS   - order ICS/LABA/LAMA  will give free sample of breztri 2 puffs twice a day rinse afterwards  - albuterol hfa 2 puffs or albuterol nebs every 4-6 hours as needed      6 minute walk test completed in the office patient pulse ox stayed 95-98%               Thank you for visiting the Pulmonary clinic today!       Return to clinic after _4_weeks and after PFTs, CT scan complete  or sooner if needed   Suzie Chun CNP  My office number is [phone] -     Best way to get a hold of me is to call my office --> Please do not send me follow my health messages  Any test results will be discussed at next visit -- please make sure to make a follow up appt after testing.

## 2024-07-15 ENCOUNTER — TELEPHONE (OUTPATIENT)
Dept: PULMONOLOGY | Facility: CLINIC | Age: 59
End: 2024-07-15
Payer: COMMERCIAL

## 2024-07-15 NOTE — TELEPHONE ENCOUNTER
Patient called in saying that she is coughing a lot more than she was last week. She has a lot of pain on her left side. Not sure if the mucus is breaking up or if something else is going on or if she needs a chest x-ray. Please advise

## 2024-07-16 ENCOUNTER — APPOINTMENT (OUTPATIENT)
Dept: PRIMARY CARE | Facility: CLINIC | Age: 59
End: 2024-07-16
Payer: COMMERCIAL

## 2024-07-16 NOTE — TELEPHONE ENCOUNTER
Spoke to patient she is feeling better today yesterday had worsening coughing episode and developed side pain now is feeling better she is taking ibuprofen she denies chest pain fever chills worsening shortness of breath she was advised if any of those symptoms develop she is going to go to the emergency room there is a CT chest ordered routine but if develops any symptoms we will change that to stat

## 2024-07-23 ENCOUNTER — HOSPITAL ENCOUNTER (OUTPATIENT)
Dept: RADIOLOGY | Facility: HOSPITAL | Age: 59
Discharge: HOME | End: 2024-07-23
Payer: COMMERCIAL

## 2024-07-23 DIAGNOSIS — R06.09 COVID-19 LONG HAULER MANIFESTING CHRONIC DYSPNEA: ICD-10-CM

## 2024-07-23 DIAGNOSIS — R06.00 DYSPNEA, UNSPECIFIED TYPE: ICD-10-CM

## 2024-07-23 DIAGNOSIS — U09.9 COVID-19 LONG HAULER MANIFESTING CHRONIC DYSPNEA: ICD-10-CM

## 2024-07-23 DIAGNOSIS — R91.8 GROUND GLASS OPACITY PRESENT ON IMAGING OF LUNG: ICD-10-CM

## 2024-07-23 PROCEDURE — 71250 CT THORAX DX C-: CPT | Performed by: RADIOLOGY

## 2024-07-23 PROCEDURE — 71250 CT THORAX DX C-: CPT

## 2024-07-26 ASSESSMENT — ENCOUNTER SYMPTOMS
ACTIVITY CHANGE: 1
CHEST TIGHTNESS: 1
WHEEZING: 1
COUGH: 1
SHORTNESS OF BREATH: 1

## 2024-07-26 NOTE — PROGRESS NOTES
Patient: Kika aMrcelino    [id number]  : 1965 -- AGE 58 y.o.    Provider: AUNDREA Michel     Location [facility]   Service Date: 2024              [facility]  New Visit Note      HISTORY OF PRESENT ILLNESS     The patient's referring provider is: No ref. provider found    HISTORY OF PRESENT ILLNESS   Kika Marcelino is a 58 y.o. female who presents to a [facility] for an evaluation with concerns of post hospitalization followup with COVID and acute respiratory failure, patient continues to report dyspnea and wheezing. I have independently interviewed and examined the patient in the office and reviewed available records.    Current History  Patient recently discharged from the hospital on 7/3/2024, she completed augmentin twice a day for 7 days for your pneumonia and medrol dose pack. She has not used albuterol inhaler.     On today's visit, the patient reports reports she is able to get more words in her breaths she is out of breath constant coughing and shortness of breath and wheezing. She is able to breath on her stomach, she coughed so much she bruised her rib.  At baseline,  has dyspnea on exertion, but none at rest. Symptoms started many years ago, but has mostly been stable.  Currently sits for most of the day, works inside the house, but does not carry loads and do strenuous exercise. They are active in her everyday life and carries loads and does strenuous exercise. Has to stop for breath after walking about 100 meters or a few minutes (mMRC 3).    Relates orthopnea, denies pnd, or dustin.   Weight has been mostly stable.  C/o chronic cough, wheezing, and denies productive cough No night cough. No hemoptysis. No fever or shivering chills. Has no runny nose, or a tingling sensation in the back of his throat. Denies chest pain or heartburn. Last dose of antibiotics will be on 7/10 and has 2 more days of  prednisone     Previous pulmonary history:  no history of recurrent infections, or lung disease as a child.  No previous lung hx, never on oxygen or inhaler therapy.     Inhalers/nebulized medications: albuterol    Hospitalization History: Admitted with covid on 6/20/2024 - and acute respiratory failure, has not had COVID previously     Sleep history:  Complains of snoring, denies apnea, denies feeling tired during the day, and taking naps during the day.     Current History 8/6/2024    On today's visit, the patient reports continues dry cough, feels like its loosening but non productive. She is not using albuterol. She is using breztri, constant midsternal pain, worse in bed laying on chest. C/o wheeze  Sh is able to do chores and live 2nd floor of condo and able to do stairs.   Denies Fevers/chills GERD, ER visits  She has fatigue she snores, denies waking up feeling tired    Allergies - denies runny nose or PND  Night time - cough at night       ALLERGIES AND MEDICATIONS     ALLERGIES  No Known Allergies    MEDICATIONS  Current Outpatient Medications   Medication Sig Dispense Refill    albuterol (ProAir HFA) 90 mcg/actuation inhaler Inhale 2 puffs every 6 hours if needed for wheezing or shortness of breath. 8.5 g 0    budesonide-glycopyr-formoterol (BREZTRI) 160-9-4.8 mcg/actuation HFA aerosol inhaler Inhale 2 puffs 2 times a day. 10.7 g 3    ibuprofen (Motrin) capsule Take by mouth.       No current facility-administered medications for this visit.         PAST HISTORY     PAST MEDICAL HISTORY  She  has no past medical history on file.     PAST SURGICAL HISTORY  Past Surgical History:   Procedure Laterality Date    OTHER SURGICAL HISTORY      DNC  done 2 y ago-4 .2022       IMMUNIZATION HISTORY  Immunization History   Administered Date(s) Administered    Moderna SARS-CoV-2 Vaccination 04/14/2021, 05/14/2021       SOCIAL HISTORY  She  reports that she has been smoking cigarettes. She has never used smokeless  tobacco. She reports current alcohol use. She reports current drug use. Drug: Marijuana. She Patient  1/4ppd x 40 years     OCCUPATIONAL/ENVIRONMENTAL HISTORY  Currently works as:    DOES/DOES NOT EC: does not have known exposure to asbestos, silica, beryllium or inhaled metals.  DOES/DOES NOT EC: does not have exposure to birds or exotic animals. Has 2 office cats     FAMILY HISTORY  Family History   Problem Relation Name Age of Onset    Breast cancer Mother      Heart attack Father      Lung cancer Sister      Cancer Mother's Sister      Cancer Mother's Brother       DOES/DOES NOT EC: does have a family history of pulmonary disease. Sister had lung cancer, both parents smokers   DOES/DOES NOT EC: does have a family history of cancer. Maternal siblings had different types of cancer   DOES/DOES NOT EC: does not have a family history of autoimmune disorders.    RESULTS/DATA     Pulmonary Function Test Results     No testing done     Chest Radiograph     XR chest 2 views 07/03/2024    COMPARISON:  07/01/2024    FINDINGS:  Consolidation in the lingula has partially resolved compared to 2  days ago. No new infiltrate or pleural effusion. Cardiomediastinal  silhouette unchanged. No pulmonary vascular congestion.    Impression  Consolidation in the lingula has decreased in size compared to 2 days  ago.      Chest CT Scan   CT CHEST WO IV CONTRAST;  7/23/2024       INDICATION:  Signs/Symptoms:dyspnea,large GGO.      COMPARISON:  CT chest 06/27/2024.      ACCESSION NUMBER(S):  [id number]      ORDERING CLINICIAN:  ANTONY BALDWIN      TECHNIQUE:  Helical data acquisition of the chest was obtained without  intravenous contrast. Images were reformatted in axial, coronal, and  sagittal planes.      FINDINGS:  LUNGS AND AIRWAYS:  The trachea and central airways are patent. No endobronchial lesion  is seen. Again noted is mass effect on the adjacent lingular  segmental and subsegmental bronchi (series 3 image 18  and 14).      Interval improvement in aeration of the bilateral lungs when compared  to prior exam with near complete resolution of the previously noted  multifocal areas of ground-glass, tree-in-bud nodularity as described  on previous examination. Residual subtle ground-glass opacities in a  tree-in-bud distribution are noted with the anterior aspect of the  right upper lobe (series 3, image 77) surrounding residual area of  parenchymal opacity which may represent volume loss versus infection  versus combination of the two.      Near complete interval resolution of the previously described large  area of airspace consolidation within the lingula with small amount  of what is favored to be postobstructive volume loss due to  compression of the cardiac silhouette on segmental and subsegmental  lingular bronchi as described above. No new discrete suspicious  pulmonary nodularity identified. No pleural effusion or pneumothorax.  Mild right apical anterior scarring noted.          MEDIASTINUM AND JOE, LOWER NECK AND AXILLA:  The visualized thyroid gland is within normal limits.  No evidence of thoracic lymphadenopathy by CT criteria. Pericardial  lymph nodes noted measuring up to 7 mm, similar to prior exam.  Esophagus is patulous but otherwise unremarkable.      HEART AND VESSELS:  The thoracic aorta normal in course and caliber.There is mild  scattered calcified atherosclerosis present. Main pulmonary artery  and its branches are normal in caliber. Mild coronary artery  calcifications are seen.Please note,the study is not optimized for  evaluation of coronary arteries. The cardiac chambers are not  enlarged. There is trace pericardial effusion, similar prior exam.      UPPER ABDOMEN:  Large partially calcified gallstones noted without evidence to  suggest cholecystitis. Otherwise, the visualized subdiaphragmatic  structures are unremarkable in appearance.      CHEST WALL AND OSSEOUS STRUCTURES:  Chest wall is within  "normal limits.  No suspicious osseous lesion or acute osseous injury.Mild multilevel  discogenic degenerative changes.      IMPRESSION:  1. Near-complete resolution of the previously described multifocal  areas of ground-glass tree-in-bud nodularity compared to prior exam  from 03/27/2024. Residual area of mild tree-in-bud nodularity  surround an area of consolidation within the anterior inferior aspect  of the right upper lobe favored to represent residual  infectious/inflammatory process.  2. Near-complete resolution of the large consolidative opacity within  the lingula with residual area of suspected postobstructive  atelectasis. Component of superimposed residual  infectious/inflammatory process not entirely excluded. Follow-up  chest CT in 3 months time is recommended to reassess.  3. Redemonstration of pericardial lymph node measuring up to 7 mm,  given recent findings of right renal neoplasm, attention on follow-up  recommended.  4. Partially visualized cholelithiasis without evidence to suggest  cholecystitis.  5. Additional findings as above.        Echocardiogram     No testing done     REVIEW OF SYSTEMS     REVIEW OF SYSTEMS  Review of Systems   Constitutional:  Positive for activity change.   Respiratory:  Positive for cough, chest tightness, shortness of breath and wheezing.    All other systems reviewed and are negative.        PHYSICAL EXAM     VITAL SIGNS: /88   Pulse 85   Resp 18   Ht 1.651 m (5' 5\")   Wt 98.1 kg (216 lb 3.2 oz)   SpO2 94%   BMI 35.98 kg/m²  /88   Pulse 85   Resp 18   Ht 1.651 m (5' 5\")   Wt 98.1 kg (216 lb 3.2 oz)   SpO2 94%   BMI 35.98 kg/m²      CURRENT WEIGHT: [unfilled]  BMI: [unfilled]  PREVIOUS WEIGHTS:  Wt Readings from Last 3 Encounters:   08/06/24 98.1 kg (216 lb 3.2 oz)   08/01/24 97.4 kg (214 lb 12.8 oz)   07/09/24 98.6 kg (217 lb 6.4 oz)       Physical Exam  Constitutional:       Appearance: She is ill-appearing.   HENT:      Head: Normocephalic and " atraumatic.   Eyes:      Extraocular Movements: Extraocular movements intact.      Conjunctiva/sclera: Conjunctivae normal.      Pupils: Pupils are equal, round, and reactive to light.   Cardiovascular:      Rate and Rhythm: Normal rate and regular rhythm.   Pulmonary:      Effort: Respiratory distress present.      Breath sounds: Wheezing present.   Abdominal:      Palpations: Abdomen is soft.   Musculoskeletal:         General: Normal range of motion.      Cervical back: Normal range of motion and neck supple.   Skin:     General: Skin is warm and dry.   Neurological:      General: No focal deficit present.      Mental Status: She is alert. Mental status is at baseline.   Psychiatric:         Mood and Affect: Mood normal.         Behavior: Behavior normal.         Thought Content: Thought content normal.         Judgment: Judgment normal.         ASSESSMENT/PLAN     Ms. Marcelino is a 58 y.o. female and  has no past medical history on file. She was referred to the [facility] for evaluation of post COVID cough, dyspnea and acute respiratory failure    Problem List and Orders      Assessment and Plan / Recommendations:  Problem List Items Addressed This Visit    None          post COVID cough, dyspnea and acute respiratory failure -  - improving slowly - repeat CT 7//24/2024 and 6/27/2024 compared and improved  - repeat CT chest non-contrast 3 months  - obtain PFTS   - cont ICS/LABA/LAMA  will give free sample of breztri 2 puffs twice a day rinse afterwards  - albuterol hfa 2 puffs or albuterol nebs every 4-6 hours as needed  - she takes dayquil at night   [facility]        Thank you for visiting the Pulmonary clinic today!       Return to clinic after _4_weeks and after PFTs, CT scan complete  or sooner if needed   Suzie Chun CNP  My office number is [phone] -     Best way to get a hold of me is to call my office --> Please do not send me follow  my health messages  Any test results will be discussed at next visit -- please make sure to make a follow up appt after testing.

## 2024-08-01 ENCOUNTER — APPOINTMENT (OUTPATIENT)
Dept: PRIMARY CARE | Facility: CLINIC | Age: 59
End: 2024-08-01
Payer: COMMERCIAL

## 2024-08-01 VITALS
HEART RATE: 91 BPM | OXYGEN SATURATION: 97 % | DIASTOLIC BLOOD PRESSURE: 78 MMHG | HEIGHT: 65 IN | TEMPERATURE: 97.1 F | SYSTOLIC BLOOD PRESSURE: 122 MMHG | BODY MASS INDEX: 35.79 KG/M2 | RESPIRATION RATE: 18 BRPM | WEIGHT: 214.8 LBS

## 2024-08-01 DIAGNOSIS — K80.20 CALCULUS OF GALLBLADDER WITHOUT CHOLECYSTITIS WITHOUT OBSTRUCTION: ICD-10-CM

## 2024-08-01 DIAGNOSIS — N28.89 RENAL MASS: ICD-10-CM

## 2024-08-01 DIAGNOSIS — J18.9 PRIMARY ATYPICAL PNEUMONIA: ICD-10-CM

## 2024-08-01 DIAGNOSIS — U09.9 COVID-19 LONG HAULER MANIFESTING CHRONIC DYSPNEA: ICD-10-CM

## 2024-08-01 DIAGNOSIS — J96.01 ACUTE HYPOXEMIC RESPIRATORY FAILURE (MULTI): Primary | ICD-10-CM

## 2024-08-01 DIAGNOSIS — R06.09 COVID-19 LONG HAULER MANIFESTING CHRONIC DYSPNEA: ICD-10-CM

## 2024-08-01 DIAGNOSIS — Z09 HOSPITAL DISCHARGE FOLLOW-UP: ICD-10-CM

## 2024-08-01 DIAGNOSIS — R06.00 DYSPNEA, UNSPECIFIED TYPE: ICD-10-CM

## 2024-08-01 DIAGNOSIS — R91.8 GROUND GLASS OPACITY PRESENT ON IMAGING OF LUNG: ICD-10-CM

## 2024-08-01 PROCEDURE — 99205 OFFICE O/P NEW HI 60 MIN: CPT | Performed by: INTERNAL MEDICINE

## 2024-08-01 PROCEDURE — 3008F BODY MASS INDEX DOCD: CPT | Performed by: INTERNAL MEDICINE

## 2024-08-01 RX ORDER — ALBUTEROL SULFATE 90 UG/1
2 AEROSOL, METERED RESPIRATORY (INHALATION) EVERY 6 HOURS PRN
Qty: 8.5 G | Refills: 0 | Status: SHIPPED | OUTPATIENT
Start: 2024-08-01

## 2024-08-01 RX ORDER — PHENYLPROPANOLAMINE/CLEMASTINE 75-1.34MG
TABLET, EXTENDED RELEASE ORAL
COMMUNITY

## 2024-08-01 ASSESSMENT — ENCOUNTER SYMPTOMS
WHEEZING: 0
PALPITATIONS: 0
DIZZINESS: 0
DYSURIA: 0
FATIGUE: 0
ABDOMINAL PAIN: 0
SHORTNESS OF BREATH: 0
CHEST TIGHTNESS: 0
JOINT SWELLING: 0
SORE THROAT: 0
WEAKNESS: 0
NAUSEA: 0
DIARRHEA: 0
ARTHRALGIAS: 0
CONFUSION: 0
CONSTIPATION: 0
ADENOPATHY: 0
UNEXPECTED WEIGHT CHANGE: 0
COUGH: 0
SLEEP DISTURBANCE: 0
CHILLS: 0
VOMITING: 0

## 2024-08-01 NOTE — PATIENT INSTRUCTIONS
Start inhalers  Fu with pulmonary  Have pfts   fu with urology  Was nice seeing you today.  Continue same medication.  Have lab work done before next appointment if labs were ordered today.  Fu in 2 month.  Call/ contact our office with any concerns.    If you have labs or test done and you can't see the report in your chart or you didn't here from us in 2 weeks after test/labs done , please, call our office for reports.  Please , do not assume that they were normal.

## 2024-08-01 NOTE — PROGRESS NOTES
Subjective   Kika Marcelino is a 58 y.o. female who presents for Establish Care.  NP- establish care ,patient states no PCP recently , found dr Palma  name on  system   [place] [facility] [place] for SOB, PNM, patient still coughing , seeing pul. Dr Suzie Chun , did see dr 2 times   Also while in hosp they did a CT test and  find out a tumor on  one of the kidneys , not sure witch one that needs to be removed, did see the  kidney dr in hosp,  patient has lito f/u with him next week  8.12.2024- dr silvia Nixon   /Hospital records, labs and test reviewed, discharge instruction and medication discussed with pt.  Had fu with pulmonary as out pt, repeat ct chest improved, still has symptoms.  Didn't take inhaler due to side effects  To see urology.    Hospital Course   Kika Marcelino is a 58 y.o. female presenting with chief complaint of shortness of breath and malaise.  Patient endorses a positive COVID-19 diagnosis 1 week from the day of admission.  States that she began to experience symptoms including cough, malaise, chest congestion with coworkers having tested positive as well as their direct family members.  She states that she took 2 home COVID-19 tests, both of which were noted to be positive.  She does endorse mild dyspnea but otherwise states she believes her course has been mild.  Patient's niece who is present at bedside provided corroborating information and patient does state that it was at the urging of her family that she sought evaluation today.  Initial evaluation in the emergency department noted that patient has been requiring 4 L of oxygen to maintain appropriate SpO2 levels.  Remainder of laboratory workup was grossly unremarkable.  CT of the chest was obtained which showed a large reverse halo sign airspace disease involving the lingula with differential including pulmonary infarct, fungal pneumonia, or atypical bacterial infections.  In addition, diffuse centrilobular  groundglass nodules and opacities within the upper lobe and patchy involvement in the lower lobes was noted, highly atypical for COVID-19.  Concerning for atypical etiology, such as mycobacterial.  In addition, incidental finding noted of a solid enhancing right renal mass measuring 3.3 x 3.1 cm only partially visualized.  Patient admitted for further management.      Hospital course:  Patient is a pleasant 58-year-old female with no significant past medical history who presented with acute onset shortness of breath, malaise, cough and congestion.  She was recently diagnosed with COVID a week ago.  She attended a concert.  Patient came in for acute hypoxic respiratory failure, she was placed on 4 L nasal cannula.  CT of the chest was obtained and showed concern for pneumonia or an atypical infection.  She was started on IV azithromycin and Rocephin.  Pulmonology and infectious disease were consulted.  Patient was also found to have an incidental right renal mass.  Urology was consulted, further imaging was done and there is concern of malignancy.  Patient will need to follow-up with urology as an outpatient for removal/nephrectomy.  Patient's oxygenation improved throughout her stay.  She was treated for pneumonia.  She was also given IV steroids.  She was weaned off oxygen 2 days ago, she has been stable on room air.  Repeat x-ray imaging today shows improvement of the pneumonia.  Pulmonology initially was considering doing a bronchoscopy however today she finally has showed improvement.  Patient will need to follow-up with pulmonology in 1 to 2 weeks for repeat imaging to ensure improvement/resolution of the pneumonia.  Her ambulatory pulse ox was stable, yesterday she was 95% on room air with ambulation.  Patient is stable for DC home with close follow-up.     Discharge instructions provided to the patient were:  Please see PCP in 1 week  Please see pulmonology dr torres in 1-2 weeks  Get repeat cxray or CT as  "outpatient with pulmonology to ensure resolution/improvement of pneumonia     Take augmentin twice a day for 7 days for your pneumonia  Take medrol dose pack/steroids as Rx  Albuterol inhaler is Rx as needed for wheezing or SOB  Review of Systems   Constitutional:  Negative for chills, fatigue and unexpected weight change.        Comment   HENT:  Negative for congestion, ear pain and sore throat.    Respiratory:  Negative for cough, chest tightness, shortness of breath and wheezing.    Cardiovascular:  Negative for palpitations and leg swelling.   Gastrointestinal:  Negative for abdominal pain, constipation, diarrhea, nausea and vomiting.   Genitourinary:  Negative for dysuria and urgency.   Musculoskeletal:  Negative for arthralgias and joint swelling.   Skin:  Negative for rash.   Neurological:  Negative for dizziness and weakness.   Hematological:  Negative for adenopathy.   Psychiatric/Behavioral:  Negative for confusion and sleep disturbance.        Objective   Physical Exam  Constitutional:       Appearance: Normal appearance.   HENT:      Head: Normocephalic and atraumatic.   Eyes:      Pupils: Pupils are equal, round, and reactive to light.   Cardiovascular:      Rate and Rhythm: Normal rate and regular rhythm.   Pulmonary:      Effort: Pulmonary effort is normal.      Breath sounds: Normal breath sounds.   Musculoskeletal:         General: Normal range of motion.      Cervical back: Normal range of motion and neck supple.   Skin:     General: Skin is warm.   Neurological:      General: No focal deficit present.      Mental Status: She is alert and oriented to person, place, and time.   Psychiatric:         Mood and Affect: Mood normal.         Behavior: Behavior normal.       /78 (BP Location: Left arm, Patient Position: Sitting)   Pulse 91   Temp 36.2 °C (97.1 °F)   Resp 18   Ht 1.651 m (5' 5\")   Wt 97.4 kg (214 lb 12.8 oz)   SpO2 97%   BMI 35.74 kg/m²   6 minute walk test completed in the " office patient pulse ox stayed 95-98%   === 07/23/24 ===    CT CHEST WO IV CONTRAST    - Impression -  1. Near-complete resolution of the previously described multifocal  areas of ground-glass tree-in-bud nodularity compared to prior exam  from 03/27/2024. Residual area of mild tree-in-bud nodularity  surround an area of consolidation within the anterior inferior aspect  of the right upper lobe favored to represent residual  infectious/inflammatory process.  2. Near-complete resolution of the large consolidative opacity within  the lingula with residual area of suspected postobstructive  atelectasis. Component of superimposed residual  infectious/inflammatory process not entirely excluded. Follow-up  chest CT in 3 months time is recommended to reassess.  3. Redemonstration of pericardial lymph node measuring up to 7 mm,  given recent findings of right renal neoplasm, attention on follow-up  recommended.  4. Partially visualized cholelithiasis without evidence to suggest  cholecystitis.  5. Additional findings as above.    I personally reviewed the images/study and I agree with the findings  as stated above by resident physician, Dr. Burton Murray.    MACRO:  none    Signed by: Ruddy Grissom 7/24/2024 10:36 PM  Dictation workstation:   [id number]  IMPRESSION:  Right renal lesion incompletely included in the field of view on CT  chest with contrast 27 June 2024 is confirmed an enhancing neoplasm  on today's CT kidney      It is new from CT abdomen and pelvis 20 April 2022      It does not contain fat or calcification      It is confined within the perirenal fat, without any suggestion of  invasion of the main vein      It is not particularly necrotic in appearance but does have  heterogeneous enhancement      No sign of active metastatic disease in the abdomen      The included lower chest is abnormal but unchanged from yesterday's  chest CT      MACRO:  None        Assessment/Plan   Problem List Items  Addressed This Visit       Primary atypical pneumonia    Acute hypoxemic respiratory failure (Multi) - Primary    Renal mass    Dyspnea    Ground glass opacity present on imaging of lung    COVID-19 long hauler manifesting chronic dyspnea    Hospital discharge follow-up    Calculus of gallbladder without cholecystitis without obstruction     No symptoms, discussed with pt biliary colic , elective sx and emergency .

## 2024-08-06 ENCOUNTER — APPOINTMENT (OUTPATIENT)
Dept: PULMONOLOGY | Facility: CLINIC | Age: 59
End: 2024-08-06
Payer: COMMERCIAL

## 2024-08-06 VITALS
SYSTOLIC BLOOD PRESSURE: 129 MMHG | DIASTOLIC BLOOD PRESSURE: 88 MMHG | BODY MASS INDEX: 36.02 KG/M2 | OXYGEN SATURATION: 94 % | RESPIRATION RATE: 18 BRPM | WEIGHT: 216.2 LBS | HEIGHT: 65 IN | HEART RATE: 85 BPM

## 2024-08-06 DIAGNOSIS — U09.9 COVID-19 LONG HAULER MANIFESTING CHRONIC DYSPNEA: ICD-10-CM

## 2024-08-06 DIAGNOSIS — R91.8 GROUND GLASS OPACITY PRESENT ON IMAGING OF LUNG: ICD-10-CM

## 2024-08-06 DIAGNOSIS — R06.09 COVID-19 LONG HAULER MANIFESTING CHRONIC DYSPNEA: ICD-10-CM

## 2024-08-06 DIAGNOSIS — R06.02 SHORTNESS OF BREATH: Primary | ICD-10-CM

## 2024-08-06 DIAGNOSIS — J18.9 PRIMARY ATYPICAL PNEUMONIA: ICD-10-CM

## 2024-08-06 PROCEDURE — 3008F BODY MASS INDEX DOCD: CPT | Performed by: NURSE PRACTITIONER

## 2024-08-06 PROCEDURE — 99214 OFFICE O/P EST MOD 30 MIN: CPT | Performed by: NURSE PRACTITIONER

## 2024-08-06 ASSESSMENT — ENCOUNTER SYMPTOMS
LOSS OF SENSATION IN FEET: 0
DEPRESSION: 0
OCCASIONAL FEELINGS OF UNSTEADINESS: 0

## 2024-08-06 ASSESSMENT — PATIENT HEALTH QUESTIONNAIRE - PHQ9
2. FEELING DOWN, DEPRESSED OR HOPELESS: NOT AT ALL
1. LITTLE INTEREST OR PLEASURE IN DOING THINGS: NOT AT ALL
SUM OF ALL RESPONSES TO PHQ9 QUESTIONS 1 AND 2: 0

## 2024-08-06 ASSESSMENT — COLUMBIA-SUICIDE SEVERITY RATING SCALE - C-SSRS
6. HAVE YOU EVER DONE ANYTHING, STARTED TO DO ANYTHING, OR PREPARED TO DO ANYTHING TO END YOUR LIFE?: NO
1. IN THE PAST MONTH, HAVE YOU WISHED YOU WERE DEAD OR WISHED YOU COULD GO TO SLEEP AND NOT WAKE UP?: NO
2. HAVE YOU ACTUALLY HAD ANY THOUGHTS OF KILLING YOURSELF?: NO

## 2024-08-06 NOTE — PATIENT INSTRUCTIONS
post COVID cough, dyspnea and acute respiratory failure -  - improving slowly - repeat CT 7//24/2024 and 6/27/2024 compared and improved  - repeat CT chest non-contrast 3 months  - obtain PFTS   - order ICS/LABA/LAMA  will give free sample of breztri 2 puffs twice a day rinse afterwards  - albuterol hfa 2 puffs or albuterol nebs every 4-6 hours as needed  - she takes dayquil at night               Thank you for visiting the Pulmonary clinic today!       Return to clinic after _4_weeks and after PFTs, CT scan complete  or sooner if needed   Suzie Chun CNP  My office number is [phone] -     Best way to get a hold of me is to call my office --> Please do not send me follow my health messages  Any test results will be discussed at next visit -- please make sure to make a follow up appt after testing.

## 2024-08-09 ENCOUNTER — APPOINTMENT (OUTPATIENT)
Dept: PRIMARY CARE | Facility: CLINIC | Age: 59
End: 2024-08-09
Payer: COMMERCIAL

## 2024-08-12 ENCOUNTER — APPOINTMENT (OUTPATIENT)
Dept: UROLOGY | Facility: CLINIC | Age: 59
End: 2024-08-12
Payer: COMMERCIAL

## 2024-08-12 VITALS
HEIGHT: 65 IN | DIASTOLIC BLOOD PRESSURE: 88 MMHG | TEMPERATURE: 98 F | BODY MASS INDEX: 35.89 KG/M2 | HEART RATE: 94 BPM | SYSTOLIC BLOOD PRESSURE: 145 MMHG | WEIGHT: 215.4 LBS

## 2024-08-12 DIAGNOSIS — N28.89 RENAL MASS: ICD-10-CM

## 2024-08-12 PROCEDURE — 99214 OFFICE O/P EST MOD 30 MIN: CPT | Performed by: UROLOGY

## 2024-08-12 PROCEDURE — 3008F BODY MASS INDEX DOCD: CPT | Performed by: UROLOGY

## 2024-08-12 PROCEDURE — G2211 COMPLEX E/M VISIT ADD ON: HCPCS | Performed by: UROLOGY

## 2024-08-12 NOTE — PROGRESS NOTES
PRIOR NOTES  58-year-old female seeing me regarding a renal mass  PMH: Otherwise healthy  Creatinine 0.81, calcium 9.2, LFTs normal  WBC 16.7  PSH: No prior abdominal surgery  Patient presented to the ER 6/29/2024 with atypical pneumonia and was found to have an incidental renal mass on his CT PE.  She was hypoxic and required supplemental oxygen.    CT A/P with and without contrast, kidney protocol-right exophytic interpolar mass abutting the liver anteriorly, enhancing.  Remainder of kidney radiographically normal.  Left kidney simple cyst.  Mass is new since 2022.  No evidence of disease in chest or remainder of abdomen/pelvis.    UPDATED SUBJECTIVE HISTORY  08/12/24 -patient is no longer requiring supplemental oxygen but does have some dyspnea on exertion, persistent cough    Past Medical History  She has no past medical history on file.    Surgical History  She has a past surgical history that includes Other surgical history.     Social History  She reports that she has been smoking cigarettes. She has never used smokeless tobacco. She reports current alcohol use. She reports current drug use. Drug: Marijuana.    Family History  Family History   Problem Relation Name Age of Onset    Breast cancer Mother      Heart attack Father      Lung cancer Sister      Cancer Mother's Sister      Cancer Mother's Brother          Allergies  Patient has no known allergies.    ROS: 12 system review was completed and is negative with the exception of those signs and symptoms noted in the history of present illness: A 12 system review was completed and is negative with the exception of those signs and symptoms noted in the history of present illness.     Exam:  General: in NAD, appears stated age  Head: normocephalic, atraumatic  Respiratory: normal effort, no use of accessory muscles  Cardiovascular: no edema noted  Skin: normal turgor, no rashes  Neurologic: grossly intact, oriented to person/place/time  Psychiatric: mode and  "affect appropriate     Last Recorded Vitals  Blood pressure 145/88, pulse 94, temperature 36.7 °C (98 °F), temperature source Temporal, height 1.651 m (5' 5\"), weight 97.7 kg (215 lb 6.4 oz).    Lab Results   Component Value Date    CREATININE 0.81 07/02/2024    HGB 13.7 07/03/2024         ASSESSMENT/PLAN:  # Right-sided small renal mass  -Enhancing, 3.3 cm of growth over 2 years  -We discussed roughly 80% chance that this represents a cancer of the kidney  -We discussed that her staging does not indicate there is any cancer outside of the kidney, however microscopic disease may exist  -We discussed the role of renal mass biopsy, given that she is young and otherwise healthy we recommended foregoing due to false-negative rate  -I recommended proceeding with robotic surgical partial nephrectomy, I described the surgery extensively as well as the postoperative expectations and risks  -She understands there is a risk of bleeding, injury to small bowel, colon, liver, gallbladder, urine leak, postoperative pain, postoperative UTI  -Plan for surgery in October  -Updated MRI in September, MRI kidney with and without contrast    Gerard Nixon MD    "

## 2024-08-13 DIAGNOSIS — N28.89 RIGHT RENAL MASS: Primary | ICD-10-CM

## 2024-08-13 DIAGNOSIS — R82.90 ABNORMAL URINALYSIS: ICD-10-CM

## 2024-08-13 DIAGNOSIS — R79.1 ABNORMAL COAGULATION PROFILE: ICD-10-CM

## 2024-08-13 RX ORDER — ACETAMINOPHEN 325 MG/1
975 TABLET ORAL ONCE
OUTPATIENT
Start: 2024-08-13 | End: 2024-08-13

## 2024-08-13 RX ORDER — SODIUM CHLORIDE 9 MG/ML
100 INJECTION, SOLUTION INTRAVENOUS CONTINUOUS
OUTPATIENT
Start: 2024-08-13

## 2024-08-16 ENCOUNTER — HOSPITAL ENCOUNTER (OUTPATIENT)
Dept: RESPIRATORY THERAPY | Facility: HOSPITAL | Age: 59
Discharge: HOME | End: 2024-08-16
Payer: COMMERCIAL

## 2024-08-16 DIAGNOSIS — R91.8 GROUND GLASS OPACITY PRESENT ON IMAGING OF LUNG: ICD-10-CM

## 2024-08-16 DIAGNOSIS — R06.00 DYSPNEA, UNSPECIFIED TYPE: ICD-10-CM

## 2024-08-16 DIAGNOSIS — R06.09 COVID-19 LONG HAULER MANIFESTING CHRONIC DYSPNEA: ICD-10-CM

## 2024-08-16 DIAGNOSIS — U09.9 COVID-19 LONG HAULER MANIFESTING CHRONIC DYSPNEA: ICD-10-CM

## 2024-08-16 LAB
MGC ASCENT PFT - FEV1 - POST: 2.07
MGC ASCENT PFT - FEV1 - POST: 2.07
MGC ASCENT PFT - FEV1 - PRE: 2.03
MGC ASCENT PFT - FEV1 - PRE: 2.03
MGC ASCENT PFT - FEV1 - PREDICTED: 2.5
MGC ASCENT PFT - FEV1 - PREDICTED: 2.5
MGC ASCENT PFT - FVC - POST: 2.63
MGC ASCENT PFT - FVC - POST: 2.63
MGC ASCENT PFT - FVC - PRE: 2.61
MGC ASCENT PFT - FVC - PRE: 2.61
MGC ASCENT PFT - FVC - PREDICTED: 3.14
MGC ASCENT PFT - FVC - PREDICTED: 3.14

## 2024-08-16 PROCEDURE — 94726 PLETHYSMOGRAPHY LUNG VOLUMES: CPT

## 2024-09-11 ENCOUNTER — APPOINTMENT (OUTPATIENT)
Dept: RADIOLOGY | Facility: HOSPITAL | Age: 59
End: 2024-09-11
Payer: COMMERCIAL

## 2024-09-23 ENCOUNTER — APPOINTMENT (OUTPATIENT)
Dept: PRIMARY CARE | Facility: CLINIC | Age: 59
End: 2024-09-23
Payer: COMMERCIAL

## 2024-09-25 ENCOUNTER — LAB (OUTPATIENT)
Dept: LAB | Facility: LAB | Age: 59
End: 2024-09-25
Payer: COMMERCIAL

## 2024-09-25 ENCOUNTER — PRE-ADMISSION TESTING (OUTPATIENT)
Dept: PREADMISSION TESTING | Facility: HOSPITAL | Age: 59
End: 2024-09-25
Payer: COMMERCIAL

## 2024-09-25 VITALS
WEIGHT: 215.17 LBS | TEMPERATURE: 99 F | OXYGEN SATURATION: 97 % | HEART RATE: 79 BPM | DIASTOLIC BLOOD PRESSURE: 80 MMHG | SYSTOLIC BLOOD PRESSURE: 158 MMHG | HEIGHT: 65 IN | BODY MASS INDEX: 35.85 KG/M2 | RESPIRATION RATE: 16 BRPM

## 2024-09-25 DIAGNOSIS — Z01.818 PREOP EXAMINATION: Primary | ICD-10-CM

## 2024-09-25 DIAGNOSIS — R79.1 ABNORMAL COAGULATION PROFILE: ICD-10-CM

## 2024-09-25 DIAGNOSIS — R82.90 ABNORMAL URINALYSIS: ICD-10-CM

## 2024-09-25 DIAGNOSIS — F17.200 CURRENT SMOKER: ICD-10-CM

## 2024-09-25 DIAGNOSIS — N28.89 RIGHT RENAL MASS: ICD-10-CM

## 2024-09-25 LAB
ABO GROUP (TYPE) IN BLOOD: NORMAL
ANION GAP SERPL CALC-SCNC: 13 MMOL/L (ref 10–20)
ANTIBODY SCREEN: NORMAL
APTT PPP: 34 SECONDS (ref 27–38)
BUN SERPL-MCNC: 15 MG/DL (ref 6–23)
CALCIUM SERPL-MCNC: 9.2 MG/DL (ref 8.6–10.3)
CHLORIDE SERPL-SCNC: 100 MMOL/L (ref 98–107)
CO2 SERPL-SCNC: 28 MMOL/L (ref 21–32)
CREAT SERPL-MCNC: 0.95 MG/DL (ref 0.5–1.05)
EGFRCR SERPLBLD CKD-EPI 2021: 70 ML/MIN/1.73M*2
ERYTHROCYTE [DISTWIDTH] IN BLOOD BY AUTOMATED COUNT: 13.4 % (ref 11.5–14.5)
GLUCOSE SERPL-MCNC: 82 MG/DL (ref 74–99)
HCT VFR BLD AUTO: 44.5 % (ref 36–46)
HGB BLD-MCNC: 14.5 G/DL (ref 12–16)
INR PPP: 1 (ref 0.9–1.1)
MCH RBC QN AUTO: 28.3 PG (ref 26–34)
MCHC RBC AUTO-ENTMCNC: 32.6 G/DL (ref 32–36)
MCV RBC AUTO: 87 FL (ref 80–100)
NRBC BLD-RTO: 0 /100 WBCS (ref 0–0)
PLATELET # BLD AUTO: 261 X10*3/UL (ref 150–450)
POTASSIUM SERPL-SCNC: 3.8 MMOL/L (ref 3.5–5.3)
PROTHROMBIN TIME: 11.1 SECONDS (ref 9.8–12.8)
RBC # BLD AUTO: 5.13 X10*6/UL (ref 4–5.2)
RH FACTOR (ANTIGEN D): NORMAL
SODIUM SERPL-SCNC: 137 MMOL/L (ref 136–145)
WBC # BLD AUTO: 7.7 X10*3/UL (ref 4.4–11.3)

## 2024-09-25 PROCEDURE — 85730 THROMBOPLASTIN TIME PARTIAL: CPT

## 2024-09-25 PROCEDURE — 85610 PROTHROMBIN TIME: CPT

## 2024-09-25 PROCEDURE — 80048 BASIC METABOLIC PNL TOTAL CA: CPT

## 2024-09-25 PROCEDURE — 85027 COMPLETE CBC AUTOMATED: CPT

## 2024-09-25 PROCEDURE — 99406 BEHAV CHNG SMOKING 3-10 MIN: CPT | Performed by: NURSE PRACTITIONER

## 2024-09-25 PROCEDURE — 86901 BLOOD TYPING SEROLOGIC RH(D): CPT

## 2024-09-25 PROCEDURE — 86900 BLOOD TYPING SEROLOGIC ABO: CPT

## 2024-09-25 PROCEDURE — 87086 URINE CULTURE/COLONY COUNT: CPT

## 2024-09-25 PROCEDURE — 36415 COLL VENOUS BLD VENIPUNCTURE: CPT

## 2024-09-25 PROCEDURE — 99202 OFFICE O/P NEW SF 15 MIN: CPT | Performed by: NURSE PRACTITIONER

## 2024-09-25 PROCEDURE — 86850 RBC ANTIBODY SCREEN: CPT

## 2024-09-25 ASSESSMENT — PAIN SCALES - GENERAL: PAINLEVEL_OUTOF10: 0 - NO PAIN

## 2024-09-25 ASSESSMENT — DUKE ACTIVITY SCORE INDEX (DASI)
CAN YOU DO LIGHT WORK AROUND THE HOUSE LIKE DUSTING OR WASHING DISHES: YES
DASI METS SCORE: 8.4
CAN YOU PARTICIPATE IN MODERATE RECREATIONAL ACTIVITIES LIKE GOLF, BOWLING, DANCING, DOUBLES TENNIS OR THROWING A BASEBALL OR FOOTBALL: YES
CAN YOU WALK A BLOCK OR TWO ON LEVEL GROUND: YES
CAN YOU HAVE SEXUAL RELATIONS: YES
CAN YOU RUN A SHORT DISTANCE: NO
CAN YOU DO MODERATE WORK AROUND THE HOUSE LIKE VACUUMING, SWEEPING FLOORS OR CARRYING GROCERIES: YES
CAN YOU PARTICIPATE IN STRENOUS SPORTS LIKE SWIMMING, SINGLES TENNIS, FOOTBALL, BASKETBALL, OR SKIING: YES
CAN YOU WALK INDOORS, SUCH AS AROUND YOUR HOUSE: YES
CAN YOU DO YARD WORK LIKE RAKING LEAVES, WEEDING OR PUSHING A MOWER: NO
TOTAL_SCORE: 45.7
CAN YOU DO HEAVY WORK AROUND THE HOUSE LIKE SCRUBBING FLOORS OR LIFTING AND MOVING HEAVY FURNITURE: YES
CAN YOU CLIMB A FLIGHT OF STAIRS OR WALK UP A HILL: YES
CAN YOU TAKE CARE OF YOURSELF (EAT, DRESS, BATHE, OR USE TOILET): YES

## 2024-09-25 ASSESSMENT — PAIN - FUNCTIONAL ASSESSMENT: PAIN_FUNCTIONAL_ASSESSMENT: 0-10

## 2024-09-25 ASSESSMENT — LIFESTYLE VARIABLES: SMOKING_STATUS: SMOKER

## 2024-09-25 ASSESSMENT — ACTIVITIES OF DAILY LIVING (ADL): ADL_SCORE: 0

## 2024-09-25 NOTE — PREPROCEDURE INSTRUCTIONS
Thank you for visiting Preadmission Testing at [facility]. If you have any changes to your health condition, please call the SURGEON's office to alert them and give them details of your symptoms.        Preoperative Brain Exercises    What are brain exercises?  A brain exercise is any activity that engages your thinking (cognitive) skills.    What types of activities are considered brain exercises?  Jigsaw puzzles, crossword puzzles, word jumble, memory games, word search, and many more.  Many can be found free online or on your phone via a mobile lito.    Why should I do brain exercises before my surgery?  More recent research has shown brain exercise before surgery can lower the risk of postoperative delirium (confusion) which can be especially important for older adults.  Patients who did brain exercises for 5 to 10 hours the days before surgery, cut their risk of postoperative delirium in half up to 1 week after surgery.      Preoperative Deep Breathing Exercises    Why it is important to do deep breathing exercises before my surgery?  Deep breathing exercises strengthen your breathing muscles.  This helps you to recover after your surgery and decreases the chance of breathing complications.    How are the deep breathing exercises done?  Sit straight with your back supported.  Breathe in deeply and slowly through your nose. Your lower rib cage should expand and your abdomen may move forward.  Hold that breath for 3 to 5 seconds.  Breathe out through pursed lips, slowly and completely.  Rest and repeat 10 times every hour while awake.  Rest longer if you become dizzy or lightheaded.      Patient and Family Education   Ways You Can Help Prevent Blood Clots     This handout explains some simple things you can do to help prevent blood clots.      Blood clots are blockages that can form in the body's veins. When a blood clot forms in your deep veins, it may be called a deep vein thrombosis, or DVT for short. Blood clots can  happen in any part of the body where blood flows, but they are most common in the arms and legs. If a piece of a blood clot breaks free and travels to the lungs, it is called a pulmonary embolus (PE). A PE can be a very serious problem.      Being in the hospital or having surgery can raise your chances of getting a blood clot because you may not be well enough to move around as much as you normally do.      Ways you can help prevent blood clots in the hospital         Wearing SCDs. SCDs stands for Sequential Compression Devices.   SCDs are special sleeves that wrap around your legs  They attach to a pump that fills them with air to gently squeeze your legs every few minutes.   This helps return the blood in your legs to your heart.   SCDs should only be taken off when walking or bathing.   SCDs may not be comfortable, but they can help save your life.               Wearing compression stockings - if your doctor orders them. These special snug fitting stockings gently squeeze your legs to help blood flow.       Walking. Walking helps move the blood in your legs.   If your doctor says it is ok, try walking the halls at least   5 times a day. Ask us to help you get up, so you don't fall.      Taking any blood thinning medicines your doctor orders.          ©[facility]; 3/23        Ways you can help prevent blood clots at home       Wearing compression stockings - if your doctor orders them. ? Walking - to help move the blood in your legs.       Taking any blood thinning medicines your doctor orders.      Signs of a blood clot or PE      Tell your doctor or nurse know right away if you have of the problems listed below.    If you are at home, seek medical care right away. Call 911 for chest pain or problems breathing.          Signs of a blood clot (DVT) - such as pain,  swelling, redness or warmth in your arm or leg      Signs of a pulmonary embolism (PE) - such as chest     pain or feeling short of breath                                  Patient and Family Education Quitting Smoking or Tobacco       Recognizing Dangerous Situations:   Alcohol use during the first month after quitting   Being around smoke or someone who smokes    Times situation routinely smoked   Triggers: car, breaks, coffee, when awakening, social events     Coping Skills:   Learning new ways to manage stress   Exercising   Relaxation breathing   Change routines   Distraction techniques     Websites:   Smoke-Free - offers free text messages and an lito to help you quit. Info includes eating and mood issues that may come with quitting. There is a Live Helpline to talk to an expert. Go to smokefree.gov     Become an Ex-Smoker - the free EX Plan is based on scientific research and useful advice from ex-smokers. It isn't just about quitting smoking. It's about re-learning life without cigarettes using a 3-step program. Go to becomeanex.OpenGov     Centers for Disease Control - offer many suggestions for helping you quit. Includes a Quit Guide and real-life stories. There are sections for specific groups such as LGBT, , different ethnic groups, and pregnant women. Go to cdc.gov/tobacco/campaign/tips     Other Resources:     [place] Tobacco Quit Line - call 1-800-QUIT-NOW or 1-958.784.2547.   [employer] 2-1-1 - to find local programs and resources. Call 211 or go to 211.OpenGov. [facility] Tobacco Cessation Program - call 211-058-7263.   American Lung Association - offers classes for quitting smoking. Some places may charge a fee. For a list of classes, go to lung.org or call 9-975-LUNG-USA.     Some things to think about:   The health benefits of quitting smoking can help most of the major parts of your body.   There is no safe amount of cigarette smoke. Quitting smoking can add years to your life.   When you quit, you'll also protect your loved ones from dangerous secondhand smoke.   Make a plan, join a support group, and talk to your physician to  assist in quitting smoking.     ©[facility], 11/20; PI-602 (6/22)

## 2024-09-25 NOTE — PREPROCEDURE INSTRUCTIONS
Medication List            Accurate as of September 25, 2024  1:43 PM. Always use your most recent med list.                albuterol 90 mcg/actuation inhaler  Commonly known as: ProAir HFA  Inhale 2 puffs every 6 hours if needed for wheezing or shortness of breath.  Medication Adjustments for Surgery: Take/Use as prescribed     budesonide-glycopyr-formoterol 160-9-4.8 mcg/actuation HFA aerosol inhaler  Commonly known as: BREZTRI  Inhale 2 puffs 2 times a day.  Medication Adjustments for Surgery: Take/Use as prescribed     ibuprofen 200 mg tablet  Additional Medication Adjustments for Surgery: Take last dose 7 days before surgery  Notes to patient: STOP all NSAIDS, over the counter medications, herbals and supplements for 7 days prior to surgery    May take tylenol if needed                            PRE-OPERATIVE INSTRUCTIONS    You will receive notification one business day prior to your procedure to confirm your arrival time. It is important that you answer your phone and/or check your messages during this time. If you do not hear from the surgery center by 5 pm. the day before your procedure, please call [phone].     Please enter the building through the Outpatient entrance and take the elevator off the lobby to the 2nd floor then check in at the Outpatient Surgery desk on the 2nd floor.    INSTRUCTIONS:  Talk to your surgeon for instructions if you should stop your aspirin, blood thinner, or diabetes medicines.  DO NOT take any multivitamins or over the counter supplements for 7-10 days before surgery.  If not being admitted, you must have an adult immediately available to drive you home after surgery. We also highly recommend you have someone stay with you for the entire day and night of your surgery.  For children having surgery, a parent or legal guardian must accompany them to the surgery center. If this is not possible, please call [phone] to make additional arrangements.  For adults who are  unable to consent or make medical decisions for themselves, a legal guardian or Power of  must accompany them to the surgery center. If this is not possible, please call [phone] to make additional arrangements.  Wear comfortable, loose fitting clothing.  All jewelry and piercings must be removed. If you are unable to remove an item or have a dermal piercing, please be sure to tell the nurse when you arrive for surgery.  Nail polish and make-up must be removed.  Avoid smoking or consuming alcohol for 24 hours before surgery.  To help prevent infection, please take a shower/bath and wash your hair the night before and/or morning of surgery (or follow other specific bathing instructions provided).    Preoperative Fasting Guidelines    Why must I stop eating and drinking near surgery time?  With sedation, food or liquid in your stomach can enter your lungs causing serious complications  Increases nausea and vomiting    When do I need to stop eating and drinking before my surgery?  Do not eat any solid food after midnight the night before your surgery/procedure unless otherwise instructed by your surgeon.   You may have up to 13.5 ounces of clear liquid until TWO hours before your instructed arrival time to the hospital.  This includes water, black tea/coffee, (no milk or cream) apple juice, and electrolyte drinks (Gatorade).   You may chew gum until TWO hours before your surgery/procedure      If applicable, notify your surgeons office immediately of any new skin changes that occur to the surgical limb.      If you have any questions or concerns, please call Pre-Admission Testing at [phone].     Home Preoperative Antibacterial Shower with Chlorhexidine gluconate (CHG)     What is a home preoperative antibacterial shower?  This shower is a way of cleaning the skin with a germ killing solution before surgery. The solution contains chlorhexidine gluconate, commonly known as CHG. CHG is a skin cleanser  with germ killing ability. Let your doctor know if you are allergic to chlorhexidine.    Why do I need to take a preoperative antibacterial shower?  Skin is not sterile. It is best to try to make your skin as free of germs as possible before surgery. Proper cleansing with a germ killing soap before surgery can lower the number of germs on your skin. This helps to reduce the risk of infection at the surgical site. Following the instructions listed below will help you prepare your skin for surgery.    How do I use the solution?  Begin using your CHG soap the night before and again the morning of your procedure.   Do not shave the day before or day of surgery.  Remove all jewelry until after surgery. Take off rings and take out all body-piercing jewelry.  Wash your face and hair with normal soap and shampoo before you use the CHG soap.  Apply the CHG solution to a clean wet washcloth. Move away from the water to avoid premature rinsing of the CHG soap as you are applying. Firmly lather your entire body from the neck down. Do not use CHG on your face, eyes, ears, or genitals.   Pay special attention to the area where your incisions will be located.  Do not scrub your skin too hard.  It is important to allow the CHG soap to sit on your skin for 3-5 minutes.  Rinse the solution off your body completely. Do not wash with your normal soap after the CHG soap solution.  Pat yourself dry with a clean, soft towel.  Do not apply powders, lotions or deodorants as these might block how the CHG soap works.   Dress in clean clothing.  Be sure to sleep with clean, freshly laundered sheets.  Be aware that CHG can cause stains on fabric. Rinse your washcloth and other linens that have contact with CHG completely. Use only non-chlorine detergents to launder the items used.

## 2024-09-25 NOTE — CPM/PAT H&P
[facility]/PAT Evaluation       Name: Kika Marcelino (Kika Marcelino)  /Age: 1965/58 y.o.     In-Person       Chief Complaint: Right renal mass    HPI    MM is a 57 yo female who developed COVID late 2024 which further developed into pneumonia, was hospitalized from  through 7/3/2024 requiring supplemental oxygen and IV antibiotics.  She was discharged home on room air and has been slowly recovering.  She followed up with PCP since then with a chest CT to follow-up as well-showing improvement.  During imaging obtained while hospitalized there was an incidental finding of a right renal mass.  She was then referred to urology who is concerned for malignancy-subsequently she is scheduled for a right partial robotic assisted nephrectomy. Presents to [facility] today for perioperative risk stratification and optimization.  Denies any flank pains, urgency/frequency, hematuria, and dysuria. Otherwise denies any recent illness, fever/chills, chest pains or shortness of breath.     Past Medical History:   Diagnosis Date    COVID-19     Current smoker     Pneumonia 2024    Renal mass     right    Vision loss      PCP: Dr. Palma  Pulm: [facility]    Chest CT 2024  IMPRESSION:  1. Near-complete resolution of the previously described multifocal  areas of ground-glass tree-in-bud nodularity compared to prior exam  from 2024. Residual area of mild tree-in-bud nodularity  surround an area of consolidation within the anterior inferior aspect  of the right upper lobe favored to represent residual  infectious/inflammatory process.  2. Near-complete resolution of the large consolidative opacity within  the lingula with residual area of suspected postobstructive  atelectasis. Component of superimposed residual  infectious/inflammatory process not entirely excluded. Follow-up  chest CT in 3 months time is recommended to reassess.  3. Redemonstration of pericardial lymph node measuring up to 7 mm,  given recent  findings of right renal neoplasm, attention on follow-up  recommended.  4. Partially visualized cholelithiasis without evidence to suggest  cholecystitis.  5. Additional findings as above.    Past Surgical History:   Procedure Laterality Date    D&C FIRST TRIMESTER / TX INCOMPLETE / MISSED / SEPTIC / INDUCED       OTHER SURGICAL HISTORY      DNC  done 2 y ago-       Patient  reports being sexually active and has had partner(s) who are male.    Family History   Problem Relation Name Age of Onset    Breast cancer Mother Gianna     Cancer Mother Gianna     Heart attack Father      Lung cancer Sister      Cancer Mother's Sister      Cancer Mother's Brother Otoniel     Cancer Sister Apolonia        No Known Allergies    Prior to Admission medications    Medication Sig Start Date End Date Taking? Authorizing Provider   albuterol (ProAir HFA) 90 mcg/actuation inhaler Inhale 2 puffs every 6 hours if needed for wheezing or shortness of breath. 24   Rashaad Palma MD   budesonide-glycopyr-formoterol (BREZTRI) 160-9-4.8 mcg/actuation HFA aerosol inhaler Inhale 2 puffs 2 times a day. 24   Rashaad Palma MD   ibuprofen (Motrin) capsule Take by mouth.    Historical Provider, MD CLEMENCIA MERAZ   Constitutional: Negative for fever, chills, or sweats   ENMT: Negative for nasal discharge, congestion, ear pain, mouth pain, throat pain   Respiratory: Negative for wheezing, shortness of breath   + occasional productive and dry cough s/p covid-pneumonia    Cardiac: Negative for chest pain, dyspnea on exertion, palpitations   Gastrointestinal: Negative for nausea, vomiting, diarrhea, constipation, abdominal pain  Genitourinary: Negative for dysuria, flank pain, frequency, hematuria   Musculoskeletal: Negative for decreased ROM, pain, swelling, weakness   Neurological: Negative for dizziness, confusion, headache  Psychiatric: Negative for mood changes   Skin: Negative for itching, rash, ulcer    Hematologic/Lymph:  Negative for bruising, easy bleeding  Allergic/Immunologic: Negative itching, sneezing, swelling      Physical Exam  Constitutional:       Appearance: Normal appearance.   HENT:      Head: Normocephalic.      Mouth/Throat:      Mouth: Mucous membranes are moist.   Eyes:      Extraocular Movements: Extraocular movements intact.   Cardiovascular:      Rate and Rhythm: Normal rate and regular rhythm.   Pulmonary:      Effort: Pulmonary effort is normal.      Breath sounds: Normal breath sounds.   Abdominal:      General: Abdomen is flat.      Palpations: Abdomen is soft.   Musculoskeletal:         General: Normal range of motion.      Cervical back: Normal range of motion.   Skin:     General: Skin is warm and dry.   Neurological:      General: No focal deficit present.      Mental Status: She is alert.   Psychiatric:         Mood and Affect: Mood normal.        PAT AIRWAY:   Airway:     Neck ROM::  Full  normal        Visit Vitals  /80   Pulse 79   Temp 37.2 °C (99 °F) (Tympanic)   Resp 16       DASI Risk Score    No data to display       Caprini DVT Assessment    No data to display       Modified Frailty Index    No data to display       CHADS2 Stroke Risk  Current as of 20 minutes ago        N/A 3 to 100%: High Risk   2 to < 3%: Medium Risk   0 to < 2%: Low Risk     Last Change: N/A          This score determines the patient's risk of having a stroke if the patient has atrial fibrillation.        This score is not applicable to this patient. Components are not calculated.          Revised Cardiac Risk Index      Flowsheet Row Most Recent Value   Revised Cardiac Risk Calculator 0          Apfel Simplified Score    No data to display       Risk Analysis Index Results This Encounter         9/25/2024  1337             ROYAL Cancer History: Patient does not indicate history of cancer    Total Risk Analysis Index Score Without Cancer: 16    Total Risk Analysis Index Score: 16          Stop Bang Score      Flowsheet  Row Most Recent Value   Is your neck circumference greater than 17 inches (Male) or 16 inches (Female)? 0            Assessment and Plan:     Pre-Op  57 yo female scheduled for right partial robotic assisted nephrectomy on 10/9/2024 with Dr. Nixon.  Blood work and urine culture ordered. Previous EKG from 2024 on file under cardiology, shows NSR. Otherwise no further orders indicated.     Cardiac  DASI Score: 45.7   MET Score: 8.4  RCRI 0, 3.9% risk for postoperative MACE    Pulmonary  -Primary atypical pneumonia, recently hospitalized this past summer late  into 2024; complicated with COVID-19, required supplemental oxygen briefly during hospitalization, no longer required  -Has albuterol inhaler for as needed use and daily Breztri inhaler prescribed, not using regularly  -CT of chest in 2024 was completed as follow up- improvement noted  -follows with pulmonology moving forward  -The patient is current tobacco user.  Advised to quit smoking to improve overall health and to decrease risks for anesthesia related complications as well as postoperative wound healing complications.  Smoking cessation educational handout provided to patient during appointment.  -Preoperative deep breathing educational handout provided to patient.  STOP BAN   points which is a low risk for moderate to severe CIERRA    GI  Calculus of gallbladder without cholecystitis without obstruction   Apfel: 1 points 21% risk for post operative N/V    /Renal  Renal mass, incidental finding on CT, high probability of malignancy, reason for surgery    Skin check: Patient was instructed to make surgeon aware of any skin changes/concerns prior to surgery.     Anesthesia:  Patient denies any anesthesia complications.     See risk scores as previously documented.

## 2024-09-27 LAB — BACTERIA UR CULT: NORMAL

## 2024-10-01 ENCOUNTER — HOSPITAL ENCOUNTER (OUTPATIENT)
Dept: RADIOLOGY | Facility: HOSPITAL | Age: 59
Discharge: HOME | End: 2024-10-01
Payer: COMMERCIAL

## 2024-10-01 DIAGNOSIS — N28.89 RENAL MASS: ICD-10-CM

## 2024-10-01 PROCEDURE — 2550000001 HC RX 255 CONTRASTS: Performed by: UROLOGY

## 2024-10-01 PROCEDURE — 74183 MRI ABD W/O CNTR FLWD CNTR: CPT

## 2024-10-01 PROCEDURE — 74183 MRI ABD W/O CNTR FLWD CNTR: CPT | Performed by: RADIOLOGY

## 2024-10-01 PROCEDURE — A9575 INJ GADOTERATE MEGLUMI 0.1ML: HCPCS | Performed by: UROLOGY

## 2024-10-01 RX ORDER — GADOTERATE MEGLUMINE 376.9 MG/ML
19 INJECTION INTRAVENOUS
Status: COMPLETED | OUTPATIENT
Start: 2024-10-01 | End: 2024-10-01

## 2024-10-09 ENCOUNTER — HOSPITAL ENCOUNTER (INPATIENT)
Facility: HOSPITAL | Age: 59
LOS: 1 days | Discharge: HOME | DRG: 658 | End: 2024-10-10
Attending: UROLOGY | Admitting: UROLOGY
Payer: COMMERCIAL

## 2024-10-09 ENCOUNTER — ANESTHESIA (OUTPATIENT)
Dept: OPERATING ROOM | Facility: HOSPITAL | Age: 59
End: 2024-10-09
Payer: COMMERCIAL

## 2024-10-09 ENCOUNTER — ANESTHESIA EVENT (OUTPATIENT)
Dept: OPERATING ROOM | Facility: HOSPITAL | Age: 59
End: 2024-10-09
Payer: COMMERCIAL

## 2024-10-09 DIAGNOSIS — N28.89 RIGHT RENAL MASS: Primary | ICD-10-CM

## 2024-10-09 PROCEDURE — 88307 TISSUE EXAM BY PATHOLOGIST: CPT | Performed by: STUDENT IN AN ORGANIZED HEALTH CARE EDUCATION/TRAINING PROGRAM

## 2024-10-09 PROCEDURE — 7100000002 HC RECOVERY ROOM TIME - EACH INCREMENTAL 1 MINUTE: Performed by: UROLOGY

## 2024-10-09 PROCEDURE — 2500000004 HC RX 250 GENERAL PHARMACY W/ HCPCS (ALT 636 FOR OP/ED): Performed by: ANESTHESIOLOGY

## 2024-10-09 PROCEDURE — 2500000004 HC RX 250 GENERAL PHARMACY W/ HCPCS (ALT 636 FOR OP/ED): Performed by: ANESTHESIOLOGIST ASSISTANT

## 2024-10-09 PROCEDURE — 0TB04ZZ EXCISION OF RIGHT KIDNEY, PERCUTANEOUS ENDOSCOPIC APPROACH: ICD-10-PCS | Performed by: UROLOGY

## 2024-10-09 PROCEDURE — 88307 TISSUE EXAM BY PATHOLOGIST: CPT | Mod: TC,STJLAB | Performed by: UROLOGY

## 2024-10-09 PROCEDURE — 2780000003 HC OR 278 NO HCPCS: Performed by: UROLOGY

## 2024-10-09 PROCEDURE — 1100000001 HC PRIVATE ROOM DAILY

## 2024-10-09 PROCEDURE — P9045 ALBUMIN (HUMAN), 5%, 250 ML: HCPCS | Mod: JZ | Performed by: ANESTHESIOLOGIST ASSISTANT

## 2024-10-09 PROCEDURE — 2500000005 HC RX 250 GENERAL PHARMACY W/O HCPCS: Performed by: ANESTHESIOLOGIST ASSISTANT

## 2024-10-09 PROCEDURE — 50543 LAPARO PARTIAL NEPHRECTOMY: CPT | Performed by: UROLOGY

## 2024-10-09 PROCEDURE — 2500000005 HC RX 250 GENERAL PHARMACY W/O HCPCS: Performed by: ANESTHESIOLOGY

## 2024-10-09 PROCEDURE — 2500000004 HC RX 250 GENERAL PHARMACY W/ HCPCS (ALT 636 FOR OP/ED): Mod: JZ | Performed by: UROLOGY

## 2024-10-09 PROCEDURE — 3600000018 HC OR TIME - INITIAL BASE CHARGE - PROCEDURE LEVEL SIX: Performed by: UROLOGY

## 2024-10-09 PROCEDURE — 88341 IMHCHEM/IMCYTCHM EA ADD ANTB: CPT | Performed by: STUDENT IN AN ORGANIZED HEALTH CARE EDUCATION/TRAINING PROGRAM

## 2024-10-09 PROCEDURE — 2720000007 HC OR 272 NO HCPCS: Performed by: UROLOGY

## 2024-10-09 PROCEDURE — 3700000001 HC GENERAL ANESTHESIA TIME - INITIAL BASE CHARGE: Performed by: UROLOGY

## 2024-10-09 PROCEDURE — 2500000005 HC RX 250 GENERAL PHARMACY W/O HCPCS: Performed by: STUDENT IN AN ORGANIZED HEALTH CARE EDUCATION/TRAINING PROGRAM

## 2024-10-09 PROCEDURE — 3700000002 HC GENERAL ANESTHESIA TIME - EACH INCREMENTAL 1 MINUTE: Performed by: UROLOGY

## 2024-10-09 PROCEDURE — 3600000017 HC OR TIME - EACH INCREMENTAL 1 MINUTE - PROCEDURE LEVEL SIX: Performed by: UROLOGY

## 2024-10-09 PROCEDURE — A50543 PR LAP,PARTIAL NEPHRECTOMY: Performed by: ANESTHESIOLOGY

## 2024-10-09 PROCEDURE — 76998 US GUIDE INTRAOP: CPT | Performed by: UROLOGY

## 2024-10-09 PROCEDURE — 2500000004 HC RX 250 GENERAL PHARMACY W/ HCPCS (ALT 636 FOR OP/ED): Performed by: STUDENT IN AN ORGANIZED HEALTH CARE EDUCATION/TRAINING PROGRAM

## 2024-10-09 PROCEDURE — 2500000001 HC RX 250 WO HCPCS SELF ADMINISTERED DRUGS (ALT 637 FOR MEDICARE OP): Performed by: STUDENT IN AN ORGANIZED HEALTH CARE EDUCATION/TRAINING PROGRAM

## 2024-10-09 PROCEDURE — 7100000001 HC RECOVERY ROOM TIME - INITIAL BASE CHARGE: Performed by: UROLOGY

## 2024-10-09 PROCEDURE — 88342 IMHCHEM/IMCYTCHM 1ST ANTB: CPT | Performed by: STUDENT IN AN ORGANIZED HEALTH CARE EDUCATION/TRAINING PROGRAM

## 2024-10-09 PROCEDURE — 8E0W4CZ ROBOTIC ASSISTED PROCEDURE OF TRUNK REGION, PERCUTANEOUS ENDOSCOPIC APPROACH: ICD-10-PCS | Performed by: UROLOGY

## 2024-10-09 PROCEDURE — A50543 PR LAP,PARTIAL NEPHRECTOMY: Performed by: ANESTHESIOLOGIST ASSISTANT

## 2024-10-09 PROCEDURE — 36620 INSERTION CATHETER ARTERY: CPT | Performed by: ANESTHESIOLOGY

## 2024-10-09 RX ORDER — SODIUM CHLORIDE, SODIUM LACTATE, POTASSIUM CHLORIDE, CALCIUM CHLORIDE 600; 310; 30; 20 MG/100ML; MG/100ML; MG/100ML; MG/100ML
100 INJECTION, SOLUTION INTRAVENOUS CONTINUOUS
Status: DISCONTINUED | OUTPATIENT
Start: 2024-10-09 | End: 2024-10-09 | Stop reason: HOSPADM

## 2024-10-09 RX ORDER — ROCURONIUM BROMIDE 50 MG/5 ML
SYRINGE (ML) INTRAVENOUS AS NEEDED
Status: DISCONTINUED | OUTPATIENT
Start: 2024-10-09 | End: 2024-10-09

## 2024-10-09 RX ORDER — SODIUM CHLORIDE, SODIUM LACTATE, POTASSIUM CHLORIDE, CALCIUM CHLORIDE 600; 310; 30; 20 MG/100ML; MG/100ML; MG/100ML; MG/100ML
100 INJECTION, SOLUTION INTRAVENOUS CONTINUOUS
Status: DISCONTINUED | OUTPATIENT
Start: 2024-10-09 | End: 2024-10-09

## 2024-10-09 RX ORDER — ONDANSETRON HYDROCHLORIDE 2 MG/ML
INJECTION, SOLUTION INTRAVENOUS AS NEEDED
Status: DISCONTINUED | OUTPATIENT
Start: 2024-10-09 | End: 2024-10-09

## 2024-10-09 RX ORDER — OXYCODONE HYDROCHLORIDE 5 MG/1
5 TABLET ORAL EVERY 4 HOURS PRN
Status: DISCONTINUED | OUTPATIENT
Start: 2024-10-09 | End: 2024-10-09 | Stop reason: HOSPADM

## 2024-10-09 RX ORDER — OXYCODONE HYDROCHLORIDE 5 MG/1
5 TABLET ORAL EVERY 6 HOURS PRN
Status: DISCONTINUED | OUTPATIENT
Start: 2024-10-09 | End: 2024-10-10 | Stop reason: HOSPADM

## 2024-10-09 RX ORDER — ONDANSETRON HYDROCHLORIDE 2 MG/ML
4 INJECTION, SOLUTION INTRAVENOUS EVERY 8 HOURS PRN
Status: DISCONTINUED | OUTPATIENT
Start: 2024-10-09 | End: 2024-10-10 | Stop reason: HOSPADM

## 2024-10-09 RX ORDER — ALBUMIN HUMAN 50 G/1000ML
SOLUTION INTRAVENOUS AS NEEDED
Status: DISCONTINUED | OUTPATIENT
Start: 2024-10-09 | End: 2024-10-09

## 2024-10-09 RX ORDER — PROPOFOL 10 MG/ML
INJECTION, EMULSION INTRAVENOUS AS NEEDED
Status: DISCONTINUED | OUTPATIENT
Start: 2024-10-09 | End: 2024-10-09

## 2024-10-09 RX ORDER — LABETALOL HYDROCHLORIDE 5 MG/ML
INJECTION, SOLUTION INTRAVENOUS AS NEEDED
Status: DISCONTINUED | OUTPATIENT
Start: 2024-10-09 | End: 2024-10-09

## 2024-10-09 RX ORDER — HYDROMORPHONE HYDROCHLORIDE 0.2 MG/ML
0.2 INJECTION INTRAMUSCULAR; INTRAVENOUS; SUBCUTANEOUS EVERY 2 HOUR PRN
Status: DISCONTINUED | OUTPATIENT
Start: 2024-10-09 | End: 2024-10-10 | Stop reason: HOSPADM

## 2024-10-09 RX ORDER — BUPIVACAINE HYDROCHLORIDE 5 MG/ML
INJECTION, SOLUTION EPIDURAL; INTRACAUDAL AS NEEDED
Status: DISCONTINUED | OUTPATIENT
Start: 2024-10-09 | End: 2024-10-09 | Stop reason: HOSPADM

## 2024-10-09 RX ORDER — FENTANYL CITRATE 50 UG/ML
INJECTION, SOLUTION INTRAMUSCULAR; INTRAVENOUS AS NEEDED
Status: DISCONTINUED | OUTPATIENT
Start: 2024-10-09 | End: 2024-10-09

## 2024-10-09 RX ORDER — NITROGLYCERIN 20 MG/100ML
INJECTION INTRAVENOUS AS NEEDED
Status: DISCONTINUED | OUTPATIENT
Start: 2024-10-09 | End: 2024-10-09

## 2024-10-09 RX ORDER — SODIUM CHLORIDE, SODIUM LACTATE, POTASSIUM CHLORIDE, CALCIUM CHLORIDE 600; 310; 30; 20 MG/100ML; MG/100ML; MG/100ML; MG/100ML
100 INJECTION, SOLUTION INTRAVENOUS ONCE
Status: COMPLETED | OUTPATIENT
Start: 2024-10-09 | End: 2024-10-09

## 2024-10-09 RX ORDER — OXYCODONE HYDROCHLORIDE 10 MG/1
10 TABLET ORAL EVERY 4 HOURS PRN
Status: DISCONTINUED | OUTPATIENT
Start: 2024-10-09 | End: 2024-10-10 | Stop reason: HOSPADM

## 2024-10-09 RX ORDER — LIDOCAINE HYDROCHLORIDE 20 MG/ML
INJECTION, SOLUTION EPIDURAL; INFILTRATION; INTRACAUDAL; PERINEURAL AS NEEDED
Status: DISCONTINUED | OUTPATIENT
Start: 2024-10-09 | End: 2024-10-09

## 2024-10-09 RX ORDER — LABETALOL HYDROCHLORIDE 5 MG/ML
5 INJECTION, SOLUTION INTRAVENOUS ONCE AS NEEDED
Status: DISCONTINUED | OUTPATIENT
Start: 2024-10-09 | End: 2024-10-09 | Stop reason: HOSPADM

## 2024-10-09 RX ORDER — ALBUTEROL SULFATE 0.83 MG/ML
2.5 SOLUTION RESPIRATORY (INHALATION) ONCE AS NEEDED
Status: DISCONTINUED | OUTPATIENT
Start: 2024-10-09 | End: 2024-10-09 | Stop reason: HOSPADM

## 2024-10-09 RX ORDER — ONDANSETRON HYDROCHLORIDE 2 MG/ML
4 INJECTION, SOLUTION INTRAVENOUS ONCE AS NEEDED
Status: COMPLETED | OUTPATIENT
Start: 2024-10-09 | End: 2024-10-09

## 2024-10-09 RX ORDER — LIDOCAINE HYDROCHLORIDE 10 MG/ML
0.1 INJECTION, SOLUTION INFILTRATION; PERINEURAL ONCE
Status: DISCONTINUED | OUTPATIENT
Start: 2024-10-09 | End: 2024-10-09 | Stop reason: HOSPADM

## 2024-10-09 RX ORDER — ONDANSETRON 4 MG/1
4 TABLET, ORALLY DISINTEGRATING ORAL EVERY 8 HOURS PRN
Status: DISCONTINUED | OUTPATIENT
Start: 2024-10-09 | End: 2024-10-10 | Stop reason: HOSPADM

## 2024-10-09 RX ORDER — MIDAZOLAM HYDROCHLORIDE 1 MG/ML
INJECTION, SOLUTION INTRAMUSCULAR; INTRAVENOUS AS NEEDED
Status: DISCONTINUED | OUTPATIENT
Start: 2024-10-09 | End: 2024-10-09

## 2024-10-09 RX ORDER — HEPARIN SODIUM 5000 [USP'U]/ML
5000 INJECTION, SOLUTION INTRAVENOUS; SUBCUTANEOUS EVERY 8 HOURS
Status: CANCELLED | OUTPATIENT
Start: 2024-10-09

## 2024-10-09 RX ORDER — ACETAMINOPHEN 325 MG/1
975 TABLET ORAL ONCE
Status: DISCONTINUED | OUTPATIENT
Start: 2024-10-09 | End: 2024-10-09 | Stop reason: HOSPADM

## 2024-10-09 RX ORDER — POLYETHYLENE GLYCOL 3350 17 G/17G
17 POWDER, FOR SOLUTION ORAL DAILY
Status: DISCONTINUED | OUTPATIENT
Start: 2024-10-09 | End: 2024-10-10 | Stop reason: HOSPADM

## 2024-10-09 RX ORDER — OXYCODONE AND ACETAMINOPHEN 5; 325 MG/1; MG/1
1 TABLET ORAL EVERY 4 HOURS PRN
Status: DISCONTINUED | OUTPATIENT
Start: 2024-10-09 | End: 2024-10-09 | Stop reason: HOSPADM

## 2024-10-09 RX ORDER — CEFAZOLIN SODIUM 2 G/100ML
2 INJECTION, SOLUTION INTRAVENOUS ONCE
Status: COMPLETED | OUTPATIENT
Start: 2024-10-09 | End: 2024-10-09

## 2024-10-09 RX ORDER — HYDRALAZINE HYDROCHLORIDE 20 MG/ML
5 INJECTION INTRAMUSCULAR; INTRAVENOUS EVERY 30 MIN PRN
Status: COMPLETED | OUTPATIENT
Start: 2024-10-09 | End: 2024-10-09

## 2024-10-09 RX ORDER — LIDOCAINE 560 MG/1
1 PATCH PERCUTANEOUS; TOPICAL; TRANSDERMAL DAILY
Status: DISCONTINUED | OUTPATIENT
Start: 2024-10-09 | End: 2024-10-10 | Stop reason: HOSPADM

## 2024-10-09 RX ORDER — ALBUTEROL SULFATE 90 UG/1
2 INHALANT RESPIRATORY (INHALATION) EVERY 6 HOURS PRN
Status: DISCONTINUED | OUTPATIENT
Start: 2024-10-09 | End: 2024-10-09

## 2024-10-09 RX ORDER — GLYCOPYRROLATE 0.2 MG/ML
INJECTION INTRAMUSCULAR; INTRAVENOUS AS NEEDED
Status: DISCONTINUED | OUTPATIENT
Start: 2024-10-09 | End: 2024-10-09

## 2024-10-09 RX ORDER — DIPHENHYDRAMINE HYDROCHLORIDE 50 MG/ML
12.5 INJECTION INTRAMUSCULAR; INTRAVENOUS ONCE AS NEEDED
Status: DISCONTINUED | OUTPATIENT
Start: 2024-10-09 | End: 2024-10-09 | Stop reason: HOSPADM

## 2024-10-09 RX ORDER — ALBUTEROL SULFATE 0.83 MG/ML
2.5 SOLUTION RESPIRATORY (INHALATION) EVERY 6 HOURS PRN
Status: DISCONTINUED | OUTPATIENT
Start: 2024-10-09 | End: 2024-10-10 | Stop reason: HOSPADM

## 2024-10-09 RX ORDER — HYDROMORPHONE HYDROCHLORIDE 1 MG/ML
INJECTION, SOLUTION INTRAMUSCULAR; INTRAVENOUS; SUBCUTANEOUS AS NEEDED
Status: DISCONTINUED | OUTPATIENT
Start: 2024-10-09 | End: 2024-10-09

## 2024-10-09 RX ORDER — HYDROMORPHONE HYDROCHLORIDE 0.2 MG/ML
0.2 INJECTION INTRAMUSCULAR; INTRAVENOUS; SUBCUTANEOUS EVERY 5 MIN PRN
Status: DISCONTINUED | OUTPATIENT
Start: 2024-10-09 | End: 2024-10-09 | Stop reason: HOSPADM

## 2024-10-09 RX ORDER — PHENYLEPHRINE HCL IN 0.9% NACL 1 MG/10 ML
SYRINGE (ML) INTRAVENOUS AS NEEDED
Status: DISCONTINUED | OUTPATIENT
Start: 2024-10-09 | End: 2024-10-09

## 2024-10-09 RX ORDER — SODIUM CHLORIDE 9 MG/ML
100 INJECTION, SOLUTION INTRAVENOUS CONTINUOUS
Status: DISCONTINUED | OUTPATIENT
Start: 2024-10-09 | End: 2024-10-10

## 2024-10-09 RX ORDER — ACETAMINOPHEN 325 MG/1
975 TABLET ORAL EVERY 6 HOURS
Status: DISCONTINUED | OUTPATIENT
Start: 2024-10-09 | End: 2024-10-10 | Stop reason: HOSPADM

## 2024-10-09 SDOH — ECONOMIC STABILITY: HOUSING INSECURITY: IN THE LAST 12 MONTHS, WAS THERE A TIME WHEN YOU WERE NOT ABLE TO PAY THE MORTGAGE OR RENT ON TIME?: NO

## 2024-10-09 SDOH — ECONOMIC STABILITY: HOUSING INSECURITY: AT ANY TIME IN THE PAST 12 MONTHS, WERE YOU HOMELESS OR LIVING IN A SHELTER (INCLUDING NOW)?: NO

## 2024-10-09 SDOH — SOCIAL STABILITY: SOCIAL INSECURITY: WITHIN THE LAST YEAR, HAVE YOU BEEN HUMILIATED OR EMOTIONALLY ABUSED IN OTHER WAYS BY YOUR PARTNER OR EX-PARTNER?: NO

## 2024-10-09 SDOH — ECONOMIC STABILITY: INCOME INSECURITY: IN THE LAST 12 MONTHS, WAS THERE A TIME WHEN YOU WERE NOT ABLE TO PAY THE MORTGAGE OR RENT ON TIME?: NO

## 2024-10-09 SDOH — SOCIAL STABILITY: SOCIAL INSECURITY: DO YOU FEEL UNSAFE GOING BACK TO THE PLACE WHERE YOU ARE LIVING?: NO

## 2024-10-09 SDOH — SOCIAL STABILITY: SOCIAL INSECURITY
WITHIN THE LAST YEAR, HAVE TO BEEN RAPED OR FORCED TO HAVE ANY KIND OF SEXUAL ACTIVITY BY YOUR PARTNER OR EX-PARTNER?: NO

## 2024-10-09 SDOH — ECONOMIC STABILITY: FOOD INSECURITY: HOW HARD IS IT FOR YOU TO PAY FOR THE VERY BASICS LIKE FOOD, HOUSING, MEDICAL CARE, AND HEATING?: NOT VERY HARD

## 2024-10-09 SDOH — SOCIAL STABILITY: SOCIAL INSECURITY: WITHIN THE LAST YEAR, HAVE YOU BEEN AFRAID OF YOUR PARTNER OR EX-PARTNER?: NO

## 2024-10-09 SDOH — HEALTH STABILITY: MENTAL HEALTH: CURRENT SMOKER: 0

## 2024-10-09 SDOH — SOCIAL STABILITY: SOCIAL INSECURITY: ARE THERE ANY APPARENT SIGNS OF INJURIES/BEHAVIORS THAT COULD BE RELATED TO ABUSE/NEGLECT?: NO

## 2024-10-09 SDOH — SOCIAL STABILITY: SOCIAL INSECURITY: ARE YOU OR HAVE YOU BEEN THREATENED OR ABUSED PHYSICALLY, EMOTIONALLY, OR SEXUALLY BY ANYONE?: NO

## 2024-10-09 SDOH — ECONOMIC STABILITY: INCOME INSECURITY
IN THE PAST 12 MONTHS, HAS THE ELECTRIC, GAS, OIL, OR WATER COMPANY THREATENED TO SHUT OFF SERVICE IN YOUR HOME?: PATIENT DECLINED

## 2024-10-09 SDOH — SOCIAL STABILITY: SOCIAL INSECURITY: DOES ANYONE TRY TO KEEP YOU FROM HAVING/CONTACTING OTHER FRIENDS OR DOING THINGS OUTSIDE YOUR HOME?: NO

## 2024-10-09 SDOH — ECONOMIC STABILITY: INCOME INSECURITY: HOW HARD IS IT FOR YOU TO PAY FOR THE VERY BASICS LIKE FOOD, HOUSING, MEDICAL CARE, AND HEATING?: NOT VERY HARD

## 2024-10-09 SDOH — SOCIAL STABILITY: SOCIAL INSECURITY
WITHIN THE LAST YEAR, HAVE YOU BEEN KICKED, HIT, SLAPPED, OR OTHERWISE PHYSICALLY HURT BY YOUR PARTNER OR EX-PARTNER?: NO

## 2024-10-09 SDOH — ECONOMIC STABILITY: FOOD INSECURITY: WITHIN THE PAST 12 MONTHS, THE FOOD YOU BOUGHT JUST DIDN'T LAST AND YOU DIDN'T HAVE MONEY TO GET MORE.: PATIENT DECLINED

## 2024-10-09 SDOH — SOCIAL STABILITY: SOCIAL INSECURITY: DO YOU FEEL ANYONE HAS EXPLOITED OR TAKEN ADVANTAGE OF YOU FINANCIALLY OR OF YOUR PERSONAL PROPERTY?: NO

## 2024-10-09 SDOH — HEALTH STABILITY: MENTAL HEALTH
STRESS IS WHEN SOMEONE FEELS TENSE, NERVOUS, ANXIOUS, OR CAN'T SLEEP AT NIGHT BECAUSE THEIR MIND IS TROUBLED. HOW STRESSED ARE YOU?: PATIENT DECLINED

## 2024-10-09 SDOH — HEALTH STABILITY: MENTAL HEALTH
DO YOU FEEL STRESS - TENSE, RESTLESS, NERVOUS, OR ANXIOUS, OR UNABLE TO SLEEP AT NIGHT BECAUSE YOUR MIND IS TROUBLED ALL THE TIME - THESE DAYS?: PATIENT DECLINED

## 2024-10-09 SDOH — ECONOMIC STABILITY: TRANSPORTATION INSECURITY: IN THE PAST 12 MONTHS, HAS LACK OF TRANSPORTATION KEPT YOU FROM MEDICAL APPOINTMENTS OR FROM GETTING MEDICATIONS?: NO

## 2024-10-09 SDOH — SOCIAL STABILITY: SOCIAL INSECURITY: WERE YOU ABLE TO COMPLETE ALL THE BEHAVIORAL HEALTH SCREENINGS?: YES

## 2024-10-09 SDOH — SOCIAL STABILITY: SOCIAL INSECURITY: HAVE YOU HAD THOUGHTS OF HARMING ANYONE ELSE?: NO

## 2024-10-09 SDOH — ECONOMIC STABILITY: HOUSING INSECURITY: IN THE PAST 12 MONTHS, HOW MANY TIMES HAVE YOU MOVED WHERE YOU WERE LIVING?: 1

## 2024-10-09 SDOH — SOCIAL STABILITY: SOCIAL INSECURITY: ABUSE: ADULT

## 2024-10-09 SDOH — ECONOMIC STABILITY: FOOD INSECURITY
WITHIN THE PAST 12 MONTHS, YOU WORRIED THAT YOUR FOOD WOULD RUN OUT BEFORE YOU GOT THE MONEY TO BUY MORE.: PATIENT DECLINED

## 2024-10-09 SDOH — SOCIAL STABILITY: SOCIAL INSECURITY
WITHIN THE LAST YEAR, HAVE YOU BEEN RAPED OR FORCED TO HAVE ANY KIND OF SEXUAL ACTIVITY BY YOUR PARTNER OR EX-PARTNER?: NO

## 2024-10-09 SDOH — SOCIAL STABILITY: SOCIAL INSECURITY: HAVE YOU HAD ANY THOUGHTS OF HARMING ANYONE ELSE?: NO

## 2024-10-09 SDOH — SOCIAL STABILITY: SOCIAL INSECURITY: HAS ANYONE EVER THREATENED TO HURT YOUR FAMILY OR YOUR PETS?: NO

## 2024-10-09 SDOH — ECONOMIC STABILITY: FOOD INSECURITY: WITHIN THE PAST 12 MONTHS, YOU WORRIED THAT YOUR FOOD WOULD RUN OUT BEFORE YOU GOT MONEY TO BUY MORE.: PATIENT DECLINED

## 2024-10-09 SDOH — ECONOMIC STABILITY: INCOME INSECURITY
IN THE PAST 12 MONTHS HAS THE ELECTRIC, GAS, OIL, OR WATER COMPANY THREATENED TO SHUT OFF SERVICES IN YOUR HOME?: PATIENT DECLINED

## 2024-10-09 ASSESSMENT — ACTIVITIES OF DAILY LIVING (ADL)
HEARING - RIGHT EAR: FUNCTIONAL
DRESSING YOURSELF: INDEPENDENT
BATHING: INDEPENDENT
LACK_OF_TRANSPORTATION: NO
TOILETING: INDEPENDENT
ADEQUATE_TO_COMPLETE_ADL: YES
GROOMING: INDEPENDENT
JUDGMENT_ADEQUATE_SAFELY_COMPLETE_DAILY_ACTIVITIES: YES
HEARING - LEFT EAR: FUNCTIONAL
WALKS IN HOME: INDEPENDENT
PATIENT'S MEMORY ADEQUATE TO SAFELY COMPLETE DAILY ACTIVITIES?: YES
LACK_OF_TRANSPORTATION: NO
FEEDING YOURSELF: INDEPENDENT

## 2024-10-09 ASSESSMENT — PAIN - FUNCTIONAL ASSESSMENT
PAIN_FUNCTIONAL_ASSESSMENT: 0-10
PAIN_FUNCTIONAL_ASSESSMENT: UNABLE TO SELF-REPORT
PAIN_FUNCTIONAL_ASSESSMENT: UNABLE TO SELF-REPORT
PAIN_FUNCTIONAL_ASSESSMENT: 0-10

## 2024-10-09 ASSESSMENT — COGNITIVE AND FUNCTIONAL STATUS - GENERAL
DRESSING REGULAR UPPER BODY CLOTHING: A LITTLE
CLIMB 3 TO 5 STEPS WITH RAILING: A LITTLE
MOVING FROM LYING ON BACK TO SITTING ON SIDE OF FLAT BED WITH BEDRAILS: A LITTLE
TURNING FROM BACK TO SIDE WHILE IN FLAT BAD: A LITTLE
MOVING TO AND FROM BED TO CHAIR: A LITTLE
MOVING FROM LYING ON BACK TO SITTING ON SIDE OF FLAT BED WITH BEDRAILS: A LITTLE
EATING MEALS: A LITTLE
STANDING UP FROM CHAIR USING ARMS: A LITTLE
DRESSING REGULAR LOWER BODY CLOTHING: A LITTLE
MOBILITY SCORE: 18
DAILY ACTIVITIY SCORE: 19
TOILETING: A LITTLE
DRESSING REGULAR UPPER BODY CLOTHING: A LITTLE
WALKING IN HOSPITAL ROOM: A LITTLE
CLIMB 3 TO 5 STEPS WITH RAILING: A LITTLE
DRESSING REGULAR LOWER BODY CLOTHING: A LITTLE
HELP NEEDED FOR BATHING: A LITTLE
MOBILITY SCORE: 18
HELP NEEDED FOR BATHING: A LITTLE
WALKING IN HOSPITAL ROOM: A LITTLE
TURNING FROM BACK TO SIDE WHILE IN FLAT BAD: A LITTLE
PATIENT BASELINE BEDBOUND: NO
DAILY ACTIVITIY SCORE: 18
TOILETING: A LITTLE
PERSONAL GROOMING: A LITTLE
STANDING UP FROM CHAIR USING ARMS: A LITTLE
PERSONAL GROOMING: A LITTLE
MOVING TO AND FROM BED TO CHAIR: A LITTLE

## 2024-10-09 ASSESSMENT — LIFESTYLE VARIABLES
SKIP TO QUESTIONS 9-10: 1
HOW MANY STANDARD DRINKS CONTAINING ALCOHOL DO YOU HAVE ON A TYPICAL DAY: 1 OR 2
HOW OFTEN DO YOU HAVE A DRINK CONTAINING ALCOHOL: MONTHLY OR LESS
AUDIT-C TOTAL SCORE: 1
HOW OFTEN DO YOU HAVE 6 OR MORE DRINKS ON ONE OCCASION: NEVER
AUDIT-C TOTAL SCORE: 1

## 2024-10-09 ASSESSMENT — PAIN SCALES - GENERAL
PAINLEVEL_OUTOF10: 0 - NO PAIN
PAINLEVEL_OUTOF10: 4
PAINLEVEL_OUTOF10: 5 - MODERATE PAIN
PAINLEVEL_OUTOF10: 4
PAINLEVEL_OUTOF10: 7
PAINLEVEL_OUTOF10: 0 - NO PAIN
PAINLEVEL_OUTOF10: 0 - NO PAIN
PAINLEVEL_OUTOF10: 4
PAINLEVEL_OUTOF10: 2
PAINLEVEL_OUTOF10: 0 - NO PAIN
PAINLEVEL_OUTOF10: 5 - MODERATE PAIN

## 2024-10-09 ASSESSMENT — PATIENT HEALTH QUESTIONNAIRE - PHQ9
SUM OF ALL RESPONSES TO PHQ9 QUESTIONS 1 & 2: 0
1. LITTLE INTEREST OR PLEASURE IN DOING THINGS: NOT AT ALL
2. FEELING DOWN, DEPRESSED OR HOPELESS: NOT AT ALL

## 2024-10-09 ASSESSMENT — PAIN DESCRIPTION - ORIENTATION: ORIENTATION: RIGHT

## 2024-10-09 NOTE — ANESTHESIA PROCEDURE NOTES
Airway  Date/Time: 10/9/2024 11:38 AM  Urgency: elective    Airway not difficult    Staffing  Performed: JARETH   Authorized by: Hayder Lincoln MD    Performed by: JARETH Villalobos  Patient location during procedure: OR    Indications and Patient Condition  Indications for airway management: anesthesia  Spontaneous Ventilation: absent  Sedation level: deep  Preoxygenated: yes  Patient position: sniffing  Mask difficulty assessment: 1 - vent by mask  Planned trial extubation    Final Airway Details  Final airway type: endotracheal airway      Successful airway: ETT  Cuffed: yes   Successful intubation technique: direct laryngoscopy  Facilitating devices/methods: intubating stylet  Blade: Ema  Blade size: #3  ETT size (mm): 7.5  Cormack-Lehane Classification: grade I - full view of glottis  Placement verified by: chest auscultation and capnometry   Measured from: lips  ETT to lips (cm): 22  Number of attempts at approach: 1

## 2024-10-09 NOTE — ANESTHESIA PROCEDURE NOTES
Peripheral IV  Date/Time: 10/9/2024 11:41 AM  Inserted by: Hayder Lincoln MD    Placement  Needle size: 18 G  Laterality: right  Location: hand  Site prep: alcohol  Attempts: 1

## 2024-10-09 NOTE — ANESTHESIA POSTPROCEDURE EVALUATION
Patient: Kika Marcelino    Procedure Summary       Date: 10/09/24 Room / Location: [facility], [place] OR 08 / Virtual [facility] OR    Anesthesia Start: 1125 Anesthesia Stop: 1537    Procedure: Nephrectomy Partial Robot-Assisted (Right) Diagnosis:       Right renal mass      (Right renal mass [N28.89])    Surgeons: Gerard Nixon MD Responsible Provider: Hayder Lincoln MD    Anesthesia Type: general ASA Status: 3            Anesthesia Type: general    Vitals Value Taken Time   /64 10/09/24 1715   Temp 36 °C (96.8 °F) 10/09/24 1715   Pulse 79 10/09/24 1716   Resp 13 10/09/24 1716   SpO2 97 % 10/09/24 1716   Vitals shown include unfiled device data.    Anesthesia Post Evaluation    Patient location during evaluation: PACU  Patient participation: complete - patient participated  Level of consciousness: awake and alert  Pain management: satisfactory to patient  Multimodal analgesia pain management approach  Airway patency: patent  Two or more strategies used to mitigate risk of obstructive sleep apnea  Cardiovascular status: acceptable  Respiratory status: acceptable  Hydration status: acceptable  Postoperative Nausea and Vomiting: none        No notable events documented.

## 2024-10-09 NOTE — H&P
"History Of Present Illness  58-year-old female with a right sided renal mass who presents for partial nephrectomy.  No recent changes in her health, preoperative testing reviewed.    Past Medical History  Past Medical History:   Diagnosis Date    COVID-19     Current smoker     Pneumonia 2024    Renal mass     right    Vision loss         Surgical History  Past Surgical History:   Procedure Laterality Date    D&C FIRST TRIMESTER / TX INCOMPLETE / MISSED / SEPTIC / INDUCED       OTHER SURGICAL HISTORY      DNC  done 2 y ago-        Social History  She reports that she has been smoking cigarettes. She started smoking about 40 years ago. She has a 10.1 pack-year smoking history. She has never used smokeless tobacco. She reports current alcohol use. She reports current drug use. Drug: Marijuana.    Family History  Family History   Problem Relation Name Age of Onset    Breast cancer Mother Gianna     Cancer Mother Gianna     Heart attack Father      Lung cancer Sister      Cancer Mother's Sister      Cancer Mother's Brother Otoniel     Cancer Sister Apolonia         Allergies  Patient has no known allergies.    ROS: 12 system review was completed and is negative with the exception of those signs and symptoms noted in the history of present illness: A 12 system review was completed and is negative with the exception of those signs and symptoms noted in the history of present illness.     Exam:  General: in NAD, appears stated age  Head: normocephalic, atraumatic  Respiratory: normal effort, no use of accessory muscles  Cardiovascular: no edema noted  Skin: normal turgor, no rashes  Neurologic: grossly intact, oriented to person/place/time  Psychiatric: mode and affect appropriate    Per anesthesiology, clear to auscultation bilaterally with a regular rate and rhythm     Last Recorded Vitals  /83   Pulse 73   Temp 36.7 °C (98.1 °F) (Temporal)   Resp 17   Ht 1.626 m (5' 4\")   Wt 97.1 kg (214 lb)   " "LMP  (LMP Unknown) Comment: more than ten years ago  SpO2 95%   BMI 36.73 kg/m²       No results found for: \"URINECULTURE\", \"CREATININE\"      ASSESSMENT/PLAN:  Okay to proceed with right robotic partial nephrectomy, possible radical nephrectomy    Gerard Nixon MD    "

## 2024-10-09 NOTE — ANESTHESIA PROCEDURE NOTES
Arterial Line:    Date/Time: 10/9/2024 11:43 AM    Staffing  Performed: JARETH   Authorized by: Hayder Lincoln MD    Performed by: JARETH Villalobos    An arterial line was placed. in the OR for the following indication(s): continuous blood pressure monitoring.    A 20 gauge (size) (length) (type) catheter was placed into the Left radial artery, secured by Tegaderm,   Seldinger technique used.  Events:  patient tolerated procedure well with no complications.

## 2024-10-09 NOTE — ANESTHESIA PREPROCEDURE EVALUATION
Patient: Kika Marcelino    Procedure Information       Date/Time: 10/09/24 0955    Procedure: Nephrectomy Partial Robot-Assisted (Right) - Requires intra-operative laparoscopic ultrasound from [facility]    Location: [facility] OR  / [facility] OR    Surgeons: Gerard Nixon MD            Relevant Problems   Pulmonary   (+) Primary atypical pneumonia      Liver   (+) Calculus of gallbladder without cholecystitis without obstruction      ID   (+) Primary atypical pneumonia       Clinical information reviewed:   Tobacco  Allergies  Meds   Med Hx  Surg Hx  OB Status  Fam Hx  Soc   Hx        NPO Detail:  NPO/Void Status  Carbohydrate Drink Given Prior to Surgery? : N  Date of Last Liquid: 10/09/24  Time of Last Liquid: 0700  Date of Last Solid: 10/08/24  Time of Last Solid: 2000  Last Intake Type: Clear fluids  Time of Last Void: 0825         Physical Exam    Airway  Mallampati: II  TM distance: >3 FB     Cardiovascular   Rhythm: regular  Rate: normal     Dental - normal exam     Pulmonary   Breath sounds clear to auscultation     Abdominal            Anesthesia Plan    History of general anesthesia?: yes  History of complications of general anesthesia?: no    ASA 3     general     The patient is not a current smoker.    intravenous induction   Anesthetic plan and risks discussed with patient.    Plan discussed with CAA.

## 2024-10-09 NOTE — OP NOTE
Nephrectomy Partial Robot-Assisted (R) Operative Note     Date: 10/9/2024  OR Location: [facility] OR    Name: Kika Marcelino, : 1965, Age: 58 y.o., MRN: [id number], Sex: female    Diagnosis  Pre-op Diagnosis      * Right renal mass [N28.89] Post-op Diagnosis     * Right renal mass [N28.89]     Procedures  Nephrectomy Partial Robot-Assisted  31755 - AL LAPAROSCOPY SURG PARTIAL NEPHRECTOMY      Surgeons      * Gerard Nixon - Primary    Resident/Fellow/Other Assistant:  Surgeons and Role:     * Myles Stafford MD - Assisting    Procedure Summary  Anesthesia: General  ASA: III  Anesthesia Staff: Anesthesiologist: Hayder Lincoln MD  C-AA: JARETH Villalobos  Estimated Blood Loss: 150mL  Intra-op Medications:   Administrations occurring from 0955 to 1435 on 10/09/24:   Medication Name Total Dose   ceFAZolin (Ancef) 2 g in dextrose (iso)  mL 2 g              Anesthesia Record               Intraprocedure I/O Totals          Intake    LR bolus 2000.00 mL    Nitroglycerin Drip 0.00 mL    The total shown is the total volume documented since Anesthesia Start was filed.    Total Intake 2000 mL       Output    Est. Blood Loss 150 mL    Total Output 150 mL       Net    Net Volume 1850 mL          Specimen:   ID Type Source Tests Collected by Time   1 : RIGHT RENAL MASS Tissue KIDNEY PARTIAL NEPHRECTOMY RIGHT SURGICAL PATHOLOGY EXAM Gerard Nixon MD 10/9/2024 1434        Staff:   Circulator: Claudia Zaragoza Person: Hermelinda  Circulator: Toney         Drains and/or Catheters:   Closed/Suction Drain RLQ Bulb 10 Fr. (Active)       Urethral Catheter Non-latex 16 Fr. (Active)       Tourniquet Times:         Implants:     Findings: 4 cm upper pole mass resected gross margins appear visually negative   -Mass invading segmental vein, encountered tumor thrombus within the vein and then distally resected vein visually negative vein margin  -Mass was sitting on renal hilum  -Grossly negative margins  -Warm ischemia time, 26  minutes  -Collecting system entry    Indications: Kika Marcelino is an 58 y.o. female who is having surgery for Right renal mass [N28.89].     The patient was seen in the preoperative area. The risks, benefits, complications, treatment options, non-operative alternatives, expected recovery and outcomes were discussed with the patient. The possibilities of reaction to medication, pulmonary aspiration, injury to surrounding structures, bleeding, recurrent infection, the need for additional procedures, failure to diagnose a condition, and creating a complication requiring transfusion or operation were discussed with the patient. The patient concurred with the proposed plan, giving informed consent.  The site of surgery was properly noted/marked if necessary per policy. The patient has been actively warmed in preoperative area. Preoperative antibiotics have been ordered and given within 1 hours of incision. Venous thrombosis prophylaxis have been ordered including bilateral sequential compression devices    Procedure Details:           Procedure: Patient was brought to the operating room and placed in the supine position.  General anesthesia was induced.  Once anesthetized, a Winn catheter was placed and the patient was positioned in the flank position with the right side up.  All bony prominences were appropriately padded and he was secured to the surgical table with 3 inch silk tape.  The patients right flank and abdomen were prepped and draped in the usual sterile fashion.  We began by introducing a Veress needle in the abdomen.  A saline drop test was used to confirm placement and then we insufflated the abdomen to 15 mmHg.      We made a small 8 mm incision along the lateral border of the rectus and inserted a robotic trocar.  We then inserted the laparoscope and inspected.  No adhesions were noted.  No injury from the Veress needle was noted.  Our assistant ports and robotic trochars were placed in their standard  position under direct vision. A 5 mm port was inserted and used with a locking allis to retract the liver.  We docked the da Anitha robot and I moved to the surgeon's console.  We began by mobilizing the right colon and reflecting it medially exposing the retroperitoneum.  Identified the IVC and dissected out the renal hilum.  A renal artery was identified.  A single renal vein was identified.  The arteries and vein were skeletonized.  We then defatted the upper kidney and identified the tumor. Ultrasound was used to delineate the borders of the tumor. The kidney was scored at the site of the planned resection.  We then sequentially clamped artery and vein.  We excised the tumor. Large vessels were clipped. We had entered the collecting system.   The base of the defect and collecting system was repaired with a 3-O Vlok suture.  The renal capsule was then reapproximated using a running 2-O Vlok in a vertical mattress fashion over fibular.  Artery and vein were unclamped.  There was good hemostasis from the repair.  Floseal was placed over the repair.  The specimen was placed in Endopouch retrieval bag.  We removed our fourth arm and inserted a Manjinder-Diaz drain.  It was left a drain in the perinephric space and secured the skin with an 0 silk suture.  The da Anitha robot was undocked.     We widened our perimedial trocar site and retrieved the specimen.  The fascia was closed with running 2-0 Vicryl suture.  The wounds were copiously irrigated, anesthetized with half percent Marcaine, and closed with 4-0 monocryl/Dermabond.  Patient was awakened and taken to the PACU in stable condition.      Complications:  None; patient tolerated the procedure well.    Disposition: PACU - hemodynamically stable.  Condition: stable           Attending Attestation:     Gerard Nixon  Phone Number: [phone]

## 2024-10-10 VITALS
SYSTOLIC BLOOD PRESSURE: 172 MMHG | RESPIRATION RATE: 20 BRPM | BODY MASS INDEX: 36.7 KG/M2 | HEIGHT: 64 IN | TEMPERATURE: 98.2 F | WEIGHT: 215 LBS | DIASTOLIC BLOOD PRESSURE: 83 MMHG | HEART RATE: 74 BPM | OXYGEN SATURATION: 95 %

## 2024-10-10 LAB
ANION GAP SERPL CALC-SCNC: 11 MMOL/L (ref 10–20)
BUN SERPL-MCNC: 14 MG/DL (ref 6–23)
CALCIUM SERPL-MCNC: 8.9 MG/DL (ref 8.6–10.3)
CHLORIDE SERPL-SCNC: 104 MMOL/L (ref 98–107)
CO2 SERPL-SCNC: 27 MMOL/L (ref 21–32)
CREAT SERPL-MCNC: 1.02 MG/DL (ref 0.5–1.05)
EGFRCR SERPLBLD CKD-EPI 2021: 64 ML/MIN/1.73M*2
ERYTHROCYTE [DISTWIDTH] IN BLOOD BY AUTOMATED COUNT: 13.3 % (ref 11.5–14.5)
GLUCOSE SERPL-MCNC: 102 MG/DL (ref 74–99)
HCT VFR BLD AUTO: 40.2 % (ref 36–46)
HGB BLD-MCNC: 13.3 G/DL (ref 12–16)
MCH RBC QN AUTO: 28.6 PG (ref 26–34)
MCHC RBC AUTO-ENTMCNC: 33.1 G/DL (ref 32–36)
MCV RBC AUTO: 87 FL (ref 80–100)
NRBC BLD-RTO: 0 /100 WBCS (ref 0–0)
PLATELET # BLD AUTO: 182 X10*3/UL (ref 150–450)
POTASSIUM SERPL-SCNC: 4.2 MMOL/L (ref 3.5–5.3)
RBC # BLD AUTO: 4.65 X10*6/UL (ref 4–5.2)
SODIUM SERPL-SCNC: 138 MMOL/L (ref 136–145)
WBC # BLD AUTO: 10.3 X10*3/UL (ref 4.4–11.3)

## 2024-10-10 PROCEDURE — 97116 GAIT TRAINING THERAPY: CPT | Mod: GP | Performed by: PHYSICAL THERAPIST

## 2024-10-10 PROCEDURE — 2500000005 HC RX 250 GENERAL PHARMACY W/O HCPCS: Performed by: STUDENT IN AN ORGANIZED HEALTH CARE EDUCATION/TRAINING PROGRAM

## 2024-10-10 PROCEDURE — 2500000001 HC RX 250 WO HCPCS SELF ADMINISTERED DRUGS (ALT 637 FOR MEDICARE OP): Performed by: STUDENT IN AN ORGANIZED HEALTH CARE EDUCATION/TRAINING PROGRAM

## 2024-10-10 PROCEDURE — 85027 COMPLETE CBC AUTOMATED: CPT | Performed by: STUDENT IN AN ORGANIZED HEALTH CARE EDUCATION/TRAINING PROGRAM

## 2024-10-10 PROCEDURE — 36415 COLL VENOUS BLD VENIPUNCTURE: CPT | Performed by: STUDENT IN AN ORGANIZED HEALTH CARE EDUCATION/TRAINING PROGRAM

## 2024-10-10 PROCEDURE — 97161 PT EVAL LOW COMPLEX 20 MIN: CPT | Mod: GP | Performed by: PHYSICAL THERAPIST

## 2024-10-10 PROCEDURE — 2500000004 HC RX 250 GENERAL PHARMACY W/ HCPCS (ALT 636 FOR OP/ED): Performed by: STUDENT IN AN ORGANIZED HEALTH CARE EDUCATION/TRAINING PROGRAM

## 2024-10-10 PROCEDURE — 82374 ASSAY BLOOD CARBON DIOXIDE: CPT | Performed by: STUDENT IN AN ORGANIZED HEALTH CARE EDUCATION/TRAINING PROGRAM

## 2024-10-10 PROCEDURE — 99239 HOSP IP/OBS DSCHRG MGMT >30: CPT | Performed by: NURSE PRACTITIONER

## 2024-10-10 RX ORDER — POLYETHYLENE GLYCOL 3350 17 G/17G
17 POWDER, FOR SOLUTION ORAL DAILY
Qty: 7 PACKET | Refills: 0 | Status: SHIPPED | OUTPATIENT
Start: 2024-10-11 | End: 2024-10-18

## 2024-10-10 RX ORDER — ACETAMINOPHEN 325 MG/1
650 TABLET ORAL EVERY 6 HOURS PRN
Start: 2024-10-10

## 2024-10-10 RX ORDER — OXYCODONE HYDROCHLORIDE 5 MG/1
5 TABLET ORAL EVERY 6 HOURS PRN
Qty: 12 TABLET | Refills: 0 | Status: SHIPPED | OUTPATIENT
Start: 2024-10-10 | End: 2024-10-13

## 2024-10-10 RX ORDER — DOCUSATE SODIUM 100 MG/1
100 CAPSULE, LIQUID FILLED ORAL 2 TIMES DAILY PRN
Qty: 28 CAPSULE | Refills: 0 | Status: SHIPPED | OUTPATIENT
Start: 2024-10-10 | End: 2024-10-30 | Stop reason: ALTCHOICE

## 2024-10-10 ASSESSMENT — PAIN SCALES - GENERAL
PAINLEVEL_OUTOF10: 0 - NO PAIN
PAINLEVEL_OUTOF10: 2
PAINLEVEL_OUTOF10: 1
PAINLEVEL_OUTOF10: 3
PAINLEVEL_OUTOF10: 5 - MODERATE PAIN
PAINLEVEL_OUTOF10: 7

## 2024-10-10 ASSESSMENT — COGNITIVE AND FUNCTIONAL STATUS - GENERAL
MOBILITY SCORE: 17
MOVING TO AND FROM BED TO CHAIR: A LITTLE
HELP NEEDED FOR BATHING: A LITTLE
WALKING IN HOSPITAL ROOM: A LITTLE
WALKING IN HOSPITAL ROOM: A LITTLE
TOILETING: A LITTLE
CLIMB 3 TO 5 STEPS WITH RAILING: A LOT
MOBILITY SCORE: 18
DRESSING REGULAR UPPER BODY CLOTHING: A LITTLE
MOVING TO AND FROM BED TO CHAIR: A LITTLE
MOVING FROM LYING ON BACK TO SITTING ON SIDE OF FLAT BED WITH BEDRAILS: A LITTLE
STANDING UP FROM CHAIR USING ARMS: A LITTLE
DAILY ACTIVITIY SCORE: 19
TURNING FROM BACK TO SIDE WHILE IN FLAT BAD: A LITTLE
MOVING FROM LYING ON BACK TO SITTING ON SIDE OF FLAT BED WITH BEDRAILS: A LITTLE
STANDING UP FROM CHAIR USING ARMS: A LITTLE
CLIMB 3 TO 5 STEPS WITH RAILING: A LITTLE
DRESSING REGULAR LOWER BODY CLOTHING: A LITTLE
TURNING FROM BACK TO SIDE WHILE IN FLAT BAD: A LITTLE
PERSONAL GROOMING: A LITTLE

## 2024-10-10 ASSESSMENT — PAIN - FUNCTIONAL ASSESSMENT
PAIN_FUNCTIONAL_ASSESSMENT: 0-10

## 2024-10-10 ASSESSMENT — PAIN DESCRIPTION - ORIENTATION: ORIENTATION: RIGHT

## 2024-10-10 ASSESSMENT — PAIN DESCRIPTION - LOCATION: LOCATION: ABDOMEN

## 2024-10-10 NOTE — PROGRESS NOTES
Kika Marcelino 58 y.o. female    Subjective  Patient seen and examined this morning.  Denies nausea and vomiting.  No fever or chills.  Reports incisional pain, managed with pain medications. Winn catheter with yellow urine.  ALLIE drain intact. Has not been out of bed yet.  No flatus or BM. Denies CP and SOB.  No acute events overnight.     Objective  PHYSICAL EXAM:  Physical Exam  Vitals reviewed.   Constitutional:       General: She is awake.      Appearance: Normal appearance.   Cardiovascular:      Rate and Rhythm: Normal rate and regular rhythm.      Pulses: Normal pulses.      Heart sounds: Normal heart sounds.   Pulmonary:      Effort: Pulmonary effort is normal.      Breath sounds: Normal breath sounds and air entry.   Abdominal:      General: Abdomen is flat. There is no distension.      Palpations: Abdomen is soft.      Tenderness: There is abdominal tenderness. There is no guarding or rebound.      Comments: Soft, non-distended.  Incisions well approximated with glue, C/D/I. RLQ ALLIE drain with SS drainage. Appropriately TTP.     Genitourinary:     Comments: Winn catheter with clear yellow urine.   Musculoskeletal:         General: Normal range of motion.      Cervical back: Normal range of motion.   Skin:     General: Skin is warm and dry.   Neurological:      General: No focal deficit present.      Mental Status: She is alert and oriented to person, place, and time.   Psychiatric:         Behavior: Behavior is cooperative.         Vital signs in last 24 hours:  Vitals:    10/10/24 0122   BP: 155/85   Pulse:    Resp:    Temp:    SpO2:          Intake/Output this shift:    Intake/Output Summary (Last 24 hours) at 10/10/2024 0815  Last data filed at 10/10/2024 0500  Gross per 24 hour   Intake 4798.33 ml   Output 2692 ml   Net 2106.33 ml        Allergies:  No Known Allergies     Medications:  Scheduled medications  acetaminophen, 975 mg, oral, q6h  lidocaine, 1 patch, transdermal, Daily  polyethylene  glycol, 17 g, oral, Daily      Continuous medications     PRN medications  PRN medications: albuterol, HYDROmorphone, ondansetron ODT **OR** ondansetron, oxyCODONE, oxyCODONE       Labs:  Results for orders placed or performed during the hospital encounter of 10/09/24 (from the past 24 hour(s))   CBC   Result Value Ref Range    WBC 10.3 4.4 - 11.3 x10*3/uL    nRBC 0.0 0.0 - 0.0 /100 WBCs    RBC 4.65 4.00 - 5.20 x10*6/uL    Hemoglobin 13.3 12.0 - 16.0 g/dL    Hematocrit 40.2 36.0 - 46.0 %    MCV 87 80 - 100 fL    MCH 28.6 26.0 - 34.0 pg    MCHC 33.1 32.0 - 36.0 g/dL    RDW 13.3 11.5 - 14.5 %    Platelets 182 150 - 450 x10*3/uL   Basic metabolic panel   Result Value Ref Range    Glucose 102 (H) 74 - 99 mg/dL    Sodium 138 136 - 145 mmol/L    Potassium 4.2 3.5 - 5.3 mmol/L    Chloride 104 98 - 107 mmol/L    Bicarbonate 27 21 - 32 mmol/L    Anion Gap 11 10 - 20 mmol/L    Urea Nitrogen 14 6 - 23 mg/dL    Creatinine 1.02 0.50 - 1.05 mg/dL    eGFR 64 >60 mL/min/1.73m*2    Calcium 8.9 8.6 - 10.3 mg/dL        Imaging:  MR kidney w and wo IV contrast    Result Date: 10/2/2024  Interpreted By:  Issac Navarro, STUDY: MR KIDNEY W AND WO IV CONTRAST;  10/1/2024 4:44 pm   INDICATION: Signs/Symptoms:renal mass.   ,N28.89 Other specified disorders of kidney and ureter   COMPARISON: CT kidney of 06/28/2024.   ACCESSION NUMBER(S): [id number]   ORDERING CLINICIAN: JULI MANCERA   TECHNIQUE: Multisequence multiplanar imaging of the abdomen was performed with attention to the kidneys. Sequences were obtained both without as well as following intravenous administration of 19 mL Dotarem contrast.   FINDINGS: Some motion artifact is present degrading image detail.   LIVER: Liver is enlarged measuring approximately 19 cm in craniocaudal dimension. Signal loss is evident throughout the hepatic parenchyma on opposed phase sequences compatible with fatty infiltration. No focal hepatic lesion is seen.   BILE DUCTS: No intrahepatic or  extrahepatic biliary dilation. No biliary intraductal filling defect is seen.   GALLBLADDER: Gallbladder is partially contracted with small folds of the gallbladder. There are large low signal intensity gallstones within the distal gallbladder toward the fundus similar to prior CT.   PANCREAS: No focal pancreatic lesion or ductal dilation is seen.   SPLEEN: Spleen is borderline in size. No discrete splenic lesion.   ADRENAL GLANDS: Unremarkable.   KIDNEYS: A right renal upper pole anterior partially exophytic complex mixed solid and cystic mass is again seen measuring 3.8 x 3.7 x 3.9 cm in transverse, AP, and CC dimensions respectively. Following contrast administration there is enhancement of the solid components of this complex mass. The deep margin of the mass extends to the right renal upper pole sinus fat level. No appreciable renal vein invasion.   A right renal upper pole posteromedial partially exophytic 1.5 cm T2 hyperintense nonenhancing cyst is present. Several additional subcentimeter right renal T2 hyperintense cortical cysts are also present. No right hydronephrosis.   There is a left central renal ovoid T1 hypointense, T2 hyperintense nonenhancing cyst measuring 2.5 x 1.7 cm in axial dimension. Several additional subcentimeter left renal T2 hyperintense cortical cysts are present. No enhancing left renal lesion. No left hydronephrosis.   LYMPH NODES: No lymphadenopathy is seen.   ABDOMINAL VESSELS: No aortic aneurysm. IVC is unremarkable.   BOWEL: Included portions of large and small bowel are not abnormally dilated.   PERITONEUM/RETROPERITONEUM: No free fluid is seen.   BONES AND LOWER THORAX: Mild lumbar levoscoliosis is present with partially visualized multilevel degenerative changes of the spine.       1.  Right renal upper pole anterior partially exophytic 3.8 x 3.7 x 3.9 cm mixed solid and cystic complex mass with enhancement of the solid components most compatible with renal neoplasm. 2.  Several small renal cysts bilaterally. No hydronephrosis bilaterally. 3. Hepatomegaly and fatty infiltration of the liver. 4. Cholelithiasis. No biliary dilation.     MACRO: None   Signed by: Issac Navarro 10/2/2024 10:50 AM Dictation workstation:   [id number]           Plan  Right renal mass     POD#1:  S/p  Nephrectomy Partial Robot-Assisted     - surgery as above  - avss  - Diet: advance to low fiber   - Pain control  - Nausea: antiemetics PRN  - Encourage OOB and IS  - DVT prophylaxis - on hold d/t increased risk of bleeding.   - Maintain ALLIE drain - will discharge with drain   - Remove mcnally catheter  - DC IVF  - Bowel regimen     - Daily labs    Plan of care discussed with Dr. Nixon and patient may discharge this afternoon.  Home medications and prescriptions as discussed.  Follow up in 1 week for drain removal.      KELLEY Valencia-CNP    I spent 35 minutes in the professional and overall care of this patient.

## 2024-10-10 NOTE — DISCHARGE INSTRUCTIONS
DEPARTMENT OF Urology  DISCHARGE INSTRUCTIONS -- Partial Nephrectomy  Inpatient Surgery    C O N F I D E N T I A L   I N F O R M A T I O N    Kika Marcelino      Call [phone] during regular daytime business hours (8:00 am - 5:00 pm) and after 5:00 pm ask for the Urology resident with any questions or concerns.    If it is a life-threatening situation, proceed to the nearest emergency department.        Thank you for the opportunity to care for you today.  Your health and healing are very important to us.  We hope we made you feel as comfortable as possible and are committed to your recovery and continued well-being.      The following is a brief overview of your nephrectomy procedure. Some of the information contained on this summary may be confidential.  This information should be kept in your records and should be shared with your regular doctor.    Physicians:   Dr. Nixon     Procedure performed: removal of part of your right kidney  Pending Results: surgical pathology    What to Expect During your Recovery and Home Care  Anesthesia Side Effects   You may feel sleepy, tired, or have a sore throat.   You may also feel drowsiness, dizziness, or inability to think clearly.  For your safety, do not drive, drink alcoholic beverages, take any unprescribed medication or make any important decisions for 24 hours after anesthesia.  A responsible adult should be with you for 24 hours.        Activity and Recovery    No heavy lifting greater than 10 pounds for the next 4 weeks. No driving until mobility has returned to normal. Do not drive or operate heavy machinery while taking narcotic pain medications as these medications can alter perception, impair judgement, and slow reaction times.  Pain Control  Unfortunately, you may experience pain after your procedure. Adequate pain management can include alternative measures to help ease your pain and that can include over the counter Tylenol/ibuprofen or oxycodone  which can be taken as prescribed as needed for breakthrough pain. Do not take more than 4,000mg of Tylenol in a 24-hour period.      If your procedure was done robotically you may experience gas pains from the surgery. Getting up and moving around is the best way to relieve those pains. You can also take some over the counter gas-x(simethicone).    Nausea/Vomiting   Clear liquids are best tolerated at first. Start slow, advance your diet as tolerated to normal foods. Avoid spicy, greasy, heavy foods at first. Also, you may feel nauseous or like you need to vomit if you take any type of medication on an empty stomach.  Call your physician if you are unable to eat or drink, are not passing gas and have persistent vomiting.    Signs of Bleeding   You could have some blood in your urine off and on over the next several weeks. Your urine will be light pink to yellow. Minor bleeding or drainage may occur from the surgical sites; however, excessive or consistent bleeding should be reported to your surgeon. Excessive bleeding is defined as blood that is dripping from wound, soaking you bandages, and is ketchup colored, thick with possible blood clots.  Consistent is defined as bleeding that does not stop.      Treatment/wound care:   Keep area(s) clean and dry.   It is okay to shower 24 hours after time of surgery.    Do not scrub wound(s), pat dry.    Do not submerge wound(s) in standing water until seen for follow up appointment (no tub bathing, swimming, or hot tubs).    Clean with mild soap, gentle washing, pat dry, cover with bandage as needed.    Avoid waterproof bandages.  No oils or lotions on incisions.  If you have staples, they will be removed in our clinic 10-14 days after the date of surgery.  Do not remove the staples on your own.      Please visually inspect your wound(s) at least once daily.  If the wound(s) are in a difficult to see location, please use a mirror or have someone else assist with visual  inspection.    Signs of Infection  Signs of infection can include fever, chills, redness around surgical incisions, green/yellow drainage from incisions, burning sensation with passing of urine, or severe abdominal pain.  If you see any of these occur, please contact your doctor's office at [phone].  Any fever higher than 100.4, especially if associated with an ill feeling, abdominal pain, chills, or nausea should be reported to your surgeon.      Assist in bowel movements/urination  Take daily stool softener you were prescribed  Increase fiber in diet  Increase water (6 to 8 glasses)  Increase walking   Add over the counter MiraLAX while you are taking your post-operative pain medication, or in extreme cases milk of magnesia.  If you have tried these methods and you are not passing gas, your bladder still feels full and you cannot have a bowel movement, please go to your nearest Emergency room/contact your physician.    Additional Instructions:   Use a small pillow to put pressure on your belly. This can make you more comfortable when you cough, laugh or do other actions.

## 2024-10-10 NOTE — CARE PLAN
The clinical goals for the shift include pain control    Over the shift, the patient did make progress toward the following goals.       Problem: Pain - Adult  Goal: Verbalizes/displays adequate comfort level or baseline comfort level  Outcome: Progressing     Problem: Safety - Adult  Goal: Free from fall injury  Outcome: Progressing     Problem: Discharge Planning  Goal: Discharge to home or other facility with appropriate resources  Outcome: Progressing     Problem: Chronic Conditions and Co-morbidities  Goal: Patient's chronic conditions and co-morbidity symptoms are monitored and maintained or improved  Outcome: Progressing     Problem: Pain  Goal: Walks with improved pain control throughout the shift  Outcome: Progressing

## 2024-10-10 NOTE — PROGRESS NOTES
10/10/24 0847   Discharge Planning   Living Arrangements Alone   Support Systems Family members   Type of Residence Private residence   Home or Post Acute Services None   Expected Discharge Disposition Home   Does the patient need discharge transport arranged? No     Met with patient at bedside. Admitted for R nephrectomy. Pt lives alone and was independent PTA with no HHC or DME. PCP is Dr Palma. Pt feels she is able to manage her health and understands her medcations. Was able to drive and obtain meds. Therapy evals pending. Pt plans to return home with no new discharge needs. Family will provide transport.

## 2024-10-10 NOTE — NURSING NOTE
Pt discharge teaching completed. Pt & pt's nephew voiced understanding. Iv's removed.     Pt is aware to purchase a blood pressure machine to monitor her blood pressure at home during recovery     Secure chat sent to Dr Nixon making him aware of pt's bp & that pt stets she is ok to leave & monitor her bp

## 2024-10-10 NOTE — PROGRESS NOTES
Physical Therapy    Physical Therapy Evaluation    Patient Name: Kika Marcelino  MRN: [id number]  Today's Date: 10/10/2024   Time Calculation  Start Time: 1020  Stop Time: 1041  Time Calculation (min): 21 min  4104/4104-A    Assessment/Plan   PT Assessment  PT Assessment Results: Impaired balance, Decreased mobility, Pain  Rehab Prognosis: Good  Evaluation/Treatment Tolerance: Patient tolerated treatment well  Medical Staff Made Aware: Yes  End of Session Communication: Bedside nurse  Assessment Comment: Pt is a 58 y.o. female admitted for Right renal mass [N28.89] on 10/9/2024. Pt below functional level and will benefit from skilled therapy during stay to improve overall functional mobility, strength, ROM, endurance and safety awareness. Upon discharge pt will benefit from low intensity therapy for continued improvement in functional mobility. Therapy will continue to follow and reassess each session.      End of Session Patient Position: Up in chair  IP OR SWING BED PT PLAN  Inpatient or Swing Bed: Inpatient  PT Plan  Treatment/Interventions: Bed mobility, Transfer training, Gait training, Stair training, Balance training, Strengthening, Therapeutic exercise, Therapeutic activity, Endurance training  PT Plan: Ongoing PT  PT Frequency: 5 times per week  PT Discharge Recommendations: Low intensity level of continued care  Equipment Recommended upon Discharge: Wheeled walker  PT Recommended Transfer Status: Contact guard  PT - OK to Discharge: Yes    Subjective     Current Problem:  1. Right renal mass  Place in outpatient/hospital ambulatory surgery    Place in outpatient/hospital ambulatory surgery    ceFAZolin (Ancef) 2 g in dextrose (iso)  mL    Surgical Pathology Exam    Surgical Pathology Exam    acetaminophen (Tylenol) 325 mg tablet    oxyCODONE (Roxicodone) 5 mg immediate release tablet    polyethylene glycol (Glycolax, Miralax) 17 gram packet    docusate sodium (Colace) 100 mg capsule    Drain/Tube Care     DISCONTINUED: sodium chloride 0.9% infusion    DISCONTINUED: acetaminophen (Tylenol) tablet 975 mg    CANCELED: Weigh patient    CANCELED: Measure height and length    CANCELED: Insert and maintain IV line    CANCELED: Weigh patient    CANCELED: Measure height and length    CANCELED: Insert and maintain IV line        Patient Active Problem List   Diagnosis    Primary atypical pneumonia    Acute hypoxemic respiratory failure (Multi)    Renal mass    Dyspnea    Shortness of breath    Ground glass opacity present on imaging of lung    COVID-19 long hauler manifesting chronic dyspnea    Hospital discharge follow-up    Calculus of gallbladder without cholecystitis without obstruction    Right renal mass       General Visit Information:  General  Reason for Referral: impaired mobility  Referred By: Dr. Nixon  Past Medical History Relevant to Rehab: Right renal mass      POD#1:  S/p  Nephrectomy Partial Robot-Assisted  Family/Caregiver Present: No  Prior to Session Communication: Bedside nurse  Patient Position Received: Up in chair  Preferred Learning Style: kinesthetic, verbal  General Comment: Pt agreeable to therapy    Home Living:  Home Living  Type of Home: Condo  Lives With: Alone  Home Layout: One level, Stairs to alternate level with rails  Alternate Level Stairs-Rails: Right  Alternate Level Stairs-Number of Steps: 10  Home Access: Stairs to enter with rails  Entrance Stairs-Rails: Right  Entrance Stairs-Number of Steps: 0  Bathroom Shower/Tub: Tub/shower unit    Prior Level of Function:  Prior Function Per Pt/Caregiver Report  Level of [place]: Independent with ADLs and functional transfers, Independent with homemaking with ambulation    Precautions:  Precautions  Medical Precautions: Fall precautions  Precautions Comment: ALLIE drain in abdomen    Vital Signs:     Objective     Pain:  Pain Assessment  Pain Assessment: 0-10  0-10 (Numeric) Pain Score: 3  Pain Type: Surgical  pain    Cognition:  Cognition  Overall Cognitive Status: Within Functional Limits  Orientation Level: Oriented X4    General Assessments:      Activity Tolerance  Endurance: Endurance does not limit participation in activity                 Static Sitting Balance  Static Sitting-Comment/Number of Minutes: good  Dynamic Sitting Balance  Dynamic Sitting-Comments: good  Static Standing Balance  Static Standing-Comment/Number of Minutes: good  Dynamic Standing Balance  Dynamic Standing-Comments: fair+    Functional Assessments:        Transfers  Transfer: Yes  Transfer 1  Transfer From 1: Bed to  Transfer to 1: Stand  Technique 1: Sit to stand, Stand to sit  Transfer Device 1: Walker  Transfer Level of Assistance 1: Contact guard  Ambulation/Gait Training  Ambulation/Gait Training Performed: Yes  Ambulation/Gait Training 1  Surface 1: Level tile  Device 1: Rolling walker  Gait Support Devices: Gait belt  Assistance 1: Contact guard  Quality of Gait 1:  (slow [place])  Comments/Distance (ft) 1: 100 ft          Extremity/Trunk Assessments:        RLE   RLE : Within Functional Limits  LLE   LLE : Within Functional Limits    Outcome Measures:     [facility] Basic Mobility  Turning from your back to your side while in a flat bed without using bedrails: A little  Moving from lying on your back to sitting on the side of a flat bed without using bedrails: A little  Moving to and from bed to chair (including a wheelchair): A little  Standing up from a chair using your arms (e.g. wheelchair or bedside chair): A little  To walk in hospital room: A little  Climbing 3-5 steps with railing: A lot  Basic Mobility - Total Score: 17          Goals:  Encounter Problems       Encounter Problems (Active)       PT Problem       Pt will demonstrate mod I for all bed mobility   (Progressing)       Start:  10/10/24    Expected End:  10/17/24            Pt will demonstrate mod I for all transfers with WW  (Progressing)       Start:  10/10/24     Expected End:  10/17/24            Pt will ambulate 250 ft with WW and mod I.  (Progressing)       Start:  10/10/24    Expected End:  10/17/24            Pt will be able to negotiate 10 steps with WW and mod I.  (Progressing)       Start:  10/10/24    Expected End:  10/17/24               Pain - Adult            Education Documentation  Precautions, taught by Cara Iyer PT at 10/10/2024  2:21 PM.  Learner: Patient  Readiness: Acceptance  Method: Explanation  Response: Verbalizes Understanding, Needs Reinforcement    Home Exercise Program, taught by Cara Iyer PT at 10/10/2024  2:21 PM.  Learner: Patient  Readiness: Acceptance  Method: Explanation  Response: Verbalizes Understanding, Needs Reinforcement    Mobility Training, taught by Cara Iyer PT at 10/10/2024  2:21 PM.  Learner: Patient  Readiness: Acceptance  Method: Explanation  Response: Verbalizes Understanding, Needs Reinforcement    Education Comments  No comments found.

## 2024-10-15 ENCOUNTER — APPOINTMENT (OUTPATIENT)
Dept: UROLOGY | Facility: CLINIC | Age: 59
End: 2024-10-15
Payer: COMMERCIAL

## 2024-10-15 VITALS — TEMPERATURE: 98.5 F | HEART RATE: 71 BPM | DIASTOLIC BLOOD PRESSURE: 90 MMHG | SYSTOLIC BLOOD PRESSURE: 161 MMHG

## 2024-10-15 DIAGNOSIS — N28.89 RIGHT RENAL MASS: ICD-10-CM

## 2024-10-15 PROCEDURE — 99211 OFF/OP EST MAY X REQ PHY/QHP: CPT | Performed by: NURSE PRACTITIONER

## 2024-10-15 NOTE — PROGRESS NOTES
Pt presented for drain removal. One stitch removed from drain. Drain removed without resistance. Pt tolerated drain removal well. Pt advised of drain hole care. Pt stated understanding.

## 2024-10-16 ENCOUNTER — APPOINTMENT (OUTPATIENT)
Dept: PRIMARY CARE | Facility: CLINIC | Age: 59
End: 2024-10-16
Payer: COMMERCIAL

## 2024-10-21 ENCOUNTER — APPOINTMENT (OUTPATIENT)
Dept: UROLOGY | Facility: CLINIC | Age: 59
End: 2024-10-21
Payer: COMMERCIAL

## 2024-10-21 VITALS
WEIGHT: 214.6 LBS | SYSTOLIC BLOOD PRESSURE: 142 MMHG | DIASTOLIC BLOOD PRESSURE: 85 MMHG | BODY MASS INDEX: 36.84 KG/M2 | TEMPERATURE: 98.6 F | HEART RATE: 81 BPM

## 2024-10-21 DIAGNOSIS — N28.89 RIGHT RENAL MASS: ICD-10-CM

## 2024-10-21 PROCEDURE — 99024 POSTOP FOLLOW-UP VISIT: CPT | Performed by: UROLOGY

## 2024-10-21 NOTE — PROGRESS NOTES
PRIOR NOTES  58-year-old female seeing me regarding a renal mass  PMH: Otherwise healthy  Creatinine 0.81, calcium 9.2, LFTs normal  WBC 16.7  PSH: No prior abdominal surgery  Patient presented to the ER 6/29/2024 with atypical pneumonia and was found to have an incidental renal mass on his CT PE.  She was hypoxic and required supplemental oxygen.     CT A/P with and without contrast, kidney protocol-right exophytic interpolar mass abutting the liver anteriorly, enhancing.  Remainder of kidney radiographically normal.  Left kidney simple cyst.  Mass is new since 2022.  No evidence of disease in chest or remainder of abdomen/pelvis.     08/12/24 -patient is no longer requiring supplemental oxygen but does have some dyspnea on exertion, persistent cough    MRI kidney 10/1/24 - renal mass w/ 3.9cm mass no suspicioun for renal vein involvement    10/9/24 - OR for R partial - 4cm mass, margins grossly negative, invading into segmental vein, ischemia 26min    UPDATED SUBJECTIVE HISTORY  10/21/24 - here to discuss pathology and convalesence. She is doing well. Good Bms, appetite.     Past Medical History  She has a past medical history of COVID-19, Current smoker, Pneumonia (06/27/2024), Renal mass, and Vision loss.    She has no past medical history of VALERIE (acute kidney injury) (CMS-HCC), Anxiety, Arthritis, Autoimmune disorder (Multi), Bipolar disorder, BPH (benign prostatic hyperplasia), Cerebral aneurysm ([facility]), Cervical cancer, Cervical disc disease, Chronic kidney disease, CKD (chronic kidney disease), Cognitive decline, Crohn's disease (Multi), Dementia, Depression, Dizziness, Dysphagia, Endometrial cancer (Multi), Esophageal cancer (Multi), Esophageal disease, ESRD (end stage renal disease) (Multi), Fibromyalgia, primary, Fractures, Gastric cancer (Multi), Gender dysphoria, GERD (gastroesophageal reflux disease), GI (gastrointestinal bleed), Hemodialysis status (CMS-HCC), Hernia, internal, History of peritoneal  dialysis, HIV disease (Multi), Immunocompromised, Irritable bowel syndrome, Liver disease, Lumbar disc disease, Mastocytosis, MS (multiple sclerosis) (Multi), Muscular dystrophy (Multi), Myasthenia gravis, Neuromuscular disorder (Multi), Ovarian cancer (Multi), Pancreatitis (HHS-HCC), Peptic ulcer disease, Prematurity (HHS-HCC), PTSD (post-traumatic stress disorder), Schizophrenia, Seizure disorder (Multi), Spinal stenosis, Substance addiction (Multi), Syncope, TIA (transient ischemic attack), Ulcerative colitis, Urinary tract infection, Uterine cancer (Multi), or Vertigo.    Surgical History  She has a past surgical history that includes Other surgical history and D&C first trimester / Tx incomplete / missed / septic / induced .     Social History  She reports that she has been smoking cigarettes. She started smoking about 40 years ago. She has a 10.1 pack-year smoking history. She has never used smokeless tobacco. She reports current alcohol use. She reports current drug use. Drug: Marijuana.    Family History  Family History   Problem Relation Name Age of Onset    Breast cancer Mother Gianna     Cancer Mother Gianna     Heart attack Father      Lung cancer Sister      Cancer Mother's Sister      Cancer Mother's Brother Otoniel     Cancer Sister Apolonia         Allergies  Patient has no known allergies.    ROS: 12 system review was completed and is negative with the exception of those signs and symptoms noted in the history of present illness: A 12 system review was completed and is negative with the exception of those signs and symptoms noted in the history of present illness.     Exam:  General: in NAD, appears stated age  Head: normocephalic, atraumatic  Respiratory: normal effort, no use of accessory muscles  Cardiovascular: no edema noted  Skin: normal turgor, no rashes  Neurologic: grossly intact, oriented to person/place/time  Psychiatric: mode and affect appropriate  Incisions c/d/I, no hernias     Last  Recorded Vitals  Blood pressure 142/85, pulse 81, temperature 37 °C (98.6 °F), temperature source Temporal, weight 97.3 kg (214 lb 9.6 oz).    Lab Results   Component Value Date    CREATININE 1.02 10/10/2024    HGB 13.3 10/10/2024         ASSESSMENT/PLAN:  Renal cell carcinoma  -Suspect T3 with invasion of renal vein, segmental vein  -Discussed the potential for adjuvant immunotherapy pending final diagnosis  -Surveillance imaging with CT chest/abdomen/pelvis in 4 months, follow-up at that time  -Complete metabolic panel at that time as well    Gerard Nixon MD

## 2024-10-23 ENCOUNTER — HOSPITAL ENCOUNTER (OUTPATIENT)
Dept: RADIOLOGY | Facility: HOSPITAL | Age: 59
Discharge: HOME | End: 2024-10-23
Payer: COMMERCIAL

## 2024-10-23 DIAGNOSIS — R06.02 SHORTNESS OF BREATH: ICD-10-CM

## 2024-10-23 DIAGNOSIS — R91.8 GROUND GLASS OPACITY PRESENT ON IMAGING OF LUNG: ICD-10-CM

## 2024-10-23 DIAGNOSIS — J18.9 PRIMARY ATYPICAL PNEUMONIA: ICD-10-CM

## 2024-10-23 PROCEDURE — 71250 CT THORAX DX C-: CPT

## 2024-10-23 PROCEDURE — 71250 CT THORAX DX C-: CPT | Performed by: RADIOLOGY

## 2024-10-23 NOTE — DISCHARGE SUMMARY
Discharge Diagnosis  Right renal mass    Issues Requiring Follow-Up  Drain removal    Test Results Pending At Discharge  Pending Labs       Order Current Status    Surgical Pathology Exam In process            Hospital Course   Patient was admitted following robotic partial nephrectomy.  Unremarkable postop course.  Convalesced appropriately.  Labs, vitals, exam reassuring and patient was discharged on postop day 1.    Pertinent Physical Exam At Time of Discharge  Physical Exam    Home Medications     Medication List      START taking these medications     acetaminophen 325 mg tablet; Commonly known as: Tylenol; Take 2 tablets   (650 mg) by mouth every 6 hours if needed for mild pain (1 - 3).   docusate sodium 100 mg capsule; Commonly known as: Colace; Take 1   capsule (100 mg) by mouth 2 times a day as needed for constipation for up   to 14 days.     CONTINUE taking these medications     albuterol 90 mcg/actuation inhaler; Commonly known as: ProAir HFA;   Inhale 2 puffs every 6 hours if needed for wheezing or shortness of   breath.   ibuprofen 200 mg tablet     ASK your doctor about these medications     budesonide-glycopyr-formoterol 160-9-4.8 mcg/actuation HFA aerosol   inhaler; Commonly known as: BREZTRI; Inhale 2 puffs 2 times a day.   oxyCODONE 5 mg immediate release tablet; Commonly known as: Roxicodone;   Take 1 tablet (5 mg) by mouth every 6 hours if needed for severe pain (7 -   10) for up to 3 days.; Ask about: Should I take this medication?   polyethylene glycol 17 gram packet; Commonly known as: Glycolax,   Miralax; Take 17 g by mouth once daily for 7 days.; Ask about: Should I   take this medication?       Outpatient Follow-Up  Future Appointments   Date Time Provider Department Center   10/30/2024  2:20 PM Rashaad Palma MD [id number] [place]   2/21/2025  1:40 PM Gerard Nixon MD [id number] [place]       Gerard Nixon MD

## 2024-10-30 ENCOUNTER — APPOINTMENT (OUTPATIENT)
Dept: PRIMARY CARE | Facility: CLINIC | Age: 59
End: 2024-10-30
Payer: COMMERCIAL

## 2024-10-30 VITALS
RESPIRATION RATE: 16 BRPM | TEMPERATURE: 98.2 F | WEIGHT: 213 LBS | HEIGHT: 64 IN | HEART RATE: 74 BPM | SYSTOLIC BLOOD PRESSURE: 158 MMHG | OXYGEN SATURATION: 96 % | DIASTOLIC BLOOD PRESSURE: 80 MMHG | BODY MASS INDEX: 36.37 KG/M2

## 2024-10-30 DIAGNOSIS — N28.89 RIGHT RENAL MASS: Primary | ICD-10-CM

## 2024-10-30 DIAGNOSIS — K80.20 CALCULUS OF GALLBLADDER WITHOUT CHOLECYSTITIS WITHOUT OBSTRUCTION: ICD-10-CM

## 2024-10-30 DIAGNOSIS — J18.9 PRIMARY ATYPICAL PNEUMONIA: ICD-10-CM

## 2024-10-30 DIAGNOSIS — Z72.0 SMOKING TRYING TO QUIT: ICD-10-CM

## 2024-10-30 DIAGNOSIS — K44.9 HIATAL HERNIA: ICD-10-CM

## 2024-10-30 PROCEDURE — 3008F BODY MASS INDEX DOCD: CPT | Performed by: INTERNAL MEDICINE

## 2024-10-30 PROCEDURE — 99406 BEHAV CHNG SMOKING 3-10 MIN: CPT | Performed by: INTERNAL MEDICINE

## 2024-10-30 PROCEDURE — 99214 OFFICE O/P EST MOD 30 MIN: CPT | Performed by: INTERNAL MEDICINE

## 2024-10-30 ASSESSMENT — ENCOUNTER SYMPTOMS
COUGH: 0
FATIGUE: 0
ADENOPATHY: 0
SHORTNESS OF BREATH: 0
SORE THROAT: 0
VOMITING: 0
JOINT SWELLING: 0
SLEEP DISTURBANCE: 0
WHEEZING: 0
CHEST TIGHTNESS: 0
ARTHRALGIAS: 0
CONSTIPATION: 0
DIARRHEA: 0
PALPITATIONS: 0
CONFUSION: 0
DYSURIA: 0
CHILLS: 0
ABDOMINAL PAIN: 0
NAUSEA: 0
UNEXPECTED WEIGHT CHANGE: 0
WEAKNESS: 0
DIZZINESS: 0

## 2024-11-02 LAB
LAB AP ASR DISCLAIMER: NORMAL
LAB AP BLOCK FOR ADDITIONAL STUDIES: NORMAL
LABORATORY COMMENT REPORT: NORMAL
PATH REPORT.FINAL DX SPEC: NORMAL
PATH REPORT.GROSS SPEC: NORMAL
PATH REPORT.RELEVANT HX SPEC: NORMAL
PATH REPORT.TOTAL CANCER: NORMAL
PATHOLOGY SYNOPTIC REPORT: NORMAL
RESIDENT REVIEW: NORMAL

## 2024-11-05 ENCOUNTER — TELEPHONE (OUTPATIENT)
Dept: UROLOGY | Facility: HOSPITAL | Age: 59
End: 2024-11-05
Payer: COMMERCIAL

## 2024-11-05 DIAGNOSIS — C64.1 RENAL CELL CANCER, RIGHT (MULTI): ICD-10-CM

## 2024-11-05 DIAGNOSIS — N28.89 RIGHT RENAL MASS: Primary | ICD-10-CM

## 2024-11-05 NOTE — TELEPHONE ENCOUNTER
Called and informed patient of pathology.  Clear-cell renal cell carcinoma, grade 3, invading segmental vein consistent with T3, stage III renal cell carcinoma.    I recommended she meet with Dr. Finnegan to discuss adjuvant immunotherapy which she is open to.  We briefly discussed the potential side effects and typical time course for immunotherapy and repeat imaging.

## 2024-11-05 NOTE — DOCUMENTATION CLARIFICATION NOTE
"    PATIENT:               RIVERA LUGO  ACCT #:                  [id number]  MRN:                       [id number]  :                       1965  ADMIT DATE:       10/9/2024 7:52 AM  DISCH DATE:        10/10/2024 7:12 PM  RESPONDING PROVIDER #:        [id number]          PROVIDER RESPONSE TEXT:    I concur with the pathology report findings and they are clinically significant    CDI QUERY TEXT:    Clarification        Instruction:    Based on your assessment of the patient and the clinical information, please provide the requested documentation by clicking on the appropriate radio button and enter any additional information if prompted.    Question: Please document whether you concur or do not concur with the pathology report findings    When answering this query, please exercise your independent professional judgment. The fact that a question is being asked, does not imply that any particular answer is desired or expected.    The patient's clinical indicators include:  Clinical Information: 58 year old female admitted for right partial nephrectomy    Clinical Indicators and Pathology Findings:  Path results collected 10/9/24 -  \"RIGHT KIDNEY, PARTIAL NEPHRECTOMY:  -- CLEAR CELL RENAL CELL CARCINOMA, G3, 3.9 CM.  -- TUMOR EXTENDS INTO SEGMENTAL BRANCH OF RENAL VEIN.  -- MARGINS OF RESECTION NEGATIVE FOR CARCINOMA.  -- SEE NOTE AND CANCER CASE SUMMARY.    NOTE: On immunohistochemical stains, tumor cells show diffuse staining for CAIX, focal peripheral staining for CAM 5.2, and negative staining for CK7. This staining pattern is compatible with clear cell renal cell carcinoma.      Treatment: Right Partial nephrectomy  Risk Factors: Kidney mass  Options provided:  -- I concur with the pathology report findings and they are clinically significant  -- I do not concur with the pathology report findings  -- Other - I will add my own diagnosis  -- Refer to Clinical Documentation Reviewer    Query created by: Viji " Rossi on 11/5/2024 10:27 AM      Electronically signed by:  JULI MANCERA MD 11/5/2024 10:39 AM

## 2024-11-20 ENCOUNTER — PATIENT OUTREACH (OUTPATIENT)
Dept: HEMATOLOGY/ONCOLOGY | Facility: HOSPITAL | Age: 59
End: 2024-11-20
Payer: COMMERCIAL

## 2024-11-20 NOTE — PROGRESS NOTES
Patient referred by Dr. Gerard Membreno for pathology noting clear-cell renal cell carcinoma, grade 3, invading segmental vein consistent with T3, stage III renal cell carcinoma.  Results in the system.  Left voicemail for patient regarding new patient visit on 11/21/24 with Dr. Cecilio Finnegan at [facility].

## 2024-11-21 ENCOUNTER — OFFICE VISIT (OUTPATIENT)
Dept: HEMATOLOGY/ONCOLOGY | Facility: CLINIC | Age: 59
End: 2024-11-21
Payer: COMMERCIAL

## 2024-11-21 VITALS
SYSTOLIC BLOOD PRESSURE: 139 MMHG | WEIGHT: 215.1 LBS | OXYGEN SATURATION: 97 % | BODY MASS INDEX: 36.72 KG/M2 | HEIGHT: 64 IN | TEMPERATURE: 98.2 F | DIASTOLIC BLOOD PRESSURE: 84 MMHG | RESPIRATION RATE: 18 BRPM | HEART RATE: 91 BPM

## 2024-11-21 DIAGNOSIS — Z51.12 ENCOUNTER FOR ANTINEOPLASTIC IMMUNOTHERAPY: ICD-10-CM

## 2024-11-21 DIAGNOSIS — Z72.0 TOBACCO USE: ICD-10-CM

## 2024-11-21 DIAGNOSIS — C64.1 RENAL CELL CANCER, RIGHT (MULTI): Primary | ICD-10-CM

## 2024-11-21 DIAGNOSIS — N28.89 RIGHT RENAL MASS: ICD-10-CM

## 2024-11-21 PROCEDURE — 99205 OFFICE O/P NEW HI 60 MIN: CPT | Performed by: STUDENT IN AN ORGANIZED HEALTH CARE EDUCATION/TRAINING PROGRAM

## 2024-11-21 PROCEDURE — 3008F BODY MASS INDEX DOCD: CPT | Performed by: STUDENT IN AN ORGANIZED HEALTH CARE EDUCATION/TRAINING PROGRAM

## 2024-11-21 PROCEDURE — 99215 OFFICE O/P EST HI 40 MIN: CPT | Performed by: STUDENT IN AN ORGANIZED HEALTH CARE EDUCATION/TRAINING PROGRAM

## 2024-11-21 RX ORDER — FAMOTIDINE 10 MG/ML
20 INJECTION INTRAVENOUS ONCE AS NEEDED
OUTPATIENT
Start: 2025-01-03

## 2024-11-21 RX ORDER — PROCHLORPERAZINE MALEATE 5 MG
10 TABLET ORAL EVERY 6 HOURS PRN
OUTPATIENT
Start: 2025-01-03

## 2024-11-21 RX ORDER — ALBUTEROL SULFATE 0.83 MG/ML
3 SOLUTION RESPIRATORY (INHALATION) AS NEEDED
OUTPATIENT
Start: 2025-01-03

## 2024-11-21 RX ORDER — DIPHENHYDRAMINE HYDROCHLORIDE 50 MG/ML
50 INJECTION INTRAMUSCULAR; INTRAVENOUS AS NEEDED
OUTPATIENT
Start: 2025-01-03

## 2024-11-21 RX ORDER — EPINEPHRINE 0.3 MG/.3ML
0.3 INJECTION SUBCUTANEOUS EVERY 5 MIN PRN
OUTPATIENT
Start: 2025-01-03

## 2024-11-21 RX ORDER — PROCHLORPERAZINE EDISYLATE 5 MG/ML
10 INJECTION INTRAMUSCULAR; INTRAVENOUS EVERY 6 HOURS PRN
OUTPATIENT
Start: 2025-01-03

## 2024-11-21 SDOH — ECONOMIC STABILITY: FOOD INSECURITY: WITHIN THE PAST 12 MONTHS, THE FOOD YOU BOUGHT JUST DIDN'T LAST AND YOU DIDN'T HAVE MONEY TO GET MORE.: NEVER TRUE

## 2024-11-21 SDOH — ECONOMIC STABILITY: HOUSING INSECURITY: IN THE LAST 12 MONTHS, WAS THERE A TIME WHEN YOU WERE NOT ABLE TO PAY THE MORTGAGE OR RENT ON TIME?: NO

## 2024-11-21 SDOH — ECONOMIC STABILITY: HOUSING INSECURITY: AT ANY TIME IN THE PAST 12 MONTHS, WERE YOU HOMELESS OR LIVING IN A SHELTER (INCLUDING NOW)?: NO

## 2024-11-21 SDOH — SOCIAL STABILITY: SOCIAL NETWORK: HOW OFTEN DO YOU ATTEND CHURCH OR RELIGIOUS SERVICES?: 1 TO 4 TIMES PER YEAR

## 2024-11-21 SDOH — SOCIAL STABILITY: SOCIAL INSECURITY: ARE YOU MARRIED, WIDOWED, DIVORCED, SEPARATED, NEVER MARRIED, OR LIVING WITH A PARTNER?: NEVER MARRIED

## 2024-11-21 SDOH — HEALTH STABILITY: PHYSICAL HEALTH: ON AVERAGE, HOW MANY DAYS PER WEEK DO YOU ENGAGE IN MODERATE TO STRENUOUS EXERCISE (LIKE A BRISK WALK)?: 5 DAYS

## 2024-11-21 SDOH — SOCIAL STABILITY: SOCIAL INSECURITY: WITHIN THE LAST YEAR, HAVE YOU BEEN HUMILIATED OR EMOTIONALLY ABUSED IN OTHER WAYS BY YOUR PARTNER OR EX-PARTNER?: NO

## 2024-11-21 SDOH — ECONOMIC STABILITY: INCOME INSECURITY: IN THE PAST 12 MONTHS HAS THE ELECTRIC, GAS, OIL, OR WATER COMPANY THREATENED TO SHUT OFF SERVICES IN YOUR HOME?: NO

## 2024-11-21 SDOH — ECONOMIC STABILITY: FOOD INSECURITY: WITHIN THE PAST 12 MONTHS, YOU WORRIED THAT YOUR FOOD WOULD RUN OUT BEFORE YOU GOT THE MONEY TO BUY MORE.: NEVER TRUE

## 2024-11-21 SDOH — HEALTH STABILITY: MENTAL HEALTH: HOW OFTEN DO YOU HAVE A DRINK CONTAINING ALCOHOL?: MONTHLY OR LESS

## 2024-11-21 SDOH — HEALTH STABILITY: PHYSICAL HEALTH: ON AVERAGE, HOW MANY MINUTES DO YOU ENGAGE IN EXERCISE AT THIS LEVEL?: 20 MIN

## 2024-11-21 SDOH — SOCIAL STABILITY: SOCIAL NETWORK: HOW OFTEN DO YOU ATTEND MEETINGS OF THE CLUBS OR ORGANIZATIONS YOU BELONG TO?: NEVER

## 2024-11-21 SDOH — HEALTH STABILITY: MENTAL HEALTH: HOW OFTEN DO YOU HAVE SIX OR MORE DRINKS ON ONE OCCASION?: NEVER

## 2024-11-21 SDOH — SOCIAL STABILITY: SOCIAL INSECURITY: WITHIN THE LAST YEAR, HAVE YOU BEEN AFRAID OF YOUR PARTNER OR EX-PARTNER?: NO

## 2024-11-21 SDOH — HEALTH STABILITY: PHYSICAL HEALTH
HOW OFTEN DO YOU NEED TO HAVE SOMEONE HELP YOU WHEN YOU READ INSTRUCTIONS, PAMPHLETS, OR OTHER WRITTEN MATERIAL FROM YOUR DOCTOR OR PHARMACY?: RARELY

## 2024-11-21 SDOH — SOCIAL STABILITY: SOCIAL NETWORK
IN A TYPICAL WEEK, HOW MANY TIMES DO YOU TALK ON THE PHONE WITH FAMILY, FRIENDS, OR NEIGHBORS?: MORE THAN THREE TIMES A WEEK

## 2024-11-21 SDOH — SOCIAL STABILITY: SOCIAL NETWORK
DO YOU BELONG TO ANY CLUBS OR ORGANIZATIONS SUCH AS CHURCH GROUPS, UNIONS, FRATERNAL OR ATHLETIC GROUPS, OR SCHOOL GROUPS?: NO

## 2024-11-21 SDOH — HEALTH STABILITY: MENTAL HEALTH
DO YOU FEEL STRESS - TENSE, RESTLESS, NERVOUS, OR ANXIOUS, OR UNABLE TO SLEEP AT NIGHT BECAUSE YOUR MIND IS TROUBLED ALL THE TIME - THESE DAYS?: TO SOME EXTENT

## 2024-11-21 SDOH — ECONOMIC STABILITY: HOUSING INSECURITY: IN THE PAST 12 MONTHS, HOW MANY TIMES HAVE YOU MOVED WHERE YOU WERE LIVING?: 0

## 2024-11-21 SDOH — HEALTH STABILITY: MENTAL HEALTH: HOW MANY DRINKS CONTAINING ALCOHOL DO YOU HAVE ON A TYPICAL DAY WHEN YOU ARE DRINKING?: 1 OR 2

## 2024-11-21 SDOH — SOCIAL STABILITY: SOCIAL NETWORK: HOW OFTEN DO YOU GET TOGETHER WITH FRIENDS OR RELATIVES?: TWICE A WEEK

## 2024-11-21 SDOH — ECONOMIC STABILITY: FOOD INSECURITY: HOW HARD IS IT FOR YOU TO PAY FOR THE VERY BASICS LIKE FOOD, HOUSING, MEDICAL CARE, AND HEATING?: NOT HARD AT ALL

## 2024-11-21 SDOH — ECONOMIC STABILITY: TRANSPORTATION INSECURITY: IN THE PAST 12 MONTHS, HAS LACK OF TRANSPORTATION KEPT YOU FROM MEDICAL APPOINTMENTS OR FROM GETTING MEDICATIONS?: NO

## 2024-11-21 ASSESSMENT — PAIN SCALES - GENERAL: PAINLEVEL_OUTOF10: 0-NO PAIN

## 2024-11-21 ASSESSMENT — LIFESTYLE VARIABLES
SKIP TO QUESTIONS 9-10: 1
AUDIT-C TOTAL SCORE: 1

## 2024-11-21 ASSESSMENT — ACTIVITIES OF DAILY LIVING (ADL): LACK_OF_TRANSPORTATION: NO

## 2024-11-21 ASSESSMENT — ENCOUNTER SYMPTOMS
LOSS OF SENSATION IN FEET: 0
DEPRESSION: 0
OCCASIONAL FEELINGS OF UNSTEADINESS: 0

## 2024-12-17 ENCOUNTER — LAB (OUTPATIENT)
Dept: LAB | Facility: CLINIC | Age: 59
End: 2024-12-17
Payer: COMMERCIAL

## 2024-12-17 DIAGNOSIS — C64.1 RENAL CELL CANCER, RIGHT (MULTI): ICD-10-CM

## 2024-12-17 LAB
ALBUMIN SERPL BCP-MCNC: 4.2 G/DL (ref 3.4–5)
ALP SERPL-CCNC: 56 U/L (ref 33–110)
ALT SERPL W P-5'-P-CCNC: 10 U/L (ref 7–45)
ANION GAP SERPL CALC-SCNC: 13 MMOL/L (ref 10–20)
AST SERPL W P-5'-P-CCNC: 17 U/L (ref 9–39)
BASOPHILS # BLD AUTO: 0.03 X10*3/UL (ref 0–0.1)
BASOPHILS NFR BLD AUTO: 0.4 %
BILIRUB SERPL-MCNC: 0.5 MG/DL (ref 0–1.2)
BUN SERPL-MCNC: 18 MG/DL (ref 6–23)
CALCIUM SERPL-MCNC: 9.5 MG/DL (ref 8.6–10.3)
CHLORIDE SERPL-SCNC: 103 MMOL/L (ref 98–107)
CO2 SERPL-SCNC: 27 MMOL/L (ref 21–32)
CORTIS AM PEAK SERPL-MSCNC: 5.8 UG/DL (ref 5–20)
CREAT SERPL-MCNC: 0.95 MG/DL (ref 0.5–1.05)
EGFRCR SERPLBLD CKD-EPI 2021: 69 ML/MIN/1.73M*2
EOSINOPHIL # BLD AUTO: 0.17 X10*3/UL (ref 0–0.7)
EOSINOPHIL NFR BLD AUTO: 2.4 %
ERYTHROCYTE [DISTWIDTH] IN BLOOD BY AUTOMATED COUNT: 13.5 % (ref 11.5–14.5)
GLUCOSE SERPL-MCNC: 100 MG/DL (ref 74–99)
HBV CORE AB SER QL: NONREACTIVE
HBV SURFACE AB SER-ACNC: <3.1 MIU/ML
HBV SURFACE AG SERPL QL IA: NONREACTIVE
HCT VFR BLD AUTO: 42.3 % (ref 36–46)
HGB BLD-MCNC: 14.2 G/DL (ref 12–16)
IMM GRANULOCYTES # BLD AUTO: 0.03 X10*3/UL (ref 0–0.7)
IMM GRANULOCYTES NFR BLD AUTO: 0.4 % (ref 0–0.9)
LYMPHOCYTES # BLD AUTO: 1.76 X10*3/UL (ref 1.2–4.8)
LYMPHOCYTES NFR BLD AUTO: 25.2 %
MCH RBC QN AUTO: 28.2 PG (ref 26–34)
MCHC RBC AUTO-ENTMCNC: 33.6 G/DL (ref 32–36)
MCV RBC AUTO: 84 FL (ref 80–100)
MONOCYTES # BLD AUTO: 0.48 X10*3/UL (ref 0.1–1)
MONOCYTES NFR BLD AUTO: 6.9 %
NEUTROPHILS # BLD AUTO: 4.52 X10*3/UL (ref 1.2–7.7)
NEUTROPHILS NFR BLD AUTO: 64.7 %
NRBC BLD-RTO: NORMAL /100{WBCS}
PLATELET # BLD AUTO: 212 X10*3/UL (ref 150–450)
POTASSIUM SERPL-SCNC: 4 MMOL/L (ref 3.5–5.3)
PROT SERPL-MCNC: 6.9 G/DL (ref 6.4–8.2)
RBC # BLD AUTO: 5.04 X10*6/UL (ref 4–5.2)
SODIUM SERPL-SCNC: 139 MMOL/L (ref 136–145)
TSH SERPL-ACNC: 1.3 MIU/L (ref 0.44–3.98)
WBC # BLD AUTO: 7 X10*3/UL (ref 4.4–11.3)

## 2024-12-17 PROCEDURE — 80053 COMPREHEN METABOLIC PANEL: CPT

## 2024-12-17 PROCEDURE — 82024 ASSAY OF ACTH: CPT

## 2024-12-17 PROCEDURE — 86704 HEP B CORE ANTIBODY TOTAL: CPT

## 2024-12-17 PROCEDURE — 84443 ASSAY THYROID STIM HORMONE: CPT

## 2024-12-17 PROCEDURE — 85025 COMPLETE CBC W/AUTO DIFF WBC: CPT

## 2024-12-17 PROCEDURE — 87340 HEPATITIS B SURFACE AG IA: CPT

## 2024-12-17 PROCEDURE — 86706 HEP B SURFACE ANTIBODY: CPT

## 2024-12-17 PROCEDURE — 36415 COLL VENOUS BLD VENIPUNCTURE: CPT

## 2024-12-17 PROCEDURE — 82533 TOTAL CORTISOL: CPT

## 2024-12-19 LAB — ACTH PLAS-MCNC: 10.2 PG/ML (ref 7.2–63.3)

## 2025-01-03 ENCOUNTER — OFFICE VISIT (OUTPATIENT)
Dept: HEMATOLOGY/ONCOLOGY | Facility: CLINIC | Age: 60
End: 2025-01-03
Payer: COMMERCIAL

## 2025-01-03 ENCOUNTER — APPOINTMENT (OUTPATIENT)
Dept: LAB | Facility: CLINIC | Age: 60
End: 2025-01-03
Payer: COMMERCIAL

## 2025-01-03 ENCOUNTER — INFUSION (OUTPATIENT)
Dept: HEMATOLOGY/ONCOLOGY | Facility: CLINIC | Age: 60
End: 2025-01-03
Payer: COMMERCIAL

## 2025-01-03 VITALS
RESPIRATION RATE: 16 BRPM | WEIGHT: 215.9 LBS | HEART RATE: 76 BPM | BODY MASS INDEX: 37.09 KG/M2 | TEMPERATURE: 97.7 F | DIASTOLIC BLOOD PRESSURE: 93 MMHG | SYSTOLIC BLOOD PRESSURE: 167 MMHG | OXYGEN SATURATION: 95 %

## 2025-01-03 DIAGNOSIS — C64.1 RENAL CELL CANCER, RIGHT (MULTI): ICD-10-CM

## 2025-01-03 DIAGNOSIS — C64.1 RENAL CELL CANCER, RIGHT (MULTI): Primary | ICD-10-CM

## 2025-01-03 PROCEDURE — 2500000004 HC RX 250 GENERAL PHARMACY W/ HCPCS (ALT 636 FOR OP/ED): Mod: JZ,JG,TB | Performed by: STUDENT IN AN ORGANIZED HEALTH CARE EDUCATION/TRAINING PROGRAM

## 2025-01-03 PROCEDURE — 99215 OFFICE O/P EST HI 40 MIN: CPT | Performed by: NURSE PRACTITIONER

## 2025-01-03 PROCEDURE — 96413 CHEMO IV INFUSION 1 HR: CPT

## 2025-01-03 RX ORDER — PROCHLORPERAZINE MALEATE 10 MG
10 TABLET ORAL EVERY 6 HOURS PRN
Status: DISCONTINUED | OUTPATIENT
Start: 2025-01-03 | End: 2025-01-03 | Stop reason: HOSPADM

## 2025-01-03 RX ORDER — EPINEPHRINE 0.3 MG/.3ML
0.3 INJECTION SUBCUTANEOUS EVERY 5 MIN PRN
Status: DISCONTINUED | OUTPATIENT
Start: 2025-01-03 | End: 2025-01-03 | Stop reason: HOSPADM

## 2025-01-03 RX ORDER — ALBUTEROL SULFATE 0.83 MG/ML
3 SOLUTION RESPIRATORY (INHALATION) AS NEEDED
Status: DISCONTINUED | OUTPATIENT
Start: 2025-01-03 | End: 2025-01-03 | Stop reason: HOSPADM

## 2025-01-03 RX ORDER — PROCHLORPERAZINE EDISYLATE 5 MG/ML
10 INJECTION INTRAMUSCULAR; INTRAVENOUS EVERY 6 HOURS PRN
Status: DISCONTINUED | OUTPATIENT
Start: 2025-01-03 | End: 2025-01-03 | Stop reason: HOSPADM

## 2025-01-03 RX ORDER — FAMOTIDINE 10 MG/ML
20 INJECTION INTRAVENOUS ONCE AS NEEDED
Status: DISCONTINUED | OUTPATIENT
Start: 2025-01-03 | End: 2025-01-03 | Stop reason: HOSPADM

## 2025-01-03 RX ORDER — DIPHENHYDRAMINE HYDROCHLORIDE 50 MG/ML
50 INJECTION INTRAMUSCULAR; INTRAVENOUS AS NEEDED
Status: DISCONTINUED | OUTPATIENT
Start: 2025-01-03 | End: 2025-01-03 | Stop reason: HOSPADM

## 2025-01-03 RX ADMIN — SODIUM CHLORIDE 200 MG: 9 INJECTION, SOLUTION INTRAVENOUS at 09:46

## 2025-01-03 ASSESSMENT — PAIN SCALES - GENERAL: PAINLEVEL_OUTOF10: 0-NO PAIN

## 2025-01-03 NOTE — SIGNIFICANT EVENT
01/03/25 0908   Prechemo Checklist   Has the patient been in the hospital, ED, or urgent care since last date of service No   Chemo/Immuno Consent Completed and Signed Yes   Protocol/Indications Verified Yes   Confirmed to previous date/time of medication N/A   Compared to previous dose N/A   All medications are dated accurately Yes   Pregnancy Test Negative Not applicable   Parameters Met Yes   BSA/Weight-Height Verified Yes   Dose Calculations Verified (current, total, cumulative) Yes

## 2025-01-03 NOTE — PROGRESS NOTES
Patient ID: Kika Marcelino is a 59 y.o. female.  Attending Physician: Dr. Cecilio Finnegan  Cancer Diagnosis: Cancer Staging   Renal cell cancer, right (Multi)  Staging form: Kidney, AJCC 8th Edition  - Clinical stage from 10/9/2024: Stage III (ycT3a, cN0, cM0) - Signed by Gerard Nixon MD on 11/5/2024     Current Therapy: Adjuvant Pembrolizumab  200 mg IV day 1  21-day cycles  Genetics: N/A    Subjective      Cancer History:  Oncology History   Renal cell cancer, right (Multi)   8/13/2024 Initial Diagnosis    Renal cell cancer, right (Multi)     10/9/2024 Cancer Staged    Staging form: Kidney, AJCC 8th Edition, Clinical stage from 10/9/2024: Stage III (ycT3a, cN0, cM0) - Signed by Gerard Nixon MD on 11/5/2024     1/3/2025 -  Chemotherapy    Pembrolizumab, 21 Day Cycles       6/27/24 Admitted for COVID PNA, incidental renal mass found    6/28/24 CT kidney shows R renal lesion     10/1/24 MR kidney - 3.9 cm R renal mass     10/9/24 R partial nephrectomy - 3.9 cm ccRCC of R kidney, grade 3. Invade segmental vein. Margins negative. Staged [id number]     10/23/24 CT chest without metastasis, resolution of previously identified infiltrate    1/3/25  Cycle 1 Day 1 Adjuvant Pembrolizumab    Interval History:  Ms. Marcelino presents today in follow up and consideration of cycle 1 day 1 adjuvant pembrolizumab. She states she feels well overall. She has no real medical problems at this time. The remainder of her ROS is otherwise negative.    HPI    Objective    BSA: 2.1 meters squared  BP (!) 167/93 (BP Location: Left arm, Patient Position: Sitting)   Pulse 76   Temp 36.5 °C (97.7 °F) (Temporal)   Resp 16   Wt 97.9 kg (215 lb 14.4 oz)   LMP  (LMP Unknown) Comment: more than ten years ago  SpO2 95%   BMI 37.09 kg/m²     Physical Exam  General: alert, well-dressed in NAD. Speech is fluent and coherent, words clear. Good insight. Oriented x4   Skin: warm, dry, and pink without cyanosis or nail clubbing. No rash, petechiae, or  ecchymoses  HEENT: Normocephalic atraumatic. Sclera white, conjunctiva pink. EOMs intact. Hearing intact to spoken voice. Oral mucosa pink. No visible lesions  Respiratory: Chest expansion symmetric. Breath sounds vesicular in all lobes without crackles, wheezes, or rhonchi b/ilaterally.  CV: S1S2 audible and crisp without murmurs, rubs, or extra sounds. RRR. Radial pulses strong and regular. Extremities warm and pink. No edema bilaterally.  Lymph: no palpable cervical, submandibular, or supraclavicular lymphadenopathy.  GI: abdomen is round, soft, and non-tender. Active bowel sounds.   Psych: engaged, polite, appropriate conversation and eye contact.    Current Medications:    Current Outpatient Medications:     ibuprofen 200 mg tablet, Take 1 tablet (200 mg) by mouth every 6 hours if needed (pain)., Disp: , Rfl:      Most Recent Labs:  Results for orders placed or performed in visit on 12/17/24   CBC and Auto Differential    Collection Time: 12/17/24 11:53 AM   Result Value Ref Range    WBC 7.0 4.4 - 11.3 x10*3/uL    nRBC      RBC 5.04 4.00 - 5.20 x10*6/uL    Hemoglobin 14.2 12.0 - 16.0 g/dL    Hematocrit 42.3 36.0 - 46.0 %    MCV 84 80 - 100 fL    MCH 28.2 26.0 - 34.0 pg    MCHC 33.6 32.0 - 36.0 g/dL    RDW 13.5 11.5 - 14.5 %    Platelets 212 150 - 450 x10*3/uL    Neutrophils % 64.7 40.0 - 80.0 %    Immature Granulocytes %, Automated 0.4 0.0 - 0.9 %    Lymphocytes % 25.2 13.0 - 44.0 %    Monocytes % 6.9 2.0 - 10.0 %    Eosinophils % 2.4 0.0 - 6.0 %    Basophils % 0.4 0.0 - 2.0 %    Neutrophils Absolute 4.52 1.20 - 7.70 x10*3/uL    Immature Granulocytes Absolute, Automated 0.03 0.00 - 0.70 x10*3/uL    Lymphocytes Absolute 1.76 1.20 - 4.80 x10*3/uL    Monocytes Absolute 0.48 0.10 - 1.00 x10*3/uL    Eosinophils Absolute 0.17 0.00 - 0.70 x10*3/uL    Basophils Absolute 0.03 0.00 - 0.10 x10*3/uL   Comprehensive metabolic panel    Collection Time: 12/17/24 11:53 AM   Result Value Ref Range    Glucose 100 (H) 74 - 99  "mg/dL    Sodium 139 136 - 145 mmol/L    Potassium 4.0 3.5 - 5.3 mmol/L    Chloride 103 98 - 107 mmol/L    Bicarbonate 27 21 - 32 mmol/L    Anion Gap 13 10 - 20 mmol/L    Urea Nitrogen 18 6 - 23 mg/dL    Creatinine 0.95 0.50 - 1.05 mg/dL    eGFR 69 >60 mL/min/1.73m*2    Calcium 9.5 8.6 - 10.3 mg/dL    Albumin 4.2 3.4 - 5.0 g/dL    Alkaline Phosphatase 56 33 - 110 U/L    Total Protein 6.9 6.4 - 8.2 g/dL    AST 17 9 - 39 U/L    Bilirubin, Total 0.5 0.0 - 1.2 mg/dL    ALT 10 7 - 45 U/L   Hepatitis B surface antigen    Collection Time: 12/17/24 11:53 AM   Result Value Ref Range    Hepatitis B Surface AG Nonreactive Nonreactive   Hepatitis B Core Antibody, Total    Collection Time: 12/17/24 11:53 AM   Result Value Ref Range    Hepatitis B Core AB- Total Nonreactive Nonreactive   Hepatitis B surface antibody    Collection Time: 12/17/24 11:53 AM   Result Value Ref Range    Hepatitis B Surface AB <3.1 <10.0 mIU/mL   Acth    Collection Time: 12/17/24 11:53 AM   Result Value Ref Range    Adrenocorticotropic Hormone (ACTH) 10.2 7.2 - 63.3 pg/mL   Cortisol Am    Collection Time: 12/17/24 11:53 AM   Result Value Ref Range    Cortisol  A.M. 5.8 5.0 - 20.0 ug/dL   Tsh With Reflex To Free T4 If Abnormal    Collection Time: 12/17/24 11:53 AM   Result Value Ref Range    Thyroid Stimulating Hormone 1.30 0.44 - 3.98 mIU/L      No results found for: \"PSA\"     Performance Status:  ECOG Score: 0- Fully active, able to carry on all pre-disease performance w/o restriction.  Karnofsky Score: 100 - Fully active, able to carry on all pre-disease performed without restriction      Assessment/Plan   Kika Marcelino is a 59 y.o. female with a history of RCC who presents in follow up on adjuvant pembrolizumab.     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 She was recommended to start adjuvant pembrolizumab.    Today, she presents for " cycle 1. All her questions were answered, and we discussed again expected and unexpected side effects of pembrolizumab.    # Stage III clear cell renal cell carcinoma  - Follows with urology (Dr. Nixon)  - Cycle 1 pembro today  - Next imaging scheduled Feb 2025     # Tobacco Use  - Encourage cessation    RTC 3 weeks with labs    Total time spent on this encounter was 40 minutes, which included preparation, direct time with patient, documentation, and care coordination on the day of visit.    Savita Schreiber, MSN, APRN, AGNP-C, AOCNP  Associate Nurse Practitioner  [facility], [facility]

## 2025-01-12 ENCOUNTER — APPOINTMENT (OUTPATIENT)
Dept: CARDIOLOGY | Facility: HOSPITAL | Age: 60
End: 2025-01-12
Payer: COMMERCIAL

## 2025-01-12 ENCOUNTER — APPOINTMENT (OUTPATIENT)
Dept: RADIOLOGY | Facility: HOSPITAL | Age: 60
End: 2025-01-12
Payer: COMMERCIAL

## 2025-01-12 ENCOUNTER — HOSPITAL ENCOUNTER (EMERGENCY)
Facility: HOSPITAL | Age: 60
Discharge: HOME | End: 2025-01-12
Attending: EMERGENCY MEDICINE
Payer: COMMERCIAL

## 2025-01-12 VITALS
BODY MASS INDEX: 36.85 KG/M2 | DIASTOLIC BLOOD PRESSURE: 86 MMHG | OXYGEN SATURATION: 97 % | TEMPERATURE: 97.2 F | SYSTOLIC BLOOD PRESSURE: 158 MMHG | HEART RATE: 87 BPM | RESPIRATION RATE: 16 BRPM | WEIGHT: 215.83 LBS | HEIGHT: 64 IN

## 2025-01-12 DIAGNOSIS — J18.9 PNEUMONIA OF BOTH LUNGS DUE TO INFECTIOUS ORGANISM, UNSPECIFIED PART OF LUNG: Primary | ICD-10-CM

## 2025-01-12 DIAGNOSIS — J18.9 PNEUMONIA DUE TO INFECTIOUS ORGANISM, UNSPECIFIED LATERALITY, UNSPECIFIED PART OF LUNG: ICD-10-CM

## 2025-01-12 LAB
ALBUMIN SERPL BCP-MCNC: 4.2 G/DL (ref 3.4–5)
ALP SERPL-CCNC: 51 U/L (ref 33–110)
ALT SERPL W P-5'-P-CCNC: 13 U/L (ref 7–45)
ANION GAP SERPL CALC-SCNC: 13 MMOL/L (ref 10–20)
AST SERPL W P-5'-P-CCNC: 26 U/L (ref 9–39)
BASOPHILS # BLD AUTO: 0.02 X10*3/UL (ref 0–0.1)
BASOPHILS NFR BLD AUTO: 0.3 %
BILIRUB SERPL-MCNC: 0.6 MG/DL (ref 0–1.2)
BNP SERPL-MCNC: 18 PG/ML (ref 0–99)
BUN SERPL-MCNC: 16 MG/DL (ref 6–23)
CALCIUM SERPL-MCNC: 9 MG/DL (ref 8.6–10.3)
CARDIAC TROPONIN I PNL SERPL HS: 5 NG/L (ref 0–13)
CARDIAC TROPONIN I PNL SERPL HS: 5 NG/L (ref 0–13)
CHLORIDE SERPL-SCNC: 98 MMOL/L (ref 98–107)
CO2 SERPL-SCNC: 27 MMOL/L (ref 21–32)
CREAT SERPL-MCNC: 1.11 MG/DL (ref 0.5–1.05)
EGFRCR SERPLBLD CKD-EPI 2021: 57 ML/MIN/1.73M*2
EOSINOPHIL # BLD AUTO: 0.16 X10*3/UL (ref 0–0.7)
EOSINOPHIL NFR BLD AUTO: 2.8 %
ERYTHROCYTE [DISTWIDTH] IN BLOOD BY AUTOMATED COUNT: 13.9 % (ref 11.5–14.5)
FLUAV RNA RESP QL NAA+PROBE: NOT DETECTED
FLUBV RNA RESP QL NAA+PROBE: NOT DETECTED
GLUCOSE SERPL-MCNC: 90 MG/DL (ref 74–99)
HCT VFR BLD AUTO: 41.9 % (ref 36–46)
HGB BLD-MCNC: 14 G/DL (ref 12–16)
IMM GRANULOCYTES # BLD AUTO: 0.01 X10*3/UL (ref 0–0.7)
IMM GRANULOCYTES NFR BLD AUTO: 0.2 % (ref 0–0.9)
INR PPP: 1 (ref 0.9–1.1)
LYMPHOCYTES # BLD AUTO: 1.31 X10*3/UL (ref 1.2–4.8)
LYMPHOCYTES NFR BLD AUTO: 22.6 %
MCH RBC QN AUTO: 28.5 PG (ref 26–34)
MCHC RBC AUTO-ENTMCNC: 33.4 G/DL (ref 32–36)
MCV RBC AUTO: 85 FL (ref 80–100)
MONOCYTES # BLD AUTO: 0.49 X10*3/UL (ref 0.1–1)
MONOCYTES NFR BLD AUTO: 8.5 %
NEUTROPHILS # BLD AUTO: 3.8 X10*3/UL (ref 1.2–7.7)
NEUTROPHILS NFR BLD AUTO: 65.6 %
NRBC BLD-RTO: 0 /100 WBCS (ref 0–0)
PLATELET # BLD AUTO: 167 X10*3/UL (ref 150–450)
POTASSIUM SERPL-SCNC: 3.6 MMOL/L (ref 3.5–5.3)
PROT SERPL-MCNC: 6.6 G/DL (ref 6.4–8.2)
PROTHROMBIN TIME: 11.7 SECONDS (ref 9.8–12.8)
RBC # BLD AUTO: 4.92 X10*6/UL (ref 4–5.2)
RSV RNA RESP QL NAA+PROBE: NOT DETECTED
SARS-COV-2 RNA RESP QL NAA+PROBE: NOT DETECTED
SODIUM SERPL-SCNC: 134 MMOL/L (ref 136–145)
WBC # BLD AUTO: 5.8 X10*3/UL (ref 4.4–11.3)

## 2025-01-12 PROCEDURE — 2500000002 HC RX 250 W HCPCS SELF ADMINISTERED DRUGS (ALT 637 FOR MEDICARE OP, ALT 636 FOR OP/ED): Performed by: EMERGENCY MEDICINE

## 2025-01-12 PROCEDURE — 2500000004 HC RX 250 GENERAL PHARMACY W/ HCPCS (ALT 636 FOR OP/ED): Performed by: EMERGENCY MEDICINE

## 2025-01-12 PROCEDURE — 2550000001 HC RX 255 CONTRASTS: Performed by: EMERGENCY MEDICINE

## 2025-01-12 PROCEDURE — 71045 X-RAY EXAM CHEST 1 VIEW: CPT

## 2025-01-12 PROCEDURE — 85025 COMPLETE CBC W/AUTO DIFF WBC: CPT | Performed by: EMERGENCY MEDICINE

## 2025-01-12 PROCEDURE — 80053 COMPREHEN METABOLIC PANEL: CPT | Performed by: EMERGENCY MEDICINE

## 2025-01-12 PROCEDURE — 71045 X-RAY EXAM CHEST 1 VIEW: CPT | Performed by: RADIOLOGY

## 2025-01-12 PROCEDURE — 36415 COLL VENOUS BLD VENIPUNCTURE: CPT | Performed by: EMERGENCY MEDICINE

## 2025-01-12 PROCEDURE — 71275 CT ANGIOGRAPHY CHEST: CPT | Performed by: RADIOLOGY

## 2025-01-12 PROCEDURE — 84484 ASSAY OF TROPONIN QUANT: CPT | Performed by: EMERGENCY MEDICINE

## 2025-01-12 PROCEDURE — 71275 CT ANGIOGRAPHY CHEST: CPT

## 2025-01-12 PROCEDURE — 94640 AIRWAY INHALATION TREATMENT: CPT

## 2025-01-12 PROCEDURE — 85610 PROTHROMBIN TIME: CPT | Performed by: EMERGENCY MEDICINE

## 2025-01-12 PROCEDURE — 99285 EMERGENCY DEPT VISIT HI MDM: CPT | Mod: 25 | Performed by: EMERGENCY MEDICINE

## 2025-01-12 PROCEDURE — 87637 SARSCOV2&INF A&B&RSV AMP PRB: CPT | Performed by: EMERGENCY MEDICINE

## 2025-01-12 PROCEDURE — 2500000001 HC RX 250 WO HCPCS SELF ADMINISTERED DRUGS (ALT 637 FOR MEDICARE OP): Performed by: EMERGENCY MEDICINE

## 2025-01-12 PROCEDURE — 96374 THER/PROPH/DIAG INJ IV PUSH: CPT

## 2025-01-12 PROCEDURE — 83880 ASSAY OF NATRIURETIC PEPTIDE: CPT | Performed by: EMERGENCY MEDICINE

## 2025-01-12 PROCEDURE — 93005 ELECTROCARDIOGRAM TRACING: CPT

## 2025-01-12 RX ORDER — LABETALOL HYDROCHLORIDE 5 MG/ML
10 INJECTION, SOLUTION INTRAVENOUS ONCE
Status: COMPLETED | OUTPATIENT
Start: 2025-01-12 | End: 2025-01-12

## 2025-01-12 RX ORDER — IPRATROPIUM BROMIDE AND ALBUTEROL SULFATE 2.5; .5 MG/3ML; MG/3ML
3 SOLUTION RESPIRATORY (INHALATION) ONCE
Status: COMPLETED | OUTPATIENT
Start: 2025-01-12 | End: 2025-01-12

## 2025-01-12 RX ORDER — AMOXICILLIN AND CLAVULANATE POTASSIUM 875; 125 MG/1; MG/1
1 TABLET, FILM COATED ORAL ONCE
Status: COMPLETED | OUTPATIENT
Start: 2025-01-12 | End: 2025-01-12

## 2025-01-12 RX ORDER — AZITHROMYCIN 500 MG/1
500 TABLET, FILM COATED ORAL ONCE
Status: COMPLETED | OUTPATIENT
Start: 2025-01-12 | End: 2025-01-12

## 2025-01-12 RX ORDER — AMOXICILLIN AND CLAVULANATE POTASSIUM 875; 125 MG/1; MG/1
1 TABLET, FILM COATED ORAL EVERY 12 HOURS
Qty: 9 TABLET | Refills: 0 | Status: SHIPPED | OUTPATIENT
Start: 2025-01-12 | End: 2025-01-17

## 2025-01-12 RX ORDER — AZITHROMYCIN 250 MG/1
TABLET, FILM COATED ORAL
Qty: 4 TABLET | Refills: 0 | Status: SHIPPED | OUTPATIENT
Start: 2025-01-12

## 2025-01-12 RX ADMIN — LABETALOL HYDROCHLORIDE 10 MG: 5 INJECTION, SOLUTION INTRAVENOUS at 18:36

## 2025-01-12 RX ADMIN — AMOXICILLIN AND CLAVULANATE POTASSIUM 1 TABLET: 875; 125 TABLET, FILM COATED ORAL at 20:15

## 2025-01-12 RX ADMIN — IOHEXOL 60 ML: 350 INJECTION, SOLUTION INTRAVENOUS at 18:24

## 2025-01-12 RX ADMIN — AZITHROMYCIN 500 MG: 500 TABLET, FILM COATED ORAL at 20:15

## 2025-01-12 RX ADMIN — IPRATROPIUM BROMIDE AND ALBUTEROL SULFATE 3 ML: 2.5; .5 SOLUTION RESPIRATORY (INHALATION) at 17:17

## 2025-01-12 ASSESSMENT — CURB65 SCORE
CURB65 SCORE: 0
SBP LESS THAN 90 OR DBNP LESS THAN 60: NO
RESPIRATORY RATE GREATER THAN OR EQUAL TO 30: NO
AGE IS GREATER THAN OR EQUAL TO 65: NO
BUN IS GREATER THAN 19 MG/DL: NO
HAS CONFUSION: NO

## 2025-01-12 ASSESSMENT — PAIN SCALES - GENERAL
PAINLEVEL_OUTOF10: 0 - NO PAIN
PAINLEVEL_OUTOF10: 0 - NO PAIN

## 2025-01-12 ASSESSMENT — PAIN - FUNCTIONAL ASSESSMENT: PAIN_FUNCTIONAL_ASSESSMENT: 0-10

## 2025-01-12 ASSESSMENT — LIFESTYLE VARIABLES
TOTAL SCORE: 0
HAVE PEOPLE ANNOYED YOU BY CRITICIZING YOUR DRINKING: NO
EVER FELT BAD OR GUILTY ABOUT YOUR DRINKING: NO
HAVE YOU EVER FELT YOU SHOULD CUT DOWN ON YOUR DRINKING: NO
EVER HAD A DRINK FIRST THING IN THE MORNING TO STEADY YOUR NERVES TO GET RID OF A HANGOVER: NO

## 2025-01-12 ASSESSMENT — PAIN DESCRIPTION - PAIN TYPE: TYPE: ACUTE PAIN

## 2025-01-12 ASSESSMENT — COLUMBIA-SUICIDE SEVERITY RATING SCALE - C-SSRS
1. IN THE PAST MONTH, HAVE YOU WISHED YOU WERE DEAD OR WISHED YOU COULD GO TO SLEEP AND NOT WAKE UP?: NO
2. HAVE YOU ACTUALLY HAD ANY THOUGHTS OF KILLING YOURSELF?: NO
6. HAVE YOU EVER DONE ANYTHING, STARTED TO DO ANYTHING, OR PREPARED TO DO ANYTHING TO END YOUR LIFE?: NO

## 2025-01-12 ASSESSMENT — PAIN DESCRIPTION - PROGRESSION: CLINICAL_PROGRESSION: NOT CHANGED

## 2025-01-12 ASSESSMENT — PAIN DESCRIPTION - DESCRIPTORS: DESCRIPTORS: HEAVINESS

## 2025-01-12 ASSESSMENT — PAIN DESCRIPTION - FREQUENCY: FREQUENCY: CONSTANT/CONTINUOUS

## 2025-01-12 ASSESSMENT — PAIN DESCRIPTION - LOCATION: LOCATION: CHEST

## 2025-01-12 NOTE — ED PROVIDER NOTES
EMERGENCY DEPARTMENT ENCOUNTER      Pt Name: Kika Marcelino  MRN: [id number]  Birthdate 1965  Date of evaluation: 2025  Provider: Michele Jesus DO    CHIEF COMPLAINT       Chief Complaint   Patient presents with    Cough     Since tuesday    Shortness of Breath     Started Friday  Hx of a tumor on right kidney, which has been removed and is now on immunotherapy -> 1st treatment was last friday     HISTORY OF PRESENT ILLNESS    Kika Marcelino is a 59 y.o. year old female who presents to the ER for cough, sore throat. Cough started Tuesday, Thursday started to have wheezing, and to hours ago was encouraged by nieces and nephews to be seen. + sore throat, + dyspnea, not changed with exertion or lying flat.   No fevers, nausea/vomiting, chest pain, abdominal pain, changes to urine or stool.  PMH  Renal clear cell carcinoma stage III treated with surgery and immunotherapy, no chemo or radiation.  Started Keytruda .   PSH right partial nephrectomy 2024  Onc: Cecilio Finnegan and Savita Schreiber NP  Uro: Hussain  NKDA  Current tobacco use, social alcohol use, + marijuana use    PAST MEDICAL HISTORY     Past Medical History:   Diagnosis Date    COVID-19     Current smoker     Pneumonia 2024    Renal mass     right    Vision loss      CURRENT MEDICATIONS       Previous Medications    No medications on file     SURGICAL HISTORY       Past Surgical History:   Procedure Laterality Date    D&C FIRST TRIMESTER / TX INCOMPLETE / MISSED / SEPTIC / INDUCED       OTHER SURGICAL HISTORY      DNC  done 2 y ago-     ALLERGIES     Patient has no known allergies.  FAMILY HISTORY       Family History   Problem Relation Name Age of Onset    Breast cancer Mother Gianna     Cancer Mother Gianna     Heart attack Father      Lung cancer Sister      Cancer Mother's Sister      Cancer Mother's Brother Otoniel     Cancer Sister Apolonia      SOCIAL HISTORY       Social History     Tobacco Use    Smoking status: Every  Day     Current packs/day: 0.25     Average packs/day: 0.3 packs/day for 40.6 years (10.2 ttl pk-yrs)     Types: Cigarettes     Start date: 6/4/1984    Smokeless tobacco: Never    Tobacco comments:     Smokes one pack per week   Vaping Use    Vaping status: Never Used   Substance Use Topics    Alcohol use: Yes     Comment: Socially    Drug use: Yes     Types: Marijuana     Comment: Rarely     PHYSICAL EXAM  (up to 7 for level 4, 8 or more for level 5)     ED Triage Vitals [01/12/25 1446]   Temperature Heart Rate Respirations BP   36.2 °C (97.2 °F) 89 18 178/86      Pulse Ox Temp Source Heart Rate Source Patient Position   95 % Temporal Monitor Sitting      BP Location FiO2 (%)     Right arm --       Physical Exam  Vitals and nursing note reviewed.   Constitutional:       General: She is not in acute distress.     Appearance: Normal appearance. She is not ill-appearing.   HENT:      Head: Normocephalic and atraumatic. No raccoon eyes, Clifton's sign, contusion or laceration.      Jaw: No trismus or malocclusion.      Right Ear: External ear normal.      Left Ear: External ear normal.      Mouth/Throat:      Mouth: Mucous membranes are moist.      Pharynx: Oropharynx is clear.   Eyes:      Extraocular Movements: Extraocular movements intact.      Pupils: Pupils are equal, round, and reactive to light.   Neck:      Trachea: No tracheal deviation.   Cardiovascular:      Rate and Rhythm: Normal rate and regular rhythm.      Pulses: Normal pulses.   Pulmonary:      Effort: No respiratory distress.      Breath sounds: Rhonchi present. No wheezing or rales.   Chest:      Chest wall: No tenderness.   Abdominal:      General: Abdomen is flat.      Palpations: Abdomen is soft. There is no mass.      Tenderness: There is no abdominal tenderness.   Musculoskeletal:         General: No tenderness or signs of injury.      Right lower leg: No edema.      Left lower leg: No edema.   Skin:     Coloration: Skin is not jaundiced or  pale.      Findings: No petechiae, rash or wound.   Neurological:      Mental Status: She is alert.   Psychiatric:         Speech: Speech normal.         Thought Content: Thought content does not include homicidal or suicidal ideation.        DIAGNOSTIC RESULTS   LABS:  Labs Reviewed   COMPREHENSIVE METABOLIC PANEL - Abnormal       Result Value    Glucose 90      Sodium 134 (*)     Potassium 3.6      Chloride 98      Bicarbonate 27      Anion Gap 13      Urea Nitrogen 16      Creatinine 1.11 (*)     eGFR 57 (*)     Calcium 9.0      Albumin 4.2      Alkaline Phosphatase 51      Total Protein 6.6      AST 26      Bilirubin, Total 0.6      ALT 13     B-TYPE NATRIURETIC PEPTIDE - Normal    BNP 18      Narrative:        <100 pg/mL - Heart failure unlikely  100-299 pg/mL - Intermediate probability of acute heart                  failure exacerbation. Correlate with clinical                  context and patient history.    >=300 pg/mL - Heart Failure likely. Correlate with clinical                  context and patient history.    BNP testing is performed using different testing methodology at [facility] than at other [facility]. Direct result comparisons should only be made within the same method.      PROTIME-INR - Normal    Protime 11.7      INR 1.0     SERIAL TROPONIN-INITIAL - Normal    Troponin I, High Sensitivity 5      Narrative:     Less than 99th percentile of normal range cutoff-  Female and children under 18 years old <14 ng/L; Male <21 ng/L: Negative  Repeat testing should be performed if clinically indicated.     Female and children under 18 years old 14-50 ng/L; Male 21-50 ng/L:  Consistent with possible cardiac damage and possible increased clinical   risk. Serial measurements may help to assess extent of myocardial damage.     >50 ng/L: Consistent with cardiac damage, increased clinical risk and  myocardial infarction. Serial measurements may help assess extent of   myocardial damage.       NOTE: Children less than 1 year old may have higher baseline troponin   levels and results should be interpreted in conjunction with the overall   clinical context.     NOTE: Troponin I testing is performed using a different   testing methodology at [facility] than at other   [facility]. Direct result comparisons should only   be made within the same method.   SARS-COV-2 AND INFLUENZA A/B PCR - Normal    Flu A Result Not Detected      Flu B Result Not Detected      Coronavirus 2019, PCR Not Detected      Narrative:     This assay has received FDA Emergency Use Authorization (EUA) and  is only authorized for the duration of time that circumstances exist to justify the authorization of the emergency use of in vitro diagnostic tests for the detection of SARS-CoV-2 virus and/or diagnosis of COVID-19 infection under section 564(b)(1) of the Act, 21 U.S.C. 360bbb-3(b)(1). Testing for SARS-CoV-2 is only recommended for patients who meet current clinical and/or epidemiological criteria as defined by federal, state, or local public health directives. This assay is an in vitro diagnostic nucleic acid amplification test for the qualitative detection of SARS-CoV-2, Influenza A, and Influenza B from nasopharyngeal specimens and has been validated for use at [facility]. Negative results do not preclude COVID-19 infections or Influenza A/B infections, and should not be used as the sole basis for diagnosis, treatment, or other management decisions. If Influenza A/B and RSV PCR results are negative, testing for Parainfluenza virus, Adenovirus and Metapneumovirus is routinely performed for [facility] pediatric oncology and intensive care inpatients, and is available on other patients by placing an add-on request.    RSV PCR - Normal    RSV PCR Not Detected      Narrative:     This assay is an FDA-cleared, in vitro diagnostic nucleic acid amplification test for the detection of RSV from  nasopharyngeal specimens, and has been validated for use at [facility]. Negative results do not preclude RSV infections, and should not be used as the sole basis for diagnosis, treatment, or other management decisions. If Influenza A/B and RSV PCR results are negative, testing for Parainfluenza virus, Adenovirus and Metapneumovirus is routinely performed for pediatric oncology and intensive care inpatients at [facility], and is available on other patients by placing an add-on request.       SERIAL TROPONIN, 1 HOUR - Normal    Troponin I, High Sensitivity 5      Narrative:     Less than 99th percentile of normal range cutoff-  Female and children under 18 years old <14 ng/L; Male <21 ng/L: Negative  Repeat testing should be performed if clinically indicated.     Female and children under 18 years old 14-50 ng/L; Male 21-50 ng/L:  Consistent with possible cardiac damage and possible increased clinical   risk. Serial measurements may help to assess extent of myocardial damage.     >50 ng/L: Consistent with cardiac damage, increased clinical risk and  myocardial infarction. Serial measurements may help assess extent of   myocardial damage.      NOTE: Children less than 1 year old may have higher baseline troponin   levels and results should be interpreted in conjunction with the overall   clinical context.     NOTE: Troponin I testing is performed using a different   testing methodology at [facility] than at other   [facility]. Direct result comparisons should only   be made within the same method.   CBC WITH AUTO DIFFERENTIAL    WBC 5.8      nRBC 0.0      RBC 4.92      Hemoglobin 14.0      Hematocrit 41.9      MCV 85      MCH 28.5      MCHC 33.4      RDW 13.9      Platelets 167      Neutrophils % 65.6      Immature Granulocytes %, Automated 0.2      Lymphocytes % 22.6      Monocytes % 8.5      Eosinophils % 2.8      Basophils % 0.3      Neutrophils Absolute 3.80      Immature  Granulocytes Absolute, Automated 0.01      Lymphocytes Absolute 1.31      Monocytes Absolute 0.49      Eosinophils Absolute 0.16      Basophils Absolute 0.02     TROPONIN SERIES- (INITIAL, 1 HR)    Narrative:     The following orders were created for panel order Troponin I Series, High Sensitivity (0, 1 HR).  Procedure                               Abnormality         Status                     ---------                               -----------         ------                     Troponin I, High Sensiti...[[id number]]  Normal              Final result               Troponin, High Sensitivi...[[id number]]  Normal              Final result                 Please view results for these tests on the individual orders.     All other labs were within normal range or not returned as of this dictation.  Imaging  CT angio chest for pulmonary embolism   Final Result   1. No acute pulmonary embolism to the segmental arterial level.   2. Subtle nodular opacities bilaterally as described above suspicious   for atypical infectious/inflammatory process. Correlate clinically   and continued follow-up low-dose chest CT in 3 months is suggested to   reassess and exclude discrete nodule.   3. Postsurgical changes of right partial nephrectomy, incompletely   imaged.   4. Cholelithiasis.   5. Additional findings as described above.             MACRO:   None        Signed by: Geni Jama 1/12/2025 7:10 PM   Dictation workstation:   [id number]      XR chest 1 view   Final Result   1. No acute cardiopulmonary process.        MACRO:   None.        Signed by: Yue Sheppard 1/12/2025 5:17 PM   Dictation workstation:   [id number]         Procedure  Procedures  EMERGENCY DEPARTMENT COURSE/MDM:   Medical Decision Making  =================Attending note===============    The patient was seen by the resident/fellow.  I have personally performed a substantive portion of the encounter.  I have seen and examined the patient; agree with the  workup, evaluation, MDM,   management and diagnosis.  The care plan has been discussed with the resident; I have reviewed the resident's note and agree with the documented findings.      This is a 59 y.o. female who presents to ER with cough since Tuesday, wheezing since Thursday, sore throat.  She has had no fevers but she has had some chills.  No chest pains.  No sick contacts.  No nausea or vomiting or diarrhea.  She states had a similar episode in June.  States that when she was diagnosed with her clear-cell carcinoma of her kidney.  She states they incidentally found on the CT scan of her lungs.  She had to start taking Keytruda for the first time January 3.  Patient has a history of a partial nephrectomy in October.  She is not on blood thinners.  She was never on radiation therapy.    Heart is regular.  Lungs are are coarse with a very fine rhonchi versus friction rub.  No crackles.  No significant wheezing.  No increased work of breathing.  She does have a coarse cough.    DuoNebs are ordered    Chemistry shows a GFR of 57.  Troponin negative.  BNP negative.  INR is 1.0.  CBC is unremarkable.  RSV is negative.  COVID and flu are negative.    Patient states her blood pressures are always significantly elevated when she is in the emergency department.  She states it typically goes up to around 200.  She states that her blood pressure is minimally elevated when she is at home.  She is not on any blood pressure medications.  She is advised to follow with blood pressure up with her doctor.  She is not having any symptoms of hypertensive emergency.    EKG: Normal sinus rhythm with a ventricular rate of 89.  .  QTc 457.  No ST changes.    CT:      1. No acute pulmonary embolism to the segmental arterial level.  2. Subtle nodular opacities bilaterally as described above suspicious  for atypical infectious/inflammatory process. Correlate clinically  and continued follow-up low-dose chest CT in 3 months is  "suggested to  reassess and exclude discrete nodule.  3. Postsurgical changes of right partial nephrectomy, incompletely  imaged.  4. Cholelithiasis.  5. Additional findings as described above.    Patient is given copy of the CT report to take with her.  She is made aware of the nodules that need followed up.  She is already set up with oncology.    Repeat EKG: Normal sinus rhythm with a ventricular rate of 93.  .  QTc 460.  No ST changes.    Patient is comfortably discharged home.  She is discharged home with Augmentin and azithromycin.  ==========================================        Vitals:    Vitals:    01/12/25 1446 01/12/25 1718 01/12/25 1730   BP: 178/86  (!) 208/95   BP Location: Right arm     Patient Position: Sitting     Pulse: 89  91   Resp: 18  20   Temp: 36.2 °C (97.2 °F)     TempSrc: Temporal     SpO2: 95% 97% 98%   Weight: 97.9 kg (215 lb 13.3 oz)     Height: 1.626 m (5' 4\")       Kika Marcelino is a female 59 y.o. who presents to the ER for cough and shortness of breath. On arrival the patients vital signs were: Afebrile, regular heart rate, normotensive, regular respiration rate, normoxic on room air. History obtained from: patient.  Given cough, shortness of breath, malignancy history, recent Keytruda initiation plan for cardiac workup with CT PE.  No hypotension or chest pain, chest attack not indicated.  Will provide DuoNeb for symptomatic relief of shortness of breath.  ED Course as of 01/12/25 1942   Sun Jan 12, 2025   1612 RSV PCR: Not Detected [CB]   1612 Flu A Result: Not Detected [CB]   1612 Flu B Result: Not Detected [CB]   1612 Coronavirus 2019, PCR: Not Detected [CB]   1813 BNP: 18  Not elevated doubt CHF exacerbation [CB]   1813 Troponin I, High Sensitivity: 5  Not elevated doubt acs or myopericarditis [CB]   1813 Troponin I, High Sensitivity: 5  Not elevated up to 15 points relative to initial troponin, doubt doubt acs or myopericarditis [CB]   1813 XR chest 1 view  1. No acute " cardiopulmonary process.       [CB]   1840 BP(!): 208/95  Patient does report a history of whitecoat hypertension, will treat with labetalol [CB]   1923 CT angio chest for pulmonary embolism  1. No acute pulmonary embolism to the segmental arterial level.  2. Subtle nodular opacities bilaterally as described above suspicious  for atypical infectious/inflammatory process. Correlate clinically  and continued follow-up low-dose chest CT in 3 months is suggested to  reassess and exclude discrete nodule.  3. Postsurgical changes of right partial nephrectomy, incompletely  imaged.  4. Cholelithiasis.  5. Additional findings as described above.   [CB]      ED Course User Index  [CB] Michele Jesus,          Diagnoses as of 01/12/25 1942   Pneumonia of both lungs due to infectious organism, unspecified part of lung   Pneumonia due to infectious organism, unspecified laterality, unspecified part of lung       External Records Reviewed: I reviewed recent and relevant outside records including inpatient notes, outpatient records  Prescription Drug Consideration: Augmentin and azithromycin started for outpatient management of community-acquired pneumonia with malignant comorbidity    Shared decision making for disposition  Patient and/or patient´s representative was counseled regarding labs, imaging, likely diagnosis. All questions were answered. Recommendation was made   for discharge home. The patient agreed and was discharged home in stable condition with appropriate relevant educational materials. Return precautions were provided.     ED Medications administered this visit:    Medications   amoxicillin-pot clavulanate (Augmentin) 875-125 mg per tablet 1 tablet (has no administration in time range)   azithromycin (Zithromax) tablet 500 mg (has no administration in time range)   ipratropium-albuteroL (Duo-Neb) 0.5-2.5 mg/3 mL nebulizer solution 3 mL (3 mL nebulization Given 1/12/25 1717)   iohexol (OMNIPaque) 350 mg  iodine/mL solution 60 mL (60 mL intravenous Given 1/12/25 1824)   labetaloL (Normodyne,Trandate) injection 10 mg (10 mg intravenous Given 1/12/25 1836)       New Prescriptions from this visit:    New Prescriptions    AMOXICILLIN-POT CLAVULANATE (AUGMENTIN) 875-125 MG TABLET    Take 1 tablet by mouth every 12 hours for 5 days.    AZITHROMYCIN (ZITHROMAX) 250 MG TABLET    Take one tablet (250 mg) by mouth daily for 4 days.       Follow-up:  Rashaad Palma MD  [id number]  [place] [address]  [phone]    In 1 day          Final Impression:   1. Pneumonia of both lungs due to infectious organism, unspecified part of lung    2. Pneumonia due to infectious organism, unspecified laterality, unspecified part of lung          Please excuse any misspellings or unintended errors related to the Dragon speech recognition software used to dictate this note.    I reviewed the case with the attending ED physician. The attending ED physician agrees with the plan.      Michele Jesus,   Resident  01/12/25 1942       Cecilio Hastings,   01/12/25 1945       Cecilio Hastings,   01/13/25 0004

## 2025-01-13 LAB
ATRIAL RATE: 89 BPM
ATRIAL RATE: 93 BPM
P AXIS: 60 DEGREES
P AXIS: 68 DEGREES
P OFFSET: 194 MS
P OFFSET: 196 MS
P ONSET: 142 MS
P ONSET: 143 MS
PR INTERVAL: 156 MS
PR INTERVAL: 162 MS
Q ONSET: 221 MS
Q ONSET: 223 MS
QRS COUNT: 15 BEATS
QRS COUNT: 15 BEATS
QRS DURATION: 76 MS
QRS DURATION: 86 MS
QT INTERVAL: 370 MS
QT INTERVAL: 376 MS
QTC CALCULATION(BAZETT): 457 MS
QTC CALCULATION(BAZETT): 460 MS
QTC FREDERICIA: 428 MS
QTC FREDERICIA: 428 MS
R AXIS: 107 DEGREES
R AXIS: 156 DEGREES
T AXIS: 13 DEGREES
T AXIS: 7 DEGREES
T OFFSET: 408 MS
T OFFSET: 409 MS
VENTRICULAR RATE: 89 BPM
VENTRICULAR RATE: 93 BPM

## 2025-01-13 NOTE — DISCHARGE INSTRUCTIONS
Seek immediate medical attention if you develop: worsening cough, shortness of breath, difficulty breathing, chest pain, nausea, vomiting, weakness, numbness, tingling, excessive sweating, loss of motion in your arms or legs, or any new or worsening symptoms.    Follow up your elevated blood pressure with your doctors.

## 2025-01-15 ENCOUNTER — TELEPHONE (OUTPATIENT)
Dept: PRIMARY CARE | Facility: CLINIC | Age: 60
End: 2025-01-15
Payer: COMMERCIAL

## 2025-01-15 DIAGNOSIS — R91.8 ABNORMAL CT SCAN OF LUNG: Primary | ICD-10-CM

## 2025-01-15 NOTE — TELEPHONE ENCOUNTER
To  have CT chest  in 3 months per dr Palma today, sent pat MSG in chart  along with the  phone number  to call and get help scheduling lito for CT  to be done 4.2025

## 2025-01-22 ENCOUNTER — TELEMEDICINE (OUTPATIENT)
Dept: HEMATOLOGY/ONCOLOGY | Facility: HOSPITAL | Age: 60
End: 2025-01-22
Payer: COMMERCIAL

## 2025-01-22 DIAGNOSIS — C64.1 RENAL CELL CANCER, RIGHT (MULTI): Primary | ICD-10-CM

## 2025-01-22 PROCEDURE — 99215 OFFICE O/P EST HI 40 MIN: CPT | Performed by: NURSE PRACTITIONER

## 2025-01-22 RX ORDER — PROCHLORPERAZINE MALEATE 10 MG
10 TABLET ORAL EVERY 6 HOURS PRN
Status: CANCELLED | OUTPATIENT
Start: 2025-01-24

## 2025-01-22 RX ORDER — FAMOTIDINE 10 MG/ML
20 INJECTION INTRAVENOUS ONCE AS NEEDED
Status: CANCELLED | OUTPATIENT
Start: 2025-01-24

## 2025-01-22 RX ORDER — ALBUTEROL SULFATE 0.83 MG/ML
3 SOLUTION RESPIRATORY (INHALATION) AS NEEDED
Status: CANCELLED | OUTPATIENT
Start: 2025-01-24

## 2025-01-22 RX ORDER — DIPHENHYDRAMINE HYDROCHLORIDE 50 MG/ML
50 INJECTION INTRAMUSCULAR; INTRAVENOUS AS NEEDED
Status: CANCELLED | OUTPATIENT
Start: 2025-01-24

## 2025-01-22 RX ORDER — EPINEPHRINE 0.3 MG/.3ML
0.3 INJECTION SUBCUTANEOUS EVERY 5 MIN PRN
Status: CANCELLED | OUTPATIENT
Start: 2025-01-24

## 2025-01-22 RX ORDER — PROCHLORPERAZINE EDISYLATE 5 MG/ML
10 INJECTION INTRAMUSCULAR; INTRAVENOUS EVERY 6 HOURS PRN
Status: CANCELLED | OUTPATIENT
Start: 2025-01-24

## 2025-01-22 NOTE — PROGRESS NOTES
Patient ID: Kika Marcelino is a 59 y.o. female.  Attending Physician: Dr. Cecilio Finnegan  Cancer Diagnosis:  Cancer Staging   Renal cell cancer, right (Multi)  Staging form: Kidney, AJCC 8th Edition  - Clinical stage from 10/9/2024: Stage III (ycT3a, cN0, cM0) - Signed by Gerard Nixon MD on 11/5/2024     Current Therapy: Adjuvant Pembrolizumab  200 mg IV day 1  21-day cycles  Genetics: N/A    Subjective      Cancer History:  Oncology History   Renal cell cancer, right (Multi)   8/13/2024 Initial Diagnosis    Renal cell cancer, right (Multi)     10/9/2024 Cancer Staged    Staging form: Kidney, AJCC 8th Edition, Clinical stage from 10/9/2024: Stage III (ycT3a, cN0, cM0) - Signed by Gerard Nixon MD on 11/5/2024     1/3/2025 -  Chemotherapy    Pembrolizumab, 21 Day Cycles       6/27/24 Admitted for COVID PNA, incidental renal mass found    6/28/24 CT kidney shows R renal lesion     10/1/24 MR kidney - 3.9 cm R renal mass     10/9/24 R partial nephrectomy - 3.9 cm ccRCC of R kidney, grade 3. Invade segmental vein. Margins negative. Staged pT3aNX     10/23/24 CT chest without metastasis, resolution of previously identified infiltrate    1/3/25  Cycle 1 Day 1 Adjuvant Pembrolizumab    Interval History:  Ms. Marcelino presents today in follow up and consideration of cycle 2 pembro. She noticed shortly after her first infusion she had random and occasional itching, more on her face/chest. No rashes or skin changes. Not bad enough to warrant medication and improved. She didn't notice any other changes. She went to the ED on Sunday due to wheezing and was diagnosed with PNA. She took augmentin and azithro and has since felt a lot better. Cough has significantly improved. Voice a bit hoarse, bu otherwise improved. No other new or concerning symptoms. The remainder of her ROS is otherwise negative.    HPI    Objective    BSA: There is no height or weight on file to calculate BSA.  LMP  (LMP Unknown) Comment: more than ten years  ago    Physical Exam  PHYSICAL EXAM:   General: alert, well-dressed in NAD. Speech is fluent and coherent, words clear. Good insight. Oriented x4  Skin: warm, dry, and pink without cyanosis or nail clubbing. No rash, petechiae, or ecchymoses  HEENT: Normocephalic atraumatic. Sclera white, conjunctiva pink. EOMs intact. Hearing intact to spoken voice. No visible lesions  Respiratory: Chest expansion symmetric. No audible wheeze. Unlabored breathing.  CV: Good color  Psych: engaged, polite, appropriate conversation and eye contact.    Current Medications:    Current Outpatient Medications:     azithromycin (Zithromax) 250 mg tablet, Take one tablet (250 mg) by mouth daily for 4 days., Disp: 4 tablet, Rfl: 0     Most Recent Labs:  Results for orders placed or performed during the hospital encounter of 01/12/25   ECG 12 lead    Collection Time: 01/12/25  3:02 PM   Result Value Ref Range    Ventricular Rate 89 BPM    Atrial Rate 89 BPM    MN Interval 156 ms    QRS Duration 76 ms    QT Interval 376 ms    QTC Calculation(Bazett) 457 ms    P Axis 60 degrees    R Axis 107 degrees    T Axis 7 degrees    QRS Count 15 beats    Q Onset 221 ms    P Onset 143 ms    P Offset 196 ms    T Offset 409 ms    QTC Fredericia 428 ms   Sars-CoV-2 and Influenza A/B PCR    Collection Time: 01/12/25  3:03 PM   Result Value Ref Range    Flu A Result Not Detected Not Detected    Flu B Result Not Detected Not Detected    Coronavirus 2019, PCR Not Detected Not Detected   RSV PCR    Collection Time: 01/12/25  3:03 PM   Result Value Ref Range    RSV PCR Not Detected Not Detected   CBC with Differential    Collection Time: 01/12/25  4:05 PM   Result Value Ref Range    WBC 5.8 4.4 - 11.3 x10*3/uL    nRBC 0.0 0.0 - 0.0 /100 WBCs    RBC 4.92 4.00 - 5.20 x10*6/uL    Hemoglobin 14.0 12.0 - 16.0 g/dL    Hematocrit 41.9 36.0 - 46.0 %    MCV 85 80 - 100 fL    MCH 28.5 26.0 - 34.0 pg    MCHC 33.4 32.0 - 36.0 g/dL    RDW 13.9 11.5 - 14.5 %    Platelets 167 150  - 450 x10*3/uL    Neutrophils % 65.6 40.0 - 80.0 %    Immature Granulocytes %, Automated 0.2 0.0 - 0.9 %    Lymphocytes % 22.6 13.0 - 44.0 %    Monocytes % 8.5 2.0 - 10.0 %    Eosinophils % 2.8 0.0 - 6.0 %    Basophils % 0.3 0.0 - 2.0 %    Neutrophils Absolute 3.80 1.20 - 7.70 x10*3/uL    Immature Granulocytes Absolute, Automated 0.01 0.00 - 0.70 x10*3/uL    Lymphocytes Absolute 1.31 1.20 - 4.80 x10*3/uL    Monocytes Absolute 0.49 0.10 - 1.00 x10*3/uL    Eosinophils Absolute 0.16 0.00 - 0.70 x10*3/uL    Basophils Absolute 0.02 0.00 - 0.10 x10*3/uL   Comprehensive Metabolic Panel    Collection Time: 01/12/25  4:05 PM   Result Value Ref Range    Glucose 90 74 - 99 mg/dL    Sodium 134 (L) 136 - 145 mmol/L    Potassium 3.6 3.5 - 5.3 mmol/L    Chloride 98 98 - 107 mmol/L    Bicarbonate 27 21 - 32 mmol/L    Anion Gap 13 10 - 20 mmol/L    Urea Nitrogen 16 6 - 23 mg/dL    Creatinine 1.11 (H) 0.50 - 1.05 mg/dL    eGFR 57 (L) >60 mL/min/1.73m*2    Calcium 9.0 8.6 - 10.3 mg/dL    Albumin 4.2 3.4 - 5.0 g/dL    Alkaline Phosphatase 51 33 - 110 U/L    Total Protein 6.6 6.4 - 8.2 g/dL    AST 26 9 - 39 U/L    Bilirubin, Total 0.6 0.0 - 1.2 mg/dL    ALT 13 7 - 45 U/L   Brain Natriuretic Peptide    Collection Time: 01/12/25  4:05 PM   Result Value Ref Range    BNP 18 0 - 99 pg/mL   Protime-INR    Collection Time: 01/12/25  4:05 PM   Result Value Ref Range    Protime 11.7 9.8 - 12.8 seconds    INR 1.0 0.9 - 1.1   Troponin I, High Sensitivity, Initial    Collection Time: 01/12/25  4:05 PM   Result Value Ref Range    Troponin I, High Sensitivity 5 0 - 13 ng/L   Troponin, High Sensitivity, 1 Hour    Collection Time: 01/12/25  4:58 PM   Result Value Ref Range    Troponin I, High Sensitivity 5 0 - 13 ng/L   ECG 12 lead    Collection Time: 01/12/25  5:43 PM   Result Value Ref Range    Ventricular Rate 93 BPM    Atrial Rate 93 BPM    VA Interval 162 ms    QRS Duration 86 ms    QT Interval 370 ms    QTC Calculation(Bazett) 460 ms    P Axis 68  "degrees    R Axis 156 degrees    T Axis 13 degrees    QRS Count 15 beats    Q Onset 223 ms    P Onset 142 ms    P Offset 194 ms    T Offset 408 ms    QTC Fredericia 428 ms      No results found for: \"PSA\"     Performance Status:  ECOG Score: 0- Fully active, able to carry on all pre-disease performance w/o restriction.  Karnofsky Score: 100 - Fully active, able to carry on all pre-disease performed without restriction      Assessment/Plan   Kika Marcelino is a 59 y.o. female with a history of RCC who presents in follow up on adjuvant pembrolizumab.     She was diagnosed with stage III clear cell renal cell carcinoma ([id number]) and underwent right partial nephrectomy with negative margins in October 2024. She was recommended to start adjuvant pembrolizumab.    She presents via video visit for cycle 2. Looks and feels well aside from recent PNA, which is significantly improving. Discussed if continuing to improve and near baseline, OK to proceed. If worsening or feeling poorly, can cancel and push back treatment to next week. She will have labs today.    # Stage III clear cell renal cell carcinoma  - Follows with urology (Dr. Nixon)  - Cycle 2 pembro Friday  - Next imaging scheduled Feb 2025    # PNA  - Finished abx  - Monitor     # Tobacco Use  - Encourage cessation    RTC 3 weeks with labs    Total time spent on this encounter was 40 minutes, which included preparation, direct time with patient, documentation, and care coordination on the day of visit.    Savita Schreiber, MSN, APRN, AGNP-C, AOCNP  Associate Nurse Practitioner  [facility]   "

## 2025-01-23 ENCOUNTER — LAB (OUTPATIENT)
Dept: LAB | Facility: CLINIC | Age: 60
End: 2025-01-23
Payer: COMMERCIAL

## 2025-01-23 DIAGNOSIS — C64.1 RENAL CELL CANCER, RIGHT (MULTI): ICD-10-CM

## 2025-01-23 LAB
ALBUMIN SERPL BCP-MCNC: 4.2 G/DL (ref 3.4–5)
ALP SERPL-CCNC: 52 U/L (ref 33–110)
ALT SERPL W P-5'-P-CCNC: 11 U/L (ref 7–45)
ANION GAP SERPL CALC-SCNC: 13 MMOL/L (ref 10–20)
AST SERPL W P-5'-P-CCNC: 17 U/L (ref 9–39)
BASOPHILS # BLD AUTO: 0.03 X10*3/UL (ref 0–0.1)
BASOPHILS NFR BLD AUTO: 0.4 %
BILIRUB SERPL-MCNC: 0.7 MG/DL (ref 0–1.2)
BUN SERPL-MCNC: 18 MG/DL (ref 6–23)
CALCIUM SERPL-MCNC: 9.2 MG/DL (ref 8.6–10.3)
CHLORIDE SERPL-SCNC: 106 MMOL/L (ref 98–107)
CO2 SERPL-SCNC: 25 MMOL/L (ref 21–32)
CREAT SERPL-MCNC: 0.96 MG/DL (ref 0.5–1.05)
EGFRCR SERPLBLD CKD-EPI 2021: 68 ML/MIN/1.73M*2
EOSINOPHIL # BLD AUTO: 0.25 X10*3/UL (ref 0–0.7)
EOSINOPHIL NFR BLD AUTO: 3.4 %
ERYTHROCYTE [DISTWIDTH] IN BLOOD BY AUTOMATED COUNT: 13.5 % (ref 11.5–14.5)
GLUCOSE SERPL-MCNC: 107 MG/DL (ref 74–99)
HCT VFR BLD AUTO: 43.4 % (ref 36–46)
HGB BLD-MCNC: 14.5 G/DL (ref 12–16)
IMM GRANULOCYTES # BLD AUTO: 0.03 X10*3/UL (ref 0–0.7)
IMM GRANULOCYTES NFR BLD AUTO: 0.4 % (ref 0–0.9)
LYMPHOCYTES # BLD AUTO: 1.79 X10*3/UL (ref 1.2–4.8)
LYMPHOCYTES NFR BLD AUTO: 24.3 %
MCH RBC QN AUTO: 27.9 PG (ref 26–34)
MCHC RBC AUTO-ENTMCNC: 33.4 G/DL (ref 32–36)
MCV RBC AUTO: 84 FL (ref 80–100)
MONOCYTES # BLD AUTO: 0.62 X10*3/UL (ref 0.1–1)
MONOCYTES NFR BLD AUTO: 8.4 %
NEUTROPHILS # BLD AUTO: 4.64 X10*3/UL (ref 1.2–7.7)
NEUTROPHILS NFR BLD AUTO: 63.1 %
PLATELET # BLD AUTO: 172 X10*3/UL (ref 150–450)
POTASSIUM SERPL-SCNC: 4 MMOL/L (ref 3.5–5.3)
PROT SERPL-MCNC: 7.1 G/DL (ref 6.4–8.2)
RBC # BLD AUTO: 5.19 X10*6/UL (ref 4–5.2)
SODIUM SERPL-SCNC: 140 MMOL/L (ref 136–145)
WBC # BLD AUTO: 7.4 X10*3/UL (ref 4.4–11.3)

## 2025-01-23 PROCEDURE — 80053 COMPREHEN METABOLIC PANEL: CPT

## 2025-01-23 PROCEDURE — 85025 COMPLETE CBC W/AUTO DIFF WBC: CPT

## 2025-01-23 PROCEDURE — 36415 COLL VENOUS BLD VENIPUNCTURE: CPT

## 2025-01-24 ENCOUNTER — INFUSION (OUTPATIENT)
Dept: HEMATOLOGY/ONCOLOGY | Facility: CLINIC | Age: 60
End: 2025-01-24
Payer: COMMERCIAL

## 2025-01-24 VITALS
BODY MASS INDEX: 36.93 KG/M2 | DIASTOLIC BLOOD PRESSURE: 100 MMHG | OXYGEN SATURATION: 96 % | TEMPERATURE: 96.6 F | RESPIRATION RATE: 16 BRPM | HEART RATE: 90 BPM | SYSTOLIC BLOOD PRESSURE: 150 MMHG | WEIGHT: 215.17 LBS

## 2025-01-24 DIAGNOSIS — C64.1 RENAL CELL CANCER, RIGHT (MULTI): ICD-10-CM

## 2025-01-24 PROCEDURE — 2500000004 HC RX 250 GENERAL PHARMACY W/ HCPCS (ALT 636 FOR OP/ED): Mod: JZ,TB | Performed by: NURSE PRACTITIONER

## 2025-01-24 PROCEDURE — 96413 CHEMO IV INFUSION 1 HR: CPT

## 2025-01-24 RX ORDER — PROCHLORPERAZINE EDISYLATE 5 MG/ML
10 INJECTION INTRAMUSCULAR; INTRAVENOUS EVERY 6 HOURS PRN
Status: DISCONTINUED | OUTPATIENT
Start: 2025-01-24 | End: 2025-01-24 | Stop reason: HOSPADM

## 2025-01-24 RX ORDER — HEPARIN 100 UNIT/ML
500 SYRINGE INTRAVENOUS AS NEEDED
Status: CANCELLED | OUTPATIENT
Start: 2025-01-24

## 2025-01-24 RX ORDER — HEPARIN SODIUM,PORCINE/PF 10 UNIT/ML
50 SYRINGE (ML) INTRAVENOUS AS NEEDED
OUTPATIENT
Start: 2025-01-24

## 2025-01-24 RX ORDER — ALBUTEROL SULFATE 0.83 MG/ML
3 SOLUTION RESPIRATORY (INHALATION) AS NEEDED
Status: DISCONTINUED | OUTPATIENT
Start: 2025-01-24 | End: 2025-01-24 | Stop reason: HOSPADM

## 2025-01-24 RX ORDER — HEPARIN SODIUM,PORCINE/PF 10 UNIT/ML
50 SYRINGE (ML) INTRAVENOUS AS NEEDED
Status: CANCELLED | OUTPATIENT
Start: 2025-01-24

## 2025-01-24 RX ORDER — HEPARIN 100 UNIT/ML
500 SYRINGE INTRAVENOUS AS NEEDED
OUTPATIENT
Start: 2025-01-24

## 2025-01-24 RX ORDER — EPINEPHRINE 0.3 MG/.3ML
0.3 INJECTION SUBCUTANEOUS EVERY 5 MIN PRN
Status: DISCONTINUED | OUTPATIENT
Start: 2025-01-24 | End: 2025-01-24 | Stop reason: HOSPADM

## 2025-01-24 RX ORDER — PROCHLORPERAZINE MALEATE 10 MG
10 TABLET ORAL EVERY 6 HOURS PRN
Status: DISCONTINUED | OUTPATIENT
Start: 2025-01-24 | End: 2025-01-24 | Stop reason: HOSPADM

## 2025-01-24 RX ORDER — DIPHENHYDRAMINE HYDROCHLORIDE 50 MG/ML
50 INJECTION INTRAMUSCULAR; INTRAVENOUS AS NEEDED
Status: DISCONTINUED | OUTPATIENT
Start: 2025-01-24 | End: 2025-01-24 | Stop reason: HOSPADM

## 2025-01-24 RX ORDER — FAMOTIDINE 10 MG/ML
20 INJECTION INTRAVENOUS ONCE AS NEEDED
Status: DISCONTINUED | OUTPATIENT
Start: 2025-01-24 | End: 2025-01-24 | Stop reason: HOSPADM

## 2025-01-24 RX ADMIN — SODIUM CHLORIDE 200 MG: 9 INJECTION, SOLUTION INTRAVENOUS at 09:55

## 2025-01-24 ASSESSMENT — PAIN SCALES - GENERAL: PAINLEVEL_OUTOF10: 0-NO PAIN

## 2025-01-24 NOTE — PATIENT INSTRUCTIONS
Dr Finnegan would like you to follow up with your Primary care doctor for blood pressure management.

## 2025-01-24 NOTE — PROGRESS NOTES
Patient's BP elevated today. 152/100 manually. Per Dr Finnegan, patient should follow up with her PCP for BP management.

## 2025-01-27 ASSESSMENT — PROMIS GLOBAL HEALTH SCALE
RATE_QUALITY_OF_LIFE: EXCELLENT
EMOTIONAL_PROBLEMS: SOMETIMES
RATE_MENTAL_HEALTH: VERY GOOD
CARRYOUT_PHYSICAL_ACTIVITIES: COMPLETELY
RATE_PHYSICAL_HEALTH: VERY GOOD
RATE_GENERAL_HEALTH: VERY GOOD
CARRYOUT_SOCIAL_ACTIVITIES: EXCELLENT
RATE_AVERAGE_FATIGUE: MILD
RATE_AVERAGE_PAIN: 0
RATE_SOCIAL_SATISFACTION: VERY GOOD

## 2025-02-03 ENCOUNTER — TELEPHONE (OUTPATIENT)
Dept: PRIMARY CARE | Facility: CLINIC | Age: 60
End: 2025-02-03

## 2025-02-03 ENCOUNTER — APPOINTMENT (OUTPATIENT)
Dept: PRIMARY CARE | Facility: CLINIC | Age: 60
End: 2025-02-03
Payer: COMMERCIAL

## 2025-02-03 ENCOUNTER — HOSPITAL ENCOUNTER (OUTPATIENT)
Dept: RADIOLOGY | Facility: HOSPITAL | Age: 60
Discharge: HOME | End: 2025-02-03
Payer: COMMERCIAL

## 2025-02-03 VITALS
BODY MASS INDEX: 36.57 KG/M2 | TEMPERATURE: 97.3 F | HEART RATE: 82 BPM | RESPIRATION RATE: 18 BRPM | SYSTOLIC BLOOD PRESSURE: 148 MMHG | DIASTOLIC BLOOD PRESSURE: 88 MMHG | WEIGHT: 214.2 LBS | HEIGHT: 64 IN

## 2025-02-03 DIAGNOSIS — Z00.00 ANNUAL PHYSICAL EXAM: Primary | ICD-10-CM

## 2025-02-03 DIAGNOSIS — J18.9 PRIMARY ATYPICAL PNEUMONIA: ICD-10-CM

## 2025-02-03 DIAGNOSIS — I10 PRIMARY HYPERTENSION: ICD-10-CM

## 2025-02-03 DIAGNOSIS — Z12.31 ENCOUNTER FOR SCREENING MAMMOGRAM FOR MALIGNANT NEOPLASM OF BREAST: ICD-10-CM

## 2025-02-03 DIAGNOSIS — Z72.0 SMOKING TRYING TO QUIT: ICD-10-CM

## 2025-02-03 DIAGNOSIS — N28.89 RIGHT RENAL MASS: ICD-10-CM

## 2025-02-03 DIAGNOSIS — Z12.11 SCREENING FOR COLON CANCER: ICD-10-CM

## 2025-02-03 DIAGNOSIS — C64.1 RENAL CELL CANCER, RIGHT (MULTI): ICD-10-CM

## 2025-02-03 DIAGNOSIS — J44.0 CHRONIC OBSTRUCTIVE PULMONARY DISEASE WITH (ACUTE) LOWER RESPIRATORY INFECTION (MULTI): ICD-10-CM

## 2025-02-03 DIAGNOSIS — E55.9 VITAMIN D DEFICIENCY: ICD-10-CM

## 2025-02-03 PROBLEM — R06.00 DYSPNEA: Status: RESOLVED | Noted: 2024-06-28 | Resolved: 2025-02-03

## 2025-02-03 PROBLEM — R06.02 SHORTNESS OF BREATH: Status: RESOLVED | Noted: 2024-06-28 | Resolved: 2025-02-03

## 2025-02-03 PROBLEM — J96.01 ACUTE HYPOXEMIC RESPIRATORY FAILURE (MULTI): Status: RESOLVED | Noted: 2024-06-28 | Resolved: 2025-02-03

## 2025-02-03 PROCEDURE — 74177 CT ABD & PELVIS W/CONTRAST: CPT

## 2025-02-03 PROCEDURE — 99396 PREV VISIT EST AGE 40-64: CPT | Performed by: INTERNAL MEDICINE

## 2025-02-03 PROCEDURE — 3077F SYST BP >= 140 MM HG: CPT | Performed by: INTERNAL MEDICINE

## 2025-02-03 PROCEDURE — 2550000001 HC RX 255 CONTRASTS: Performed by: UROLOGY

## 2025-02-03 PROCEDURE — 99214 OFFICE O/P EST MOD 30 MIN: CPT | Performed by: INTERNAL MEDICINE

## 2025-02-03 PROCEDURE — 3079F DIAST BP 80-89 MM HG: CPT | Performed by: INTERNAL MEDICINE

## 2025-02-03 PROCEDURE — 3008F BODY MASS INDEX DOCD: CPT | Performed by: INTERNAL MEDICINE

## 2025-02-03 RX ORDER — AMLODIPINE BESYLATE 2.5 MG/1
2.5 TABLET ORAL DAILY
Qty: 30 TABLET | Refills: 5 | Status: SHIPPED | OUTPATIENT
Start: 2025-02-03 | End: 2025-08-02

## 2025-02-03 RX ADMIN — IOHEXOL 75 ML: 350 INJECTION, SOLUTION INTRAVENOUS at 15:12

## 2025-02-03 ASSESSMENT — ENCOUNTER SYMPTOMS
CHILLS: 0
WHEEZING: 0
CHEST TIGHTNESS: 0
COUGH: 1
DYSURIA: 0
SHORTNESS OF BREATH: 0
CONFUSION: 0
WEAKNESS: 0
SORE THROAT: 0
SLEEP DISTURBANCE: 0
UNEXPECTED WEIGHT CHANGE: 0
NAUSEA: 0
JOINT SWELLING: 0
DIARRHEA: 0
ADENOPATHY: 0
ARTHRALGIAS: 0
ABDOMINAL PAIN: 0
FATIGUE: 1
CONSTIPATION: 0
PALPITATIONS: 0
DIZZINESS: 0
BRUISES/BLEEDS EASILY: 1
VOMITING: 0

## 2025-02-03 ASSESSMENT — PATIENT HEALTH QUESTIONNAIRE - PHQ9
1. LITTLE INTEREST OR PLEASURE IN DOING THINGS: NOT AT ALL
2. FEELING DOWN, DEPRESSED OR HOPELESS: NOT AT ALL
SUM OF ALL RESPONSES TO PHQ9 QUESTIONS 1 AND 2: 0

## 2025-02-03 NOTE — TELEPHONE ENCOUNTER
Per dr Palma  order,LMS  for  patient to call office back to explain  regarding starting  new rx  for HTN

## 2025-02-03 NOTE — PATIENT INSTRUCTIONS
Was nice seeing you today.  Continue same medication.  Have lab work done before next appointment if labs were ordered today.  Fu in 3 month.  Call/ contact our office with any concerns.    If you have labs or test done and you can't see the report in your chart or you didn't hear from us in 2 weeks after test/labs done , please, call our office for reports.  Please , do not assume that they were normal.    Any test results  and questions you might have , will be discussed at next visit -- please make sure to make a follow up appt after testing if reports are abnormal or you have questions.ACOG Screening Guidelines     The following health screening schedule is recommended by the American College of Obstetrics and Gynecology (ACOG). Some of these tests may be ordered or performed by your primary care doctor.    Pap test screening  The pap test looks at cells on the cervix (the opening from the vagina to the uterus) to look for cancer or pre-cancerous changes. These changes are caused by the human papillomavirus (HPV). Studies estimate that half of all women will test positive for this virus within 3 years of starting sexual activity. For young women with a normal immune system, 90% of HPV infections will resolve within 2 years. There is a vaccine available against some forms of HPV. This is recommended for girls and women age 9-45. For ages 9-14, two injections are given at 0 and 6 months. For ages 15-45, three injections are given at 0,2 and 6 months. Because this vaccine does not protect against all HPV types which can cause cervical cancer, women who received the vaccine still need pap tests.  Pap smear screening should be started at age 21. The pap test should be done every 3 years from age 21-29. From age 30-65, pap smears can be done every 5 years if HPV test is negative or every 3 years if HPV testing is not done. For women over the age of 65, ACOG recommends against screening women who have had adequate  prior screening and are not otherwise at high risk for cervical cancer. Women who have had a hysterectomy also do not need routine pap smear screening unless the pap smear was done for a cervical cancer or moderate to severe dysplasia.     Breast cancer screening  Mammogram should be performed every 1-2 years starting at age 40 and every year starting at age 50. Screening may be started earlier depending on family history.    Cholesterol screening  Lipid panel (cholesterol test) should be checked every 5 years starting at age 45.    Diabetes screening  Fasting glucose (blood sugar) test should be performed every 3 years starting at age 45.    Colorectal cancer screening  Starting at age 45, women should have a screening colonoscopy at least every 10 years. Screening may be started earlier depending on family history.    Thyroid screening  Thyroid function test (TSH) should be checked every 5 years starting at age 50.    Bone mineral density screening  All postmenopausal women age 65 and over and postmenopausal women with risk factors for osteoporosis should have a bone mineral density test performed. Risk factors include  race, family history of osteoporosis, personal history of fractures, poor nutrition, smoking, heavy alcohol use, early menopause, low calcium intake and low body weight. Certain medical conditions and long-term use of some medications may also increase risk.    Body max Index (BMI)    Your body mass index (BMI) is a measure of your body fat based on your weight and height. The number that is calculated will tell you if you fall into the normal, overweight or obese category.    BMI Table    Normal weight: BMI is between 19 and 24.9  Overweight: BMI is between 25 and 29.9  Obese: BMI is 30 and above   You need pap test, colonoscopy, mammogram, labs,  and vaccines , info given

## 2025-02-03 NOTE — PROGRESS NOTES
Subjective   Kika Marcelino is a 59 y.o. female who presents for Annual Exam (CPE) and Follow-up (3 months  follow  up ).  CPE  3 months  follow up   Had  PNM in jan 13 2025, went to ER , sent  home with TX - ATB  x 2  types ATB  - feeling  better , just little cough ,little chest congestion    Smoker    daily x   40 y ,1/4ppd  Mammogram- last  done  many years  ago and was Ok,per   patient  statement   Colonoscopy -never  done   CT  chest done  - 1.2025 to repeat  in 32 months- lito is  in 4.15.2025  Today   has lito to   [facility]  a CT abd,pelvis, chest  ordered by urologist   I discussed smoking hx/status that determined  that patient meets criteria for lung cancer screening with low dose CT scan,. Discussion included benefits and harms of screening, fu dx testing, over dx, false positive and radiation exposure.Also importance of adhering to annual lung cancer ct screening. Smoking cessation Intervention provided if pt is still smoking for about 6 to 10 minutes.  ct chest ordered  Low Dose CT Screening   This screening is recommended for patients who:  Are between the age of 50 to 77  Have a 20 pack-year smoking history (20 years of smoking a pack a day)  Are either a current smoker or have quit smoking within the last 15 years  Are in good health - no new cough or unexplained weight loss  Are willing to do the follow-up testing and treatment, if needed  Have not had a chest CT (CAT) scan in the last year     Has  imunotheraphy  every 3 weeks  for 17 times , just  started and had 2  already -IV      Review of Systems   Constitutional:  Positive for fatigue. Negative for chills and unexpected weight change.        Sometimes   HENT:  Negative for congestion, ear pain and sore throat.    Respiratory:  Positive for cough. Negative for chest tightness, shortness of breath and wheezing.    Cardiovascular:  Negative for palpitations and leg swelling.   Gastrointestinal:  Negative for abdominal pain, constipation, diarrhea,  "nausea and vomiting.   Genitourinary:  Negative for dysuria and urgency.   Musculoskeletal:  Negative for arthralgias and joint swelling.   Skin:  Negative for rash.   Neurological:  Negative for dizziness and weakness.   Hematological:  Negative for adenopathy. Bruises/bleeds easily.   Psychiatric/Behavioral:  Negative for confusion and sleep disturbance.    All other systems reviewed and are negative.      Objective   Physical Exam  Constitutional:       Appearance: Normal appearance.   HENT:      Head: Normocephalic and atraumatic.   Eyes:      Conjunctiva/sclera: Conjunctivae normal.   Neck:      Vascular: No carotid bruit.   Cardiovascular:      Rate and Rhythm: Normal rate and regular rhythm.      Heart sounds: No murmur heard.  Pulmonary:      Effort: No respiratory distress.      Breath sounds: No wheezing, rhonchi or rales.   Chest:      Chest wall: No tenderness.   Abdominal:      General: Bowel sounds are normal. There is no distension.      Palpations: Abdomen is soft. There is no mass.      Tenderness: There is no abdominal tenderness.   Musculoskeletal:         General: Normal range of motion.      Cervical back: Neck supple.   Lymphadenopathy:      Cervical: No cervical adenopathy.   Skin:     Coloration: Skin is not jaundiced.      Findings: No rash.   Neurological:      General: No focal deficit present.      Mental Status: She is alert and oriented to person, place, and time. Mental status is at baseline.      Motor: No weakness.      Gait: Gait normal.   Psychiatric:         Mood and Affect: Mood normal.         Behavior: Behavior normal.         Judgment: Judgment normal.       /88 (BP Location: Left arm, Patient Position: Sitting) Comment: pat has  white coat syndrome /per her  statement  Pulse 82   Temp 36.3 °C (97.3 °F)   Resp 18   Ht 1.626 m (5' 4\")   Wt 97.2 kg (214 lb 3.2 oz)   LMP  (LMP Unknown) Comment: more than ten years ago  BMI 36.77 kg/m²   Lab Results   Component Value " Date    WBC 7.4 01/23/2025    HGB 14.5 01/23/2025    HCT 43.4 01/23/2025    MCV 84 01/23/2025     01/23/2025     \  Lab Results   Component Value Date    GLUCOSE 107 (H) 01/23/2025    CALCIUM 9.2 01/23/2025     01/23/2025    K 4.0 01/23/2025    CO2 25 01/23/2025     01/23/2025    BUN 18 01/23/2025    CREATININE 0.96 01/23/2025     Lab Results   Component Value Date    ALT 11 01/23/2025    AST 17 01/23/2025    ALKPHOS 52 01/23/2025    BILITOT 0.7 01/23/2025     Lab Results   Component Value Date    TSH 1.30 12/17/2024         Assessment/Plan   Problem List Items Addressed This Visit       Primary atypical pneumonia     Recent pn, was on antb , feeling well         Smoking trying to quit    Chronic obstructive pulmonary disease with (acute) lower respiratory infection (Multi)     Recently dx with pn  at urgent care, was on antb , feels ok now,         Annual physical exam - Primary    Relevant Orders    Vitamin B12    Magnesium    Lipid Panel    Primary hypertension    Relevant Medications    amLODIPine (Norvasc) 2.5 mg tablet    Renal cell cancer, right (Multi)     Had sx  2024, started imunoterapy, doing well, fu with oncology.  To have ct today          Other Visit Diagnoses       Encounter for screening mammogram for malignant neoplasm of breast        Relevant Orders    BI mammo bilateral screening tomosynthesis    Screening for colon cancer        Relevant Orders    Colonoscopy Screening; Average Risk Patient    Vitamin D deficiency        Relevant Orders    Vitamin D 25-Hydroxy,Total (for eval of Vitamin D levels)

## 2025-02-10 ENCOUNTER — TELEMEDICINE (OUTPATIENT)
Dept: HEMATOLOGY/ONCOLOGY | Facility: HOSPITAL | Age: 60
End: 2025-02-10
Payer: COMMERCIAL

## 2025-02-10 DIAGNOSIS — C64.1 RENAL CELL CANCER, RIGHT (MULTI): Primary | ICD-10-CM

## 2025-02-10 PROCEDURE — 99215 OFFICE O/P EST HI 40 MIN: CPT | Performed by: NURSE PRACTITIONER

## 2025-02-10 RX ORDER — ALBUTEROL SULFATE 0.83 MG/ML
3 SOLUTION RESPIRATORY (INHALATION) AS NEEDED
Status: CANCELLED | OUTPATIENT
Start: 2025-02-14

## 2025-02-10 RX ORDER — FAMOTIDINE 10 MG/ML
20 INJECTION INTRAVENOUS ONCE AS NEEDED
Status: CANCELLED | OUTPATIENT
Start: 2025-02-14

## 2025-02-10 RX ORDER — PROCHLORPERAZINE MALEATE 10 MG
10 TABLET ORAL EVERY 6 HOURS PRN
Status: CANCELLED | OUTPATIENT
Start: 2025-02-14

## 2025-02-10 RX ORDER — DIPHENHYDRAMINE HYDROCHLORIDE 50 MG/ML
50 INJECTION INTRAMUSCULAR; INTRAVENOUS AS NEEDED
Status: CANCELLED | OUTPATIENT
Start: 2025-02-14

## 2025-02-10 RX ORDER — EPINEPHRINE 0.3 MG/.3ML
0.3 INJECTION SUBCUTANEOUS EVERY 5 MIN PRN
Status: CANCELLED | OUTPATIENT
Start: 2025-02-14

## 2025-02-10 RX ORDER — PROCHLORPERAZINE EDISYLATE 5 MG/ML
10 INJECTION INTRAMUSCULAR; INTRAVENOUS EVERY 6 HOURS PRN
Status: CANCELLED | OUTPATIENT
Start: 2025-02-14

## 2025-02-10 NOTE — PROGRESS NOTES
Patient ID: Kika Marcelino is a 59 y.o. female.  Attending Physician: Dr. Cecilio Finnegan  Cancer Diagnosis:  Cancer Staging   Renal cell cancer, right (Multi)  Staging form: Kidney, AJCC 8th Edition  - Clinical stage from 10/9/2024: Stage III (ycT3a, cN0, cM0) - Signed by Gerard Nixon MD on 11/5/2024     Current Therapy: Adjuvant Pembrolizumab  200 mg IV day 1  21-day cycles    Genetics: N/A    Subjective      Cancer History:  Oncology History   Renal cell cancer, right (Multi)   8/13/2024 Initial Diagnosis    Renal cell cancer, right (Multi)     10/9/2024 Cancer Staged    Staging form: Kidney, AJCC 8th Edition, Clinical stage from 10/9/2024: Stage III (ycT3a, cN0, cM0) - Signed by Gerard Nixon MD on 11/5/2024     1/3/2025 -  Chemotherapy    Pembrolizumab, 21 Day Cycles       6/27/24 Admitted for COVID PNA, incidental renal mass found    6/28/24 CT kidney shows R renal lesion     10/1/24 MR kidney - 3.9 cm R renal mass     10/9/24 R partial nephrectomy - 3.9 cm ccRCC of R kidney, grade 3. Invade segmental vein. Margins negative. Staged pT3aNX     10/23/24 CT chest without metastasis, resolution of previously identified infiltrate    1/3/25  Cycle 1 Day 1 Adjuvant Pembrolizumab    1/24/25 Cycle 2 day 1 Pembro    2/14/25 Cycle 3 Pembro     Interval History:  Ms. Marcelino presents today in follow up and consideration of cycle 3. She feels well. Was recommended to get Shingles, TDAP, and PNA vaccines by PCP. She hasn't had recurrence of the itching. No new or concerning symptoms. Cough is resolving, still there. The remainder of her ROS is otherwise negative.  HPI    Objective    BSA: There is no height or weight on file to calculate BSA.  LMP  (LMP Unknown) Comment: more than ten years ago    Physical Exam  General: alert, well-dressed in NAD. Speech is fluent and coherent, words clear. Good insight. Oriented x4  Skin: warm, dry, and pink without cyanosis or nail clubbing. No rash, petechiae, or ecchymoses  HEENT:  Normocephalic atraumatic. Sclera white, conjunctiva pink. EOMs intact. Hearing intact to spoken voice. No visible lesions  Respiratory: Chest expansion symmetric. No audible wheeze. Unlabored breathing.  CV: Good color  Psych: engaged, polite, appropriate conversation and eye contact.    Current Medications:    Current Outpatient Medications:     amLODIPine (Norvasc) 2.5 mg tablet, Take 1 tablet (2.5 mg) by mouth once daily., Disp: 30 tablet, Rfl: 5     Most Recent Labs:  Results for orders placed or performed in visit on 01/23/25   CBC and Auto Differential    Collection Time: 01/23/25  8:38 AM   Result Value Ref Range    WBC 7.4 4.4 - 11.3 x10*3/uL    RBC 5.19 4.00 - 5.20 x10*6/uL    Hemoglobin 14.5 12.0 - 16.0 g/dL    Hematocrit 43.4 36.0 - 46.0 %    MCV 84 80 - 100 fL    MCH 27.9 26.0 - 34.0 pg    MCHC 33.4 32.0 - 36.0 g/dL    RDW 13.5 11.5 - 14.5 %    Platelets 172 150 - 450 x10*3/uL    Neutrophils % 63.1 40.0 - 80.0 %    Immature Granulocytes %, Automated 0.4 0.0 - 0.9 %    Lymphocytes % 24.3 13.0 - 44.0 %    Monocytes % 8.4 2.0 - 10.0 %    Eosinophils % 3.4 0.0 - 6.0 %    Basophils % 0.4 0.0 - 2.0 %    Neutrophils Absolute 4.64 1.20 - 7.70 x10*3/uL    Immature Granulocytes Absolute, Automated 0.03 0.00 - 0.70 x10*3/uL    Lymphocytes Absolute 1.79 1.20 - 4.80 x10*3/uL    Monocytes Absolute 0.62 0.10 - 1.00 x10*3/uL    Eosinophils Absolute 0.25 0.00 - 0.70 x10*3/uL    Basophils Absolute 0.03 0.00 - 0.10 x10*3/uL   Comprehensive metabolic panel    Collection Time: 01/23/25  8:38 AM   Result Value Ref Range    Glucose 107 (H) 74 - 99 mg/dL    Sodium 140 136 - 145 mmol/L    Potassium 4.0 3.5 - 5.3 mmol/L    Chloride 106 98 - 107 mmol/L    Bicarbonate 25 21 - 32 mmol/L    Anion Gap 13 10 - 20 mmol/L    Urea Nitrogen 18 6 - 23 mg/dL    Creatinine 0.96 0.50 - 1.05 mg/dL    eGFR 68 >60 mL/min/1.73m*2    Calcium 9.2 8.6 - 10.3 mg/dL    Albumin 4.2 3.4 - 5.0 g/dL    Alkaline Phosphatase 52 33 - 110 U/L    Total Protein  "7.1 6.4 - 8.2 g/dL    AST 17 9 - 39 U/L    Bilirubin, Total 0.7 0.0 - 1.2 mg/dL    ALT 11 7 - 45 U/L      No results found for: \"PSA\"     Performance Status:  ECOG Score: 0- Fully active, able to carry on all pre-disease performance w/o restriction.  Karnofsky Score: 100 - Fully active, able to carry on all pre-disease performed without restriction      Assessment/Plan   Kika Marcelino is a 59 y.o. female with a history of RCC who presents in follow up on adjuvant pembrolizumab.     She was diagnosed with stage III clear cell renal cell carcinoma ([id number]) and underwent right partial nephrectomy with negative margins in October 2024. She was recommended to start adjuvant pembrolizumab.    She presents in follow up for consideration of cycle 3. She looks and feels well. Scan report without concern for recurrence. She will see Dr. Nixon next week as well. Plan for CT chest via PCP in April, so will align AP with this.    She has known white coat HTN. Normal BP at home. OK to treat through if needed and asymptomatic.    # Stage III clear cell renal cell carcinoma  - Follows with urology (Dr. Nixon)  - Cycle 3 pembro Friday  - Next imaging April/May 2025    # PNA  - Finished abx  - Monitor     # Tobacco Use  - Encourage cessation    RTC 3 weeks with labs    Total time spent on this encounter was 40 minutes, which included preparation, direct time with patient, documentation, and care coordination on the day of visit.    Savita Schreiber, MSN, APRN, AGNP-C, AOCNP  Associate Nurse Practitioner  [facility]   "

## 2025-02-13 ENCOUNTER — LAB (OUTPATIENT)
Dept: LAB | Facility: CLINIC | Age: 60
End: 2025-02-13
Payer: COMMERCIAL

## 2025-02-13 DIAGNOSIS — C64.1 RENAL CELL CANCER, RIGHT (MULTI): ICD-10-CM

## 2025-02-13 LAB
ALBUMIN SERPL BCP-MCNC: 4.1 G/DL (ref 3.4–5)
ALP SERPL-CCNC: 51 U/L (ref 33–110)
ALT SERPL W P-5'-P-CCNC: 8 U/L (ref 7–45)
ANION GAP SERPL CALC-SCNC: 13 MMOL/L (ref 10–20)
AST SERPL W P-5'-P-CCNC: 16 U/L (ref 9–39)
BASOPHILS # BLD AUTO: 0.03 X10*3/UL (ref 0–0.1)
BASOPHILS NFR BLD AUTO: 0.4 %
BILIRUB SERPL-MCNC: 0.5 MG/DL (ref 0–1.2)
BUN SERPL-MCNC: 15 MG/DL (ref 6–23)
CALCIUM SERPL-MCNC: 8.9 MG/DL (ref 8.6–10.3)
CHLORIDE SERPL-SCNC: 105 MMOL/L (ref 98–107)
CO2 SERPL-SCNC: 23 MMOL/L (ref 21–32)
CREAT SERPL-MCNC: 0.92 MG/DL (ref 0.5–1.05)
EGFRCR SERPLBLD CKD-EPI 2021: 72 ML/MIN/1.73M*2
EOSINOPHIL # BLD AUTO: 0.42 X10*3/UL (ref 0–0.7)
EOSINOPHIL NFR BLD AUTO: 6 %
ERYTHROCYTE [DISTWIDTH] IN BLOOD BY AUTOMATED COUNT: 14 % (ref 11.5–14.5)
GLUCOSE SERPL-MCNC: 169 MG/DL (ref 74–99)
HCT VFR BLD AUTO: 44.2 % (ref 36–46)
HGB BLD-MCNC: 14.4 G/DL (ref 12–16)
IMM GRANULOCYTES # BLD AUTO: 0.02 X10*3/UL (ref 0–0.7)
IMM GRANULOCYTES NFR BLD AUTO: 0.3 % (ref 0–0.9)
LYMPHOCYTES # BLD AUTO: 1.56 X10*3/UL (ref 1.2–4.8)
LYMPHOCYTES NFR BLD AUTO: 22.2 %
MCH RBC QN AUTO: 28.1 PG (ref 26–34)
MCHC RBC AUTO-ENTMCNC: 32.6 G/DL (ref 32–36)
MCV RBC AUTO: 86 FL (ref 80–100)
MONOCYTES # BLD AUTO: 0.34 X10*3/UL (ref 0.1–1)
MONOCYTES NFR BLD AUTO: 4.8 %
NEUTROPHILS # BLD AUTO: 4.67 X10*3/UL (ref 1.2–7.7)
NEUTROPHILS NFR BLD AUTO: 66.3 %
PLATELET # BLD AUTO: 213 X10*3/UL (ref 150–450)
POTASSIUM SERPL-SCNC: 3.9 MMOL/L (ref 3.5–5.3)
PROT SERPL-MCNC: 6.6 G/DL (ref 6.4–8.2)
RBC # BLD AUTO: 5.12 X10*6/UL (ref 4–5.2)
SODIUM SERPL-SCNC: 137 MMOL/L (ref 136–145)
TSH SERPL-ACNC: 0.99 MIU/L (ref 0.44–3.98)
WBC # BLD AUTO: 7 X10*3/UL (ref 4.4–11.3)

## 2025-02-13 PROCEDURE — 36415 COLL VENOUS BLD VENIPUNCTURE: CPT | Performed by: NURSE PRACTITIONER

## 2025-02-13 PROCEDURE — 82533 TOTAL CORTISOL: CPT | Performed by: NURSE PRACTITIONER

## 2025-02-13 PROCEDURE — 85025 COMPLETE CBC W/AUTO DIFF WBC: CPT

## 2025-02-13 PROCEDURE — 80053 COMPREHEN METABOLIC PANEL: CPT

## 2025-02-13 PROCEDURE — 84443 ASSAY THYROID STIM HORMONE: CPT

## 2025-02-13 PROCEDURE — 82024 ASSAY OF ACTH: CPT

## 2025-02-14 ENCOUNTER — INFUSION (OUTPATIENT)
Dept: HEMATOLOGY/ONCOLOGY | Facility: CLINIC | Age: 60
End: 2025-02-14
Payer: COMMERCIAL

## 2025-02-14 VITALS
OXYGEN SATURATION: 97 % | RESPIRATION RATE: 18 BRPM | HEART RATE: 86 BPM | DIASTOLIC BLOOD PRESSURE: 106 MMHG | SYSTOLIC BLOOD PRESSURE: 168 MMHG | WEIGHT: 215.06 LBS | BODY MASS INDEX: 36.91 KG/M2 | TEMPERATURE: 97 F

## 2025-02-14 DIAGNOSIS — C64.1 RENAL CELL CANCER, RIGHT (MULTI): Primary | ICD-10-CM

## 2025-02-14 DIAGNOSIS — C64.1 RENAL CELL CANCER, RIGHT (MULTI): ICD-10-CM

## 2025-02-14 LAB
CORTIS SERPL-MCNC: 11.6 UG/DL (ref 2.5–20)
HCG UR QL IA.RAPID: NEGATIVE

## 2025-02-14 PROCEDURE — 96413 CHEMO IV INFUSION 1 HR: CPT

## 2025-02-14 PROCEDURE — 81025 URINE PREGNANCY TEST: CPT

## 2025-02-14 PROCEDURE — 2500000004 HC RX 250 GENERAL PHARMACY W/ HCPCS (ALT 636 FOR OP/ED): Performed by: NURSE PRACTITIONER

## 2025-02-14 RX ORDER — PROCHLORPERAZINE EDISYLATE 5 MG/ML
10 INJECTION INTRAMUSCULAR; INTRAVENOUS EVERY 6 HOURS PRN
Status: DISCONTINUED | OUTPATIENT
Start: 2025-02-14 | End: 2025-02-14 | Stop reason: HOSPADM

## 2025-02-14 RX ORDER — DIPHENHYDRAMINE HYDROCHLORIDE 50 MG/ML
50 INJECTION INTRAMUSCULAR; INTRAVENOUS AS NEEDED
Status: DISCONTINUED | OUTPATIENT
Start: 2025-02-14 | End: 2025-02-14 | Stop reason: HOSPADM

## 2025-02-14 RX ORDER — HEPARIN SODIUM,PORCINE/PF 10 UNIT/ML
50 SYRINGE (ML) INTRAVENOUS AS NEEDED
OUTPATIENT
Start: 2025-02-14

## 2025-02-14 RX ORDER — HEPARIN 100 UNIT/ML
500 SYRINGE INTRAVENOUS AS NEEDED
OUTPATIENT
Start: 2025-02-14

## 2025-02-14 RX ORDER — EPINEPHRINE 0.3 MG/.3ML
0.3 INJECTION SUBCUTANEOUS EVERY 5 MIN PRN
Status: DISCONTINUED | OUTPATIENT
Start: 2025-02-14 | End: 2025-02-14 | Stop reason: HOSPADM

## 2025-02-14 RX ORDER — FAMOTIDINE 10 MG/ML
20 INJECTION INTRAVENOUS ONCE AS NEEDED
Status: DISCONTINUED | OUTPATIENT
Start: 2025-02-14 | End: 2025-02-14 | Stop reason: HOSPADM

## 2025-02-14 RX ORDER — PROCHLORPERAZINE MALEATE 10 MG
10 TABLET ORAL EVERY 6 HOURS PRN
Status: DISCONTINUED | OUTPATIENT
Start: 2025-02-14 | End: 2025-02-14 | Stop reason: HOSPADM

## 2025-02-14 RX ORDER — ALBUTEROL SULFATE 0.83 MG/ML
3 SOLUTION RESPIRATORY (INHALATION) AS NEEDED
Status: DISCONTINUED | OUTPATIENT
Start: 2025-02-14 | End: 2025-02-14 | Stop reason: HOSPADM

## 2025-02-14 RX ADMIN — SODIUM CHLORIDE 200 MG: 9 INJECTION, SOLUTION INTRAVENOUS at 11:26

## 2025-02-14 ASSESSMENT — PAIN SCALES - GENERAL: PAINLEVEL_OUTOF10: 0-NO PAIN

## 2025-02-14 NOTE — SIGNIFICANT EVENT
02/14/25 1052   Prechemo Checklist   Has the patient been in the hospital, ED, or urgent care since last date of service No   Chemo/Immuno Consent Completed and Signed Yes   Protocol/Indications Verified Yes   Confirmed to previous date/time of medication Yes   Compared to previous dose Yes   All medications are dated accurately Yes   Pregnancy Test Negative Yes   Parameters Met Yes   BSA/Weight-Height Verified Yes   Dose Calculations Verified (current, total, cumulative) Yes

## 2025-02-15 LAB — ACTH PLAS-MCNC: 8.8 PG/ML (ref 7.2–63.3)

## 2025-02-21 ENCOUNTER — APPOINTMENT (OUTPATIENT)
Dept: UROLOGY | Facility: CLINIC | Age: 60
End: 2025-02-21
Payer: COMMERCIAL

## 2025-02-21 VITALS — SYSTOLIC BLOOD PRESSURE: 206 MMHG | TEMPERATURE: 97.5 F | DIASTOLIC BLOOD PRESSURE: 118 MMHG | HEART RATE: 76 BPM

## 2025-02-21 DIAGNOSIS — C64.1 RENAL CELL CANCER, RIGHT (MULTI): Primary | ICD-10-CM

## 2025-02-21 PROCEDURE — G2211 COMPLEX E/M VISIT ADD ON: HCPCS | Performed by: UROLOGY

## 2025-02-21 PROCEDURE — 3080F DIAST BP >= 90 MM HG: CPT | Performed by: UROLOGY

## 2025-02-21 PROCEDURE — 99214 OFFICE O/P EST MOD 30 MIN: CPT | Performed by: UROLOGY

## 2025-02-21 PROCEDURE — 3077F SYST BP >= 140 MM HG: CPT | Performed by: UROLOGY

## 2025-02-21 NOTE — PROGRESS NOTES
PAST UROLOGICAL HISTORY:  58-year-old woman initially presented to ER on 06/29/2024 with atypical pneumonia requiring supplemental oxygen, found to have incidental right exophytic interpolar renal mass abutting liver on CT PE. MRI kidney on 10/01/2024 showed 3.9cm mass without renal vein involvement. Underwent right partial nephrectomy on 10/09/2024 for 4cm mass with grossly negative margins, invading into segmental vein, ischemia time 26min. 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    HPI TODAY 02/21/2025:    Renal Cell Carcinoma:  - Currently receiving monthly pembrolizumab with medical oncology, tolerating well without side effects  - No rash or treatment complications  - Recent CT chest/abdomen/pelvis on 02/03/2025 shows no evidence of disease  - Surgical incisions well-healed without herniation  - Denies hematuria, unexplained weight loss, night sweats, or significant fatigue  - Reports episode of pneumonia in January 2025, now recovered  - Creatinine 0.92, GFR 72    PAST MEDICAL HISTORY:  - Atypical pneumonia (06/2024, 01/2025)    REVIEW OF SYSTEMS: A tailored review of systems was performed and all pertinent positives and negatives are listed in the HPI.    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 Currently receiving adjuvant pembrolizumab. Recent imaging shows no evidence of disease.  - Plan: Continue current treatment course with medical oncology. Follow-up in 6 months. Medical oncology to determine timing of surveillance imaging.    The patient provided verbal consent for the audio recording of today's encounter using AI.

## 2025-03-04 ENCOUNTER — LAB (OUTPATIENT)
Dept: LAB | Facility: CLINIC | Age: 60
End: 2025-03-04
Payer: COMMERCIAL

## 2025-03-04 DIAGNOSIS — C64.1 RENAL CELL CANCER, RIGHT (MULTI): ICD-10-CM

## 2025-03-04 LAB
ALBUMIN SERPL BCP-MCNC: 4.4 G/DL (ref 3.4–5)
ALP SERPL-CCNC: 52 U/L (ref 33–110)
ALT SERPL W P-5'-P-CCNC: 8 U/L (ref 7–45)
ANION GAP SERPL CALC-SCNC: 11 MMOL/L (ref 10–20)
AST SERPL W P-5'-P-CCNC: 16 U/L (ref 9–39)
BASOPHILS # BLD AUTO: 0.03 X10*3/UL (ref 0–0.1)
BASOPHILS NFR BLD AUTO: 0.4 %
BILIRUB SERPL-MCNC: 0.6 MG/DL (ref 0–1.2)
BUN SERPL-MCNC: 18 MG/DL (ref 6–23)
CALCIUM SERPL-MCNC: 9.6 MG/DL (ref 8.6–10.3)
CHLORIDE SERPL-SCNC: 103 MMOL/L (ref 98–107)
CO2 SERPL-SCNC: 29 MMOL/L (ref 21–32)
CREAT SERPL-MCNC: 1.02 MG/DL (ref 0.5–1.05)
EGFRCR SERPLBLD CKD-EPI 2021: 64 ML/MIN/1.73M*2
EOSINOPHIL # BLD AUTO: 0.3 X10*3/UL (ref 0–0.7)
EOSINOPHIL NFR BLD AUTO: 4 %
ERYTHROCYTE [DISTWIDTH] IN BLOOD BY AUTOMATED COUNT: 14.1 % (ref 11.5–14.5)
GLUCOSE SERPL-MCNC: 112 MG/DL (ref 74–99)
HCT VFR BLD AUTO: 47.7 % (ref 36–46)
HGB BLD-MCNC: 14.7 G/DL (ref 12–16)
IMM GRANULOCYTES # BLD AUTO: 0.02 X10*3/UL (ref 0–0.7)
IMM GRANULOCYTES NFR BLD AUTO: 0.3 % (ref 0–0.9)
LYMPHOCYTES # BLD AUTO: 1.88 X10*3/UL (ref 1.2–4.8)
LYMPHOCYTES NFR BLD AUTO: 25.4 %
MCH RBC QN AUTO: 28.1 PG (ref 26–34)
MCHC RBC AUTO-ENTMCNC: 30.8 G/DL (ref 32–36)
MCV RBC AUTO: 91 FL (ref 80–100)
MONOCYTES # BLD AUTO: 0.4 X10*3/UL (ref 0.1–1)
MONOCYTES NFR BLD AUTO: 5.4 %
NEUTROPHILS # BLD AUTO: 4.78 X10*3/UL (ref 1.2–7.7)
NEUTROPHILS NFR BLD AUTO: 64.5 %
PLATELET # BLD AUTO: 199 X10*3/UL (ref 150–450)
POTASSIUM SERPL-SCNC: 4 MMOL/L (ref 3.5–5.3)
PROT SERPL-MCNC: 7 G/DL (ref 6.4–8.2)
RBC # BLD AUTO: 5.23 X10*6/UL (ref 4–5.2)
SODIUM SERPL-SCNC: 139 MMOL/L (ref 136–145)
WBC # BLD AUTO: 7.4 X10*3/UL (ref 4.4–11.3)

## 2025-03-04 PROCEDURE — 36415 COLL VENOUS BLD VENIPUNCTURE: CPT

## 2025-03-04 PROCEDURE — 85025 COMPLETE CBC W/AUTO DIFF WBC: CPT

## 2025-03-04 PROCEDURE — 80053 COMPREHEN METABOLIC PANEL: CPT

## 2025-03-07 ENCOUNTER — OFFICE VISIT (OUTPATIENT)
Dept: HEMATOLOGY/ONCOLOGY | Facility: CLINIC | Age: 60
End: 2025-03-07
Payer: COMMERCIAL

## 2025-03-07 ENCOUNTER — INFUSION (OUTPATIENT)
Dept: HEMATOLOGY/ONCOLOGY | Facility: CLINIC | Age: 60
End: 2025-03-07
Payer: COMMERCIAL

## 2025-03-07 VITALS
TEMPERATURE: 97.2 F | HEART RATE: 88 BPM | WEIGHT: 217.2 LBS | DIASTOLIC BLOOD PRESSURE: 90 MMHG | SYSTOLIC BLOOD PRESSURE: 150 MMHG | OXYGEN SATURATION: 96 % | RESPIRATION RATE: 16 BRPM | BODY MASS INDEX: 37.28 KG/M2

## 2025-03-07 DIAGNOSIS — C64.1 RENAL CELL CANCER, RIGHT (MULTI): Primary | ICD-10-CM

## 2025-03-07 DIAGNOSIS — C64.1 RENAL CELL CANCER, RIGHT (MULTI): ICD-10-CM

## 2025-03-07 PROCEDURE — 99215 OFFICE O/P EST HI 40 MIN: CPT | Performed by: NURSE PRACTITIONER

## 2025-03-07 PROCEDURE — 96413 CHEMO IV INFUSION 1 HR: CPT

## 2025-03-07 PROCEDURE — 3080F DIAST BP >= 90 MM HG: CPT | Performed by: NURSE PRACTITIONER

## 2025-03-07 PROCEDURE — 3077F SYST BP >= 140 MM HG: CPT | Performed by: NURSE PRACTITIONER

## 2025-03-07 PROCEDURE — 2500000004 HC RX 250 GENERAL PHARMACY W/ HCPCS (ALT 636 FOR OP/ED): Mod: JZ,TB | Performed by: NURSE PRACTITIONER

## 2025-03-07 RX ORDER — EPINEPHRINE 0.3 MG/.3ML
0.3 INJECTION SUBCUTANEOUS EVERY 5 MIN PRN
Status: CANCELLED | OUTPATIENT
Start: 2025-03-07

## 2025-03-07 RX ORDER — FAMOTIDINE 10 MG/ML
20 INJECTION, SOLUTION INTRAVENOUS ONCE AS NEEDED
Status: DISCONTINUED | OUTPATIENT
Start: 2025-03-07 | End: 2025-03-07 | Stop reason: HOSPADM

## 2025-03-07 RX ORDER — DIPHENHYDRAMINE HYDROCHLORIDE 50 MG/ML
50 INJECTION INTRAMUSCULAR; INTRAVENOUS AS NEEDED
Status: DISCONTINUED | OUTPATIENT
Start: 2025-03-07 | End: 2025-03-07 | Stop reason: HOSPADM

## 2025-03-07 RX ORDER — ALBUTEROL SULFATE 0.83 MG/ML
3 SOLUTION RESPIRATORY (INHALATION) AS NEEDED
Status: DISCONTINUED | OUTPATIENT
Start: 2025-03-07 | End: 2025-03-07 | Stop reason: HOSPADM

## 2025-03-07 RX ORDER — PROCHLORPERAZINE MALEATE 10 MG
10 TABLET ORAL EVERY 6 HOURS PRN
Status: DISCONTINUED | OUTPATIENT
Start: 2025-03-07 | End: 2025-03-07 | Stop reason: HOSPADM

## 2025-03-07 RX ORDER — PROCHLORPERAZINE EDISYLATE 5 MG/ML
10 INJECTION INTRAMUSCULAR; INTRAVENOUS EVERY 6 HOURS PRN
Status: DISCONTINUED | OUTPATIENT
Start: 2025-03-07 | End: 2025-03-07 | Stop reason: HOSPADM

## 2025-03-07 RX ORDER — DIPHENHYDRAMINE HYDROCHLORIDE 50 MG/ML
50 INJECTION INTRAMUSCULAR; INTRAVENOUS AS NEEDED
Status: CANCELLED | OUTPATIENT
Start: 2025-03-07

## 2025-03-07 RX ORDER — PROCHLORPERAZINE EDISYLATE 5 MG/ML
10 INJECTION INTRAMUSCULAR; INTRAVENOUS EVERY 6 HOURS PRN
Status: CANCELLED | OUTPATIENT
Start: 2025-03-07

## 2025-03-07 RX ORDER — ALBUTEROL SULFATE 0.83 MG/ML
3 SOLUTION RESPIRATORY (INHALATION) AS NEEDED
Status: CANCELLED | OUTPATIENT
Start: 2025-03-07

## 2025-03-07 RX ORDER — EPINEPHRINE 0.3 MG/.3ML
0.3 INJECTION SUBCUTANEOUS EVERY 5 MIN PRN
Status: DISCONTINUED | OUTPATIENT
Start: 2025-03-07 | End: 2025-03-07 | Stop reason: HOSPADM

## 2025-03-07 RX ORDER — FAMOTIDINE 10 MG/ML
20 INJECTION, SOLUTION INTRAVENOUS ONCE AS NEEDED
Status: CANCELLED | OUTPATIENT
Start: 2025-03-07

## 2025-03-07 RX ORDER — PROCHLORPERAZINE MALEATE 5 MG
10 TABLET ORAL EVERY 6 HOURS PRN
Status: CANCELLED | OUTPATIENT
Start: 2025-03-07

## 2025-03-07 RX ADMIN — SODIUM CHLORIDE 200 MG: 9 INJECTION, SOLUTION INTRAVENOUS at 15:14

## 2025-03-07 ASSESSMENT — PAIN SCALES - GENERAL: PAINLEVEL_OUTOF10: 0-NO PAIN

## 2025-03-07 NOTE — PROGRESS NOTES
Patient ID: Kika Marcelino is a 59 y.o. female.  Attending Physician: Dr. Cecilio Finnegan  Cancer Diagnosis:  Cancer Staging   Renal cell cancer, right (Multi)  Staging form: Kidney, AJCC 8th Edition  - Clinical stage from 10/9/2024: Stage III (ycT3a, cN0, cM0) - Signed by Gerard Nixon MD on 11/5/2024     Current Therapy: Adjuvant Pembrolizumab  200 mg IV day 1  21-day cycles    Genetics: N/A    Subjective      Cancer History:  Oncology History   Renal cell cancer, right (Multi)   8/13/2024 Initial Diagnosis    Renal cell cancer, right (Multi)     10/9/2024 Cancer Staged    Staging form: Kidney, AJCC 8th Edition, Clinical stage from 10/9/2024: Stage III (ycT3a, cN0, cM0) - Signed by Gerard Nixon MD on 11/5/2024     1/3/2025 -  Chemotherapy    Pembrolizumab, 21 Day Cycles       6/27/24 Admitted for COVID PNA, incidental renal mass found    6/28/24 CT kidney shows R renal lesion     10/1/24 MR kidney - 3.9 cm R renal mass     10/9/24 R partial nephrectomy - 3.9 cm ccRCC of R kidney, grade 3. Invade segmental vein. Margins negative. Staged pT3aNX     10/23/24 CT chest without metastasis, resolution of previously identified infiltrate    1/3/25  Cycle 1 Day 1 Adjuvant Pembrolizumab    1/24/25 Cycle 2 day 1 Pembro    2/14/25 Cycle 3 Pembro     3/7/25  Cycle 4 adjuvant pembro    Interval History:  Ms. Marcelino presents today in follow up and consideration of cycle 4. She feels well. She has not had any further episodes since PNA. Skin is doing great. No new or concerning symptoms otherwise. The remainder of her ROS is otherwise negative.  HPI    Objective    BSA: 2.11 meters squared  /90 (BP Location: Left arm, Patient Position: Sitting)   Pulse 88   Temp 36.2 °C (97.2 °F) (Temporal)   Resp 16   Wt 98.5 kg (217 lb 3.2 oz)   LMP  (LMP Unknown) Comment: more than ten years ago  SpO2 96%   BMI 37.28 kg/m²     Physical Exam  PHYSICAL EXAM:  General: alert, well-dressed in NAD. Speech is fluent and coherent, words  clear. Good insight.   Skin: warm, dry, and pink without cyanosis or nail clubbing. No rash, petechiae, or ecchymoses  HEENT: Normocephalic atraumatic. Sclera white, conjunctiva pink. EOMs intact. Hearing intact to spoken voice. Oral mucosa pink. No visible lesions  Respiratory: Chest expansion symmetric. Breath sounds vesicular in all lobes without crackles, wheezes, or rhonchi bilaterally.  CV: S1S2 audible and crisp without murmurs, rubs, or extra sounds. RRR. Radial pulses strong and regular. Extremities warm and appropriate color for race. No edema bilaterally.  Lymph: no palpable cervical, submandibular, or supraclavicular lymphadenopathy.  GI: abdomen is round, soft, and non-tender. Active bowel sounds.   Psych: engaged, polite, appropriate conversation and eye contact.    Current Medications:    Current Outpatient Medications:     amLODIPine (Norvasc) 2.5 mg tablet, Take 1 tablet (2.5 mg) by mouth once daily., Disp: 30 tablet, Rfl: 5     Most Recent Labs:  Results for orders placed or performed in visit on 03/04/25   CBC and Auto Differential    Collection Time: 03/04/25 12:30 PM   Result Value Ref Range    WBC 7.4 4.4 - 11.3 x10*3/uL    RBC 5.23 (H) 4.00 - 5.20 x10*6/uL    Hemoglobin 14.7 12.0 - 16.0 g/dL    Hematocrit 47.7 (H) 36.0 - 46.0 %    MCV 91 80 - 100 fL    MCH 28.1 26.0 - 34.0 pg    MCHC 30.8 (L) 32.0 - 36.0 g/dL    RDW 14.1 11.5 - 14.5 %    Platelets 199 150 - 450 x10*3/uL    Neutrophils % 64.5 40.0 - 80.0 %    Immature Granulocytes %, Automated 0.3 0.0 - 0.9 %    Lymphocytes % 25.4 13.0 - 44.0 %    Monocytes % 5.4 2.0 - 10.0 %    Eosinophils % 4.0 0.0 - 6.0 %    Basophils % 0.4 0.0 - 2.0 %    Neutrophils Absolute 4.78 1.20 - 7.70 x10*3/uL    Immature Granulocytes Absolute, Automated 0.02 0.00 - 0.70 x10*3/uL    Lymphocytes Absolute 1.88 1.20 - 4.80 x10*3/uL    Monocytes Absolute 0.40 0.10 - 1.00 x10*3/uL    Eosinophils Absolute 0.30 0.00 - 0.70 x10*3/uL    Basophils Absolute 0.03 0.00 - 0.10  "x10*3/uL   Comprehensive metabolic panel    Collection Time: 03/04/25 12:30 PM   Result Value Ref Range    Glucose 112 (H) 74 - 99 mg/dL    Sodium 139 136 - 145 mmol/L    Potassium 4.0 3.5 - 5.3 mmol/L    Chloride 103 98 - 107 mmol/L    Bicarbonate 29 21 - 32 mmol/L    Anion Gap 11 10 - 20 mmol/L    Urea Nitrogen 18 6 - 23 mg/dL    Creatinine 1.02 0.50 - 1.05 mg/dL    eGFR 64 >60 mL/min/1.73m*2    Calcium 9.6 8.6 - 10.3 mg/dL    Albumin 4.4 3.4 - 5.0 g/dL    Alkaline Phosphatase 52 33 - 110 U/L    Total Protein 7.0 6.4 - 8.2 g/dL    AST 16 9 - 39 U/L    Bilirubin, Total 0.6 0.0 - 1.2 mg/dL    ALT 8 7 - 45 U/L      No results found for: \"PSA\"     Performance Status:  ECOG Score: 0- Fully active, able to carry on all pre-disease performance w/o restriction.  Karnofsky Score: 100 - Fully active, able to carry on all pre-disease performed without restriction      Assessment/Plan   Kika Marcelino is a 59 y.o. female with a history of RCC who presents in follow up on adjuvant pembrolizumab.     She was diagnosed with stage III clear cell renal cell carcinoma ([id number]) and underwent right partial nephrectomy with negative margins in October 2024. She was recommended to start adjuvant pembrolizumab.    She presents in follow up for consideration of cycle 4. She looks and feels well. Scans completed in February per Dr. Nixon. Has CT for PCP in April, so we will align ours with this. Message sent to change to contrast, this was ordered.    # Stage III clear cell renal cell carcinoma  - Follows with urology (Dr. Nixon)  - Cycle 4 pembro Friday  - Next imaging April/May 2025 - ordered to be done in alignment with PCP request     # Tobacco Use  - Encourage cessation  - She will let us know if/when she is ready to discuss further    RTC 3 weeks with labs    Total time spent on this encounter was 40 minutes, which included preparation, direct time with patient, documentation, and care coordination on the day of visit.    Savita" Candie, MSN, APRN, AGNP-C, AOCNP  Associate Nurse Practitioner  [facility]

## 2025-03-26 ENCOUNTER — LAB (OUTPATIENT)
Dept: LAB | Facility: CLINIC | Age: 60
End: 2025-03-26
Payer: COMMERCIAL

## 2025-03-26 DIAGNOSIS — C64.1 RENAL CELL CANCER, RIGHT (MULTI): ICD-10-CM

## 2025-03-26 DIAGNOSIS — N28.89 RIGHT RENAL MASS: ICD-10-CM

## 2025-03-26 LAB
ALBUMIN SERPL BCP-MCNC: 4.2 G/DL (ref 3.4–5)
ALP SERPL-CCNC: 53 U/L (ref 33–110)
ALT SERPL W P-5'-P-CCNC: 11 U/L (ref 7–45)
ANION GAP SERPL CALC-SCNC: 11 MMOL/L (ref 10–20)
AST SERPL W P-5'-P-CCNC: 16 U/L (ref 9–39)
BASOPHILS # BLD AUTO: 0.03 X10*3/UL (ref 0–0.1)
BASOPHILS NFR BLD AUTO: 0.4 %
BILIRUB SERPL-MCNC: 0.4 MG/DL (ref 0–1.2)
BUN SERPL-MCNC: 17 MG/DL (ref 6–23)
CALCIUM SERPL-MCNC: 9.6 MG/DL (ref 8.6–10.3)
CHLORIDE SERPL-SCNC: 103 MMOL/L (ref 98–107)
CO2 SERPL-SCNC: 31 MMOL/L (ref 21–32)
CREAT SERPL-MCNC: 1.09 MG/DL (ref 0.5–1.05)
EGFRCR SERPLBLD CKD-EPI 2021: 59 ML/MIN/1.73M*2
EOSINOPHIL # BLD AUTO: 0.29 X10*3/UL (ref 0–0.7)
EOSINOPHIL NFR BLD AUTO: 4 %
ERYTHROCYTE [DISTWIDTH] IN BLOOD BY AUTOMATED COUNT: 13.8 % (ref 11.5–14.5)
GLUCOSE SERPL-MCNC: 90 MG/DL (ref 74–99)
HCT VFR BLD AUTO: 43.8 % (ref 36–46)
HGB BLD-MCNC: 14.4 G/DL (ref 12–16)
IMM GRANULOCYTES # BLD AUTO: 0.02 X10*3/UL (ref 0–0.7)
IMM GRANULOCYTES NFR BLD AUTO: 0.3 % (ref 0–0.9)
LYMPHOCYTES # BLD AUTO: 1.71 X10*3/UL (ref 1.2–4.8)
LYMPHOCYTES NFR BLD AUTO: 23.5 %
MCH RBC QN AUTO: 28.5 PG (ref 26–34)
MCHC RBC AUTO-ENTMCNC: 32.9 G/DL (ref 32–36)
MCV RBC AUTO: 87 FL (ref 80–100)
MONOCYTES # BLD AUTO: 0.51 X10*3/UL (ref 0.1–1)
MONOCYTES NFR BLD AUTO: 7 %
NEUTROPHILS # BLD AUTO: 4.73 X10*3/UL (ref 1.2–7.7)
NEUTROPHILS NFR BLD AUTO: 64.8 %
PLATELET # BLD AUTO: 233 X10*3/UL (ref 150–450)
POTASSIUM SERPL-SCNC: 4.5 MMOL/L (ref 3.5–5.3)
PROT SERPL-MCNC: 6.8 G/DL (ref 6.4–8.2)
RBC # BLD AUTO: 5.05 X10*6/UL (ref 4–5.2)
SODIUM SERPL-SCNC: 140 MMOL/L (ref 136–145)
TSH SERPL-ACNC: 0.63 MIU/L (ref 0.44–3.98)
WBC # BLD AUTO: 7.3 X10*3/UL (ref 4.4–11.3)

## 2025-03-26 PROCEDURE — 84443 ASSAY THYROID STIM HORMONE: CPT

## 2025-03-26 PROCEDURE — 80053 COMPREHEN METABOLIC PANEL: CPT

## 2025-03-26 PROCEDURE — 36415 COLL VENOUS BLD VENIPUNCTURE: CPT

## 2025-03-26 PROCEDURE — 82024 ASSAY OF ACTH: CPT

## 2025-03-26 PROCEDURE — 82533 TOTAL CORTISOL: CPT

## 2025-03-26 PROCEDURE — 85025 COMPLETE CBC W/AUTO DIFF WBC: CPT

## 2025-03-27 LAB — CORTIS AM PEAK SERPL-MSCNC: 10.6 UG/DL (ref 5–20)

## 2025-03-28 ENCOUNTER — OFFICE VISIT (OUTPATIENT)
Dept: HEMATOLOGY/ONCOLOGY | Facility: CLINIC | Age: 60
End: 2025-03-28
Payer: COMMERCIAL

## 2025-03-28 ENCOUNTER — INFUSION (OUTPATIENT)
Dept: HEMATOLOGY/ONCOLOGY | Facility: CLINIC | Age: 60
End: 2025-03-28
Payer: COMMERCIAL

## 2025-03-28 VITALS
BODY MASS INDEX: 37.52 KG/M2 | DIASTOLIC BLOOD PRESSURE: 120 MMHG | RESPIRATION RATE: 16 BRPM | WEIGHT: 218.6 LBS | TEMPERATURE: 97.5 F | SYSTOLIC BLOOD PRESSURE: 173 MMHG | HEART RATE: 81 BPM | OXYGEN SATURATION: 98 %

## 2025-03-28 VITALS — DIASTOLIC BLOOD PRESSURE: 91 MMHG | BODY MASS INDEX: 37.55 KG/M2 | SYSTOLIC BLOOD PRESSURE: 159 MMHG | HEIGHT: 64 IN

## 2025-03-28 DIAGNOSIS — C64.1 RENAL CELL CANCER, RIGHT (MULTI): ICD-10-CM

## 2025-03-28 DIAGNOSIS — C64.1 RENAL CELL CANCER, RIGHT (MULTI): Primary | ICD-10-CM

## 2025-03-28 LAB — ACTH PLAS-MCNC: 12.6 PG/ML (ref 7.2–63.3)

## 2025-03-28 PROCEDURE — 99214 OFFICE O/P EST MOD 30 MIN: CPT | Performed by: NURSE PRACTITIONER

## 2025-03-28 PROCEDURE — 3077F SYST BP >= 140 MM HG: CPT | Performed by: NURSE PRACTITIONER

## 2025-03-28 PROCEDURE — 3080F DIAST BP >= 90 MM HG: CPT | Performed by: NURSE PRACTITIONER

## 2025-03-28 PROCEDURE — 96413 CHEMO IV INFUSION 1 HR: CPT

## 2025-03-28 PROCEDURE — 99214 OFFICE O/P EST MOD 30 MIN: CPT | Mod: 25 | Performed by: NURSE PRACTITIONER

## 2025-03-28 PROCEDURE — 2500000004 HC RX 250 GENERAL PHARMACY W/ HCPCS (ALT 636 FOR OP/ED): Mod: JZ,TB | Performed by: NURSE PRACTITIONER

## 2025-03-28 RX ORDER — ALBUTEROL SULFATE 0.83 MG/ML
3 SOLUTION RESPIRATORY (INHALATION) AS NEEDED
Status: DISCONTINUED | OUTPATIENT
Start: 2025-03-28 | End: 2025-03-28 | Stop reason: HOSPADM

## 2025-03-28 RX ORDER — PROCHLORPERAZINE EDISYLATE 5 MG/ML
10 INJECTION INTRAMUSCULAR; INTRAVENOUS EVERY 6 HOURS PRN
Status: DISCONTINUED | OUTPATIENT
Start: 2025-03-28 | End: 2025-03-28 | Stop reason: HOSPADM

## 2025-03-28 RX ORDER — PROCHLORPERAZINE MALEATE 10 MG
10 TABLET ORAL EVERY 6 HOURS PRN
Status: DISCONTINUED | OUTPATIENT
Start: 2025-03-28 | End: 2025-03-28 | Stop reason: HOSPADM

## 2025-03-28 RX ORDER — HEPARIN 100 UNIT/ML
500 SYRINGE INTRAVENOUS AS NEEDED
OUTPATIENT
Start: 2025-03-28

## 2025-03-28 RX ORDER — FAMOTIDINE 10 MG/ML
20 INJECTION, SOLUTION INTRAVENOUS ONCE AS NEEDED
Status: DISCONTINUED | OUTPATIENT
Start: 2025-03-28 | End: 2025-03-28 | Stop reason: HOSPADM

## 2025-03-28 RX ORDER — EPINEPHRINE 0.3 MG/.3ML
0.3 INJECTION SUBCUTANEOUS EVERY 5 MIN PRN
Status: CANCELLED | OUTPATIENT
Start: 2025-03-28

## 2025-03-28 RX ORDER — ALBUTEROL SULFATE 0.83 MG/ML
3 SOLUTION RESPIRATORY (INHALATION) AS NEEDED
Status: CANCELLED | OUTPATIENT
Start: 2025-03-28

## 2025-03-28 RX ORDER — HEPARIN SODIUM,PORCINE/PF 10 UNIT/ML
50 SYRINGE (ML) INTRAVENOUS AS NEEDED
OUTPATIENT
Start: 2025-03-28

## 2025-03-28 RX ORDER — EPINEPHRINE 0.3 MG/.3ML
0.3 INJECTION SUBCUTANEOUS EVERY 5 MIN PRN
Status: DISCONTINUED | OUTPATIENT
Start: 2025-03-28 | End: 2025-03-28 | Stop reason: HOSPADM

## 2025-03-28 RX ORDER — DIPHENHYDRAMINE HYDROCHLORIDE 50 MG/ML
50 INJECTION, SOLUTION INTRAMUSCULAR; INTRAVENOUS AS NEEDED
Status: CANCELLED | OUTPATIENT
Start: 2025-03-28

## 2025-03-28 RX ORDER — PROCHLORPERAZINE EDISYLATE 5 MG/ML
10 INJECTION INTRAMUSCULAR; INTRAVENOUS EVERY 6 HOURS PRN
Status: CANCELLED | OUTPATIENT
Start: 2025-03-28

## 2025-03-28 RX ORDER — PROCHLORPERAZINE MALEATE 5 MG
10 TABLET ORAL EVERY 6 HOURS PRN
Status: CANCELLED | OUTPATIENT
Start: 2025-03-28

## 2025-03-28 RX ORDER — DIPHENHYDRAMINE HYDROCHLORIDE 50 MG/ML
50 INJECTION, SOLUTION INTRAMUSCULAR; INTRAVENOUS AS NEEDED
Status: DISCONTINUED | OUTPATIENT
Start: 2025-03-28 | End: 2025-03-28 | Stop reason: HOSPADM

## 2025-03-28 RX ORDER — FAMOTIDINE 10 MG/ML
20 INJECTION, SOLUTION INTRAVENOUS ONCE AS NEEDED
Status: CANCELLED | OUTPATIENT
Start: 2025-03-28

## 2025-03-28 RX ADMIN — SODIUM CHLORIDE 200 MG: 9 INJECTION, SOLUTION INTRAVENOUS at 11:06

## 2025-03-28 ASSESSMENT — PAIN SCALES - GENERAL: PAINLEVEL_OUTOF10: 0-NO PAIN

## 2025-03-28 NOTE — PROGRESS NOTES
Patient ID: Kika Marcelino is a 59 y.o. female.  Attending Physician: Dr. Cecilio Finnegan  Cancer Diagnosis:  Cancer Staging   Renal cell cancer, right (Multi)  Staging form: Kidney, AJCC 8th Edition  - Clinical stage from 10/9/2024: Stage III (ycT3a, cN0, cM0) - Signed by Gerard Nixon MD on 11/5/2024     Current Therapy: Adjuvant Pembrolizumab  200 mg IV day 1  21-day cycles    Genetics: N/A    Subjective      Cancer History:  Oncology History   Renal cell cancer, right (Multi)   8/13/2024 Initial Diagnosis    Renal cell cancer, right (Multi)     10/9/2024 Cancer Staged    Staging form: Kidney, AJCC 8th Edition, Clinical stage from 10/9/2024: Stage III (ycT3a, cN0, cM0) - Signed by Gerard Nixon MD on 11/5/2024     1/3/2025 -  Chemotherapy    Pembrolizumab, 21 Day Cycles       6/27/24 Admitted for COVID PNA, incidental renal mass found    6/28/24 CT kidney shows R renal lesion     10/1/24 MR kidney - 3.9 cm R renal mass     10/9/24 R partial nephrectomy - 3.9 cm ccRCC of R kidney, grade 3. Invade segmental vein. Margins negative. Staged pT3aNX     10/23/24 CT chest without metastasis, resolution of previously identified infiltrate    1/3/25  Cycle 1 Day 1 Adjuvant Pembrolizumab    1/24/25 Cycle 2 day 1 Pembro    2/14/25 Cycle 3 Pembro     3/7/25  Cycle 4 adjuvant pembro    3/28/25 Cycle 5 Pembro    Interval History:  Ms. Marcelino presents today in follow up and consideration of cycle 5. She continues to feel well without new or concerning symptoms otherwise. The remainder of her ROS is otherwise negative.  HPI    Objective    BSA: 2.12 meters squared  BP (!) 173/120 (BP Location: Left arm, Patient Position: Sitting)   Pulse 81   Temp 36.4 °C (97.5 °F) (Temporal)   Resp 16   Wt 99.2 kg (218 lb 9.6 oz)   LMP  (LMP Unknown) Comment: more than ten years ago  SpO2 98%   BMI 37.52 kg/m²     Physical Exam  PHYSICAL EXAM:  General: alert, well-dressed in NAD. Speech is fluent and coherent, words clear. Good insight.    Skin: warm, dry, and pink without cyanosis or nail clubbing. No rash, petechiae, or ecchymoses  HEENT: Normocephalic atraumatic. Sclera white, conjunctiva pink. EOMs intact. Hearing intact to spoken voice. Oral mucosa pink. No visible lesions  Respiratory: Chest expansion symmetric. Breath sounds vesicular in all lobes without crackles, wheezes, or rhonchi bilaterally.  CV: S1S2 audible and crisp without murmurs, rubs, or extra sounds. RRR. Radial pulses strong and regular. Extremities warm and appropriate color for race. No edema bilaterally.  Lymph: no palpable cervical, submandibular, or supraclavicular lymphadenopathy.  GI: abdomen is round, soft, and non-tender. Active bowel sounds.   Psych: engaged, polite, appropriate conversation and eye contact.    Current Medications:    Current Outpatient Medications:     amLODIPine (Norvasc) 2.5 mg tablet, Take 1 tablet (2.5 mg) by mouth once daily., Disp: 30 tablet, Rfl: 5     Most Recent Labs:  Results for orders placed or performed in visit on 03/26/25   Comprehensive metabolic panel    Collection Time: 03/26/25  1:35 PM   Result Value Ref Range    Glucose 90 74 - 99 mg/dL    Sodium 140 136 - 145 mmol/L    Potassium 4.5 3.5 - 5.3 mmol/L    Chloride 103 98 - 107 mmol/L    Bicarbonate 31 21 - 32 mmol/L    Anion Gap 11 10 - 20 mmol/L    Urea Nitrogen 17 6 - 23 mg/dL    Creatinine 1.09 (H) 0.50 - 1.05 mg/dL    eGFR 59 (L) >60 mL/min/1.73m*2    Calcium 9.6 8.6 - 10.3 mg/dL    Albumin 4.2 3.4 - 5.0 g/dL    Alkaline Phosphatase 53 33 - 110 U/L    Total Protein 6.8 6.4 - 8.2 g/dL    AST 16 9 - 39 U/L    Bilirubin, Total 0.4 0.0 - 1.2 mg/dL    ALT 11 7 - 45 U/L   CBC and Auto Differential    Collection Time: 03/26/25  1:35 PM   Result Value Ref Range    WBC 7.3 4.4 - 11.3 x10*3/uL    RBC 5.05 4.00 - 5.20 x10*6/uL    Hemoglobin 14.4 12.0 - 16.0 g/dL    Hematocrit 43.8 36.0 - 46.0 %    MCV 87 80 - 100 fL    MCH 28.5 26.0 - 34.0 pg    MCHC 32.9 32.0 - 36.0 g/dL    RDW 13.8 11.5  "- 14.5 %    Platelets 233 150 - 450 x10*3/uL    Neutrophils % 64.8 40.0 - 80.0 %    Immature Granulocytes %, Automated 0.3 0.0 - 0.9 %    Lymphocytes % 23.5 13.0 - 44.0 %    Monocytes % 7.0 2.0 - 10.0 %    Eosinophils % 4.0 0.0 - 6.0 %    Basophils % 0.4 0.0 - 2.0 %    Neutrophils Absolute 4.73 1.20 - 7.70 x10*3/uL    Immature Granulocytes Absolute, Automated 0.02 0.00 - 0.70 x10*3/uL    Lymphocytes Absolute 1.71 1.20 - 4.80 x10*3/uL    Monocytes Absolute 0.51 0.10 - 1.00 x10*3/uL    Eosinophils Absolute 0.29 0.00 - 0.70 x10*3/uL    Basophils Absolute 0.03 0.00 - 0.10 x10*3/uL   Acth    Collection Time: 03/26/25  1:35 PM   Result Value Ref Range    Adrenocorticotropic Hormone (ACTH) 12.6 7.2 - 63.3 pg/mL   Cortisol Am    Collection Time: 03/26/25  1:35 PM   Result Value Ref Range    Cortisol  A.M. 10.6 5.0 - 20.0 ug/dL   Tsh With Reflex To Free T4 If Abnormal    Collection Time: 03/26/25  1:35 PM   Result Value Ref Range    Thyroid Stimulating Hormone 0.63 0.44 - 3.98 mIU/L      No results found for: \"PSA\"     Performance Status:  ECOG Score: 0- Fully active, able to carry on all pre-disease performance w/o restriction.  Karnofsky Score: 100 - Fully active, able to carry on all pre-disease performed without restriction      Assessment/Plan   Kika Marcelino is a 59 y.o. female with a history of RCC who presents in follow up on adjuvant pembrolizumab.     She was diagnosed with stage III clear cell renal cell carcinoma ([id number]) and underwent right partial nephrectomy with negative margins in October 2024. She was recommended to start adjuvant pembrolizumab.    She presents in follow up for consideration of cycle 5. She looks and feels well. Scans completed in February per Dr. Nixon, and has a CT scheduled in April with PCP, so we changed this to align with ours. She will have CT prior to next cycle.    # Stage III clear cell renal cell carcinoma  - Follows with urology (Dr. Nixon)  - Cycle 5 pembro today  - Next " imaging - ordered to be done in alignment with PCP request     # Tobacco Use  - Encourage cessation  - She will let us know if/when she is ready to discuss further    RTC 3 weeks with labs and scans prior    Total time spent on this encounter was 30 minutes, which included preparation, direct time with patient, documentation, and care coordination on the day of visit.    Savita Schreiber, MSN, APRN, AGNP-C, AOCNP  Associate Nurse Practitioner  [facility]

## 2025-03-28 NOTE — SIGNIFICANT EVENT
03/28/25 1054   Prechemo Checklist   Has the patient been in the hospital, ED, or urgent care since last date of service No   Chemo/Immuno Consent Completed and Signed Yes   Confirmed to previous date/time of medication Yes   Compared to previous dose Yes   All medications are dated accurately Yes   Pregnancy Test Negative Not applicable   Parameters Met Yes   BSA/Weight-Height Verified Yes   Dose Calculations Verified (current, total, cumulative) Yes

## 2025-03-28 NOTE — PROGRESS NOTES
Pt seen and assessed today 3/28/25 by GHISLAINE Schreiber NP prior to today's treatment.  RN assessment in flowsheet.

## 2025-04-15 ENCOUNTER — HOSPITAL ENCOUNTER (OUTPATIENT)
Dept: RADIOLOGY | Facility: HOSPITAL | Age: 60
Discharge: HOME | End: 2025-04-15
Payer: COMMERCIAL

## 2025-04-15 ENCOUNTER — APPOINTMENT (OUTPATIENT)
Dept: RADIOLOGY | Facility: HOSPITAL | Age: 60
End: 2025-04-15
Payer: COMMERCIAL

## 2025-04-15 DIAGNOSIS — C64.1 RENAL CELL CANCER, RIGHT (MULTI): ICD-10-CM

## 2025-04-15 PROCEDURE — 2550000001 HC RX 255 CONTRASTS: Performed by: NURSE PRACTITIONER

## 2025-04-15 PROCEDURE — 74177 CT ABD & PELVIS W/CONTRAST: CPT

## 2025-04-15 RX ADMIN — IOHEXOL 75 ML: 350 INJECTION, SOLUTION INTRAVENOUS at 16:22

## 2025-04-16 RX ORDER — PROCHLORPERAZINE EDISYLATE 5 MG/ML
10 INJECTION INTRAMUSCULAR; INTRAVENOUS EVERY 6 HOURS PRN
Status: CANCELLED | OUTPATIENT
Start: 2025-04-18

## 2025-04-16 RX ORDER — FAMOTIDINE 10 MG/ML
20 INJECTION, SOLUTION INTRAVENOUS ONCE AS NEEDED
Status: CANCELLED | OUTPATIENT
Start: 2025-04-18

## 2025-04-16 RX ORDER — EPINEPHRINE 0.3 MG/.3ML
0.3 INJECTION SUBCUTANEOUS EVERY 5 MIN PRN
Status: CANCELLED | OUTPATIENT
Start: 2025-04-18

## 2025-04-16 RX ORDER — DIPHENHYDRAMINE HYDROCHLORIDE 50 MG/ML
50 INJECTION, SOLUTION INTRAMUSCULAR; INTRAVENOUS AS NEEDED
Status: CANCELLED | OUTPATIENT
Start: 2025-04-18

## 2025-04-16 RX ORDER — PROCHLORPERAZINE MALEATE 5 MG
10 TABLET ORAL EVERY 6 HOURS PRN
Status: CANCELLED | OUTPATIENT
Start: 2025-04-18

## 2025-04-16 RX ORDER — ALBUTEROL SULFATE 0.83 MG/ML
3 SOLUTION RESPIRATORY (INHALATION) AS NEEDED
Status: CANCELLED | OUTPATIENT
Start: 2025-04-18

## 2025-04-17 ENCOUNTER — LAB (OUTPATIENT)
Dept: LAB | Facility: CLINIC | Age: 60
End: 2025-04-17
Payer: COMMERCIAL

## 2025-04-17 ENCOUNTER — TELEMEDICINE (OUTPATIENT)
Dept: HEMATOLOGY/ONCOLOGY | Facility: CLINIC | Age: 60
End: 2025-04-17
Payer: COMMERCIAL

## 2025-04-17 DIAGNOSIS — Z72.0 TOBACCO USE: ICD-10-CM

## 2025-04-17 DIAGNOSIS — C64.1 RENAL CELL CANCER, RIGHT (MULTI): ICD-10-CM

## 2025-04-17 DIAGNOSIS — Z51.12 ENCOUNTER FOR ANTINEOPLASTIC IMMUNOTHERAPY: ICD-10-CM

## 2025-04-17 DIAGNOSIS — C64.1 RENAL CELL CANCER, RIGHT (MULTI): Primary | ICD-10-CM

## 2025-04-17 LAB
ALBUMIN SERPL BCP-MCNC: 4.1 G/DL (ref 3.4–5)
ALP SERPL-CCNC: 51 U/L (ref 33–110)
ALT SERPL W P-5'-P-CCNC: 9 U/L (ref 7–45)
ANION GAP SERPL CALC-SCNC: 11 MMOL/L (ref 10–20)
AST SERPL W P-5'-P-CCNC: 16 U/L (ref 9–39)
BASOPHILS # BLD AUTO: 0.04 X10*3/UL (ref 0–0.1)
BASOPHILS NFR BLD AUTO: 0.6 %
BILIRUB SERPL-MCNC: 0.5 MG/DL (ref 0–1.2)
BUN SERPL-MCNC: 18 MG/DL (ref 6–23)
CALCIUM SERPL-MCNC: 9.2 MG/DL (ref 8.6–10.3)
CHLORIDE SERPL-SCNC: 103 MMOL/L (ref 98–107)
CO2 SERPL-SCNC: 29 MMOL/L (ref 21–32)
CREAT SERPL-MCNC: 1.01 MG/DL (ref 0.5–1.05)
EGFRCR SERPLBLD CKD-EPI 2021: 64 ML/MIN/1.73M*2
EOSINOPHIL # BLD AUTO: 0.3 X10*3/UL (ref 0–0.7)
EOSINOPHIL NFR BLD AUTO: 4.7 %
ERYTHROCYTE [DISTWIDTH] IN BLOOD BY AUTOMATED COUNT: 13.8 % (ref 11.5–14.5)
GLUCOSE SERPL-MCNC: 123 MG/DL (ref 74–99)
HCT VFR BLD AUTO: 42.2 % (ref 36–46)
HGB BLD-MCNC: 14 G/DL (ref 12–16)
IMM GRANULOCYTES # BLD AUTO: 0.02 X10*3/UL (ref 0–0.7)
IMM GRANULOCYTES NFR BLD AUTO: 0.3 % (ref 0–0.9)
LYMPHOCYTES # BLD AUTO: 1.48 X10*3/UL (ref 1.2–4.8)
LYMPHOCYTES NFR BLD AUTO: 23 %
MCH RBC QN AUTO: 28.7 PG (ref 26–34)
MCHC RBC AUTO-ENTMCNC: 33.2 G/DL (ref 32–36)
MCV RBC AUTO: 87 FL (ref 80–100)
MONOCYTES # BLD AUTO: 0.45 X10*3/UL (ref 0.1–1)
MONOCYTES NFR BLD AUTO: 7 %
NEUTROPHILS # BLD AUTO: 4.15 X10*3/UL (ref 1.2–7.7)
NEUTROPHILS NFR BLD AUTO: 64.4 %
PLATELET # BLD AUTO: 201 X10*3/UL (ref 150–450)
POTASSIUM SERPL-SCNC: 3.7 MMOL/L (ref 3.5–5.3)
PROT SERPL-MCNC: 6.4 G/DL (ref 6.4–8.2)
RBC # BLD AUTO: 4.88 X10*6/UL (ref 4–5.2)
SODIUM SERPL-SCNC: 139 MMOL/L (ref 136–145)
WBC # BLD AUTO: 6.4 X10*3/UL (ref 4.4–11.3)

## 2025-04-17 PROCEDURE — 85025 COMPLETE CBC W/AUTO DIFF WBC: CPT

## 2025-04-17 PROCEDURE — 80053 COMPREHEN METABOLIC PANEL: CPT

## 2025-04-17 PROCEDURE — 36415 COLL VENOUS BLD VENIPUNCTURE: CPT

## 2025-04-17 PROCEDURE — 99215 OFFICE O/P EST HI 40 MIN: CPT | Performed by: STUDENT IN AN ORGANIZED HEALTH CARE EDUCATION/TRAINING PROGRAM

## 2025-04-17 NOTE — PROGRESS NOTES
Oncology Return Visit    Patient ID: Kika Marcelino is a 59 y.o. female who presents for follow up of renal cell carcinoma  Primary Care Provider: Rashaad Palma MD    Patient Timeline    Date  Event    6/27/24 Admitted for COVID PNA, incidental renal mass found     6/28/24 CT kidney shows R renal lesion     10/1/24 MR kidney - 3.9 cm R renal mass     10/9/24 R partial nephrectomy - 3.9 cm ccRCC of R kidney, grade 3. Invade segmental vein. Margins negative. Staged pT3aNX     10/23/24 CT chest without metastasis, resolution of previously identified infiltrate     1/3/25  Cycle 1 Day 1 Adjuvant Pembrolizumab     1/24/25 Cycle 2 day 1 Pembro     2/14/25 Cycle 3 Pembro      3/7/25  Cycle 4 adjuvant pembro     3/28/25 Cycle 5 Pembro    4/18/25 Cycle 6 Pembro       Cancer Staging   Renal cell cancer, right (Multi)  Staging form: Kidney, AJCC 8th Edition  - Clinical stage from 10/9/2024: Stage III (ycT3a, cN0, cM0) - Signed by Gerard Nixon MD on 11/5/2024    [facility]    Kika Marcelino presents unaccompanied for virtual visit. Address and phone number were confirmed prior to the visit. She feels well and has had no side effects of treatment. She reports good appetite and energy level. She reports no fevers, chills, night sweats, dyspnea, chest pain, abdominal pain, nausea, vomiting, diarrhea, constipation, extremity weakness, neuropathy, skin changes/rash, easy bleeding/bruising, vision changes, or headaches.    ROS    A 14 point review of systems was performed and is negative unless otherwise stated in the [facility]    PMH    Medical History[1]     Medications    No current outpatient medications     Fam Hx    Family History[2]    Social Hx    Lives alone in [place]. 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 She smokes about 1/3 ppd. No heavy alcohol use. Occasional marijuana use.     Objective     Physical Examination    Vitals  LMP  (LMP Unknown) Comment: more than ten years ago  BSA: There is no height or weight on file to  calculate BSA.    Performance Status:  Asymptomatic (ECO)    Physical Exam    GENERAL: Well appearing, in no apparent distress. Remainder of examination deferred due to virtual visit     Results    Labs  Lab Results   Component Value Date    WBC 6.4 2025    HGB 14.0 2025    HCT 42.2 2025    MCV 87 2025     2025     Lab Results   Component Value Date    GLUCOSE 123 (H) 2025    CALCIUM 9.2 2025     2025    K 3.7 2025    CO2 29 2025     2025    BUN 18 2025    CREATININE 1.01 2025     Lab Results   Component Value Date    ALT 9 2025    AST 16 2025    ALKPHOS 51 2025    BILITOT 0.5 2025      Lab Results   Component Value Date    TSH 0.63 2025      Imaging    Imaging personally reviewed in EHR and summarized below:    === 04/15/25 ===    CT CHEST ABDOMEN PELVIS W IV CONTRAST    - Impression -  No evidence of metastatic disease    Stable postsurgical scar right kidney    Cholelithiasis    Genomics    Germline:  None    Somatic:  None    Assessment/Plan    Kika Marcelino is a 59 y.o. year old female diagnosed with stage III clear cell renal cell carcinoma ([id number]) and underwent right partial nephrectomy with negative margins in 2024. She started adjuvant pembrolizumab in 2025.    After five cycles of treatment, she has no evidence of recurrence on imaging. She reports no toxicities. She will remain on adjuvant pembrolizumab, with planned 1 year course.    #  Stage III clear cell renal cell carcinoma  - Follows with urology (Dr. Nixon)  - Proceed with cycle 6 adjuvant pembrolizumab  - Imaging with CT C/A/P after cycle 9    # Tobacco Use  - Consider cessation    The above plan was discussed with the patient and family, and they were in agreement. All questions were answered to their satisfaction.    RV 3 weeks with labs (CBC, CMP, TSH)    More than 40 minutes were spent in  face-to-face encounter, review of medical records, coordination of care, and documentation.          [1]   Past Medical History:  Diagnosis Date    COVID-19     Current smoker     Pneumonia 06/27/2024    Renal mass     right    Vision loss    [2]   Family History  Problem Relation Name Age of Onset    Breast cancer Mother Gianna     Cancer Mother Gianna     Heart attack Father      Lung cancer Sister      Cancer Mother's Sister      Cancer Mother's Brother Otoniel     Cancer Sister Apolonia

## 2025-04-18 ENCOUNTER — INFUSION (OUTPATIENT)
Dept: HEMATOLOGY/ONCOLOGY | Facility: CLINIC | Age: 60
End: 2025-04-18
Payer: COMMERCIAL

## 2025-04-18 VITALS
SYSTOLIC BLOOD PRESSURE: 166 MMHG | WEIGHT: 217.59 LBS | OXYGEN SATURATION: 96 % | BODY MASS INDEX: 37.38 KG/M2 | RESPIRATION RATE: 18 BRPM | HEART RATE: 79 BPM | DIASTOLIC BLOOD PRESSURE: 94 MMHG | TEMPERATURE: 98.2 F

## 2025-04-18 DIAGNOSIS — C64.1 RENAL CELL CANCER, RIGHT (MULTI): ICD-10-CM

## 2025-04-18 PROCEDURE — 2500000004 HC RX 250 GENERAL PHARMACY W/ HCPCS (ALT 636 FOR OP/ED): Mod: JZ,TB | Performed by: STUDENT IN AN ORGANIZED HEALTH CARE EDUCATION/TRAINING PROGRAM

## 2025-04-18 PROCEDURE — 96413 CHEMO IV INFUSION 1 HR: CPT

## 2025-04-18 RX ORDER — DIPHENHYDRAMINE HYDROCHLORIDE 50 MG/ML
50 INJECTION, SOLUTION INTRAMUSCULAR; INTRAVENOUS AS NEEDED
Status: DISCONTINUED | OUTPATIENT
Start: 2025-04-18 | End: 2025-04-18 | Stop reason: HOSPADM

## 2025-04-18 RX ORDER — PROCHLORPERAZINE EDISYLATE 5 MG/ML
10 INJECTION INTRAMUSCULAR; INTRAVENOUS EVERY 6 HOURS PRN
Status: DISCONTINUED | OUTPATIENT
Start: 2025-04-18 | End: 2025-04-18 | Stop reason: HOSPADM

## 2025-04-18 RX ORDER — HEPARIN 100 UNIT/ML
500 SYRINGE INTRAVENOUS AS NEEDED
OUTPATIENT
Start: 2025-04-18

## 2025-04-18 RX ORDER — PROCHLORPERAZINE MALEATE 10 MG
10 TABLET ORAL EVERY 6 HOURS PRN
Status: DISCONTINUED | OUTPATIENT
Start: 2025-04-18 | End: 2025-04-18 | Stop reason: HOSPADM

## 2025-04-18 RX ORDER — ALBUTEROL SULFATE 0.83 MG/ML
3 SOLUTION RESPIRATORY (INHALATION) AS NEEDED
Status: DISCONTINUED | OUTPATIENT
Start: 2025-04-18 | End: 2025-04-18 | Stop reason: HOSPADM

## 2025-04-18 RX ORDER — EPINEPHRINE 0.3 MG/.3ML
0.3 INJECTION SUBCUTANEOUS EVERY 5 MIN PRN
Status: DISCONTINUED | OUTPATIENT
Start: 2025-04-18 | End: 2025-04-18 | Stop reason: HOSPADM

## 2025-04-18 RX ORDER — FAMOTIDINE 10 MG/ML
20 INJECTION, SOLUTION INTRAVENOUS ONCE AS NEEDED
Status: DISCONTINUED | OUTPATIENT
Start: 2025-04-18 | End: 2025-04-18 | Stop reason: HOSPADM

## 2025-04-18 RX ORDER — HEPARIN SODIUM,PORCINE/PF 10 UNIT/ML
50 SYRINGE (ML) INTRAVENOUS AS NEEDED
OUTPATIENT
Start: 2025-04-18

## 2025-04-18 RX ADMIN — SODIUM CHLORIDE 200 MG: 9 INJECTION, SOLUTION INTRAVENOUS at 12:11

## 2025-04-18 ASSESSMENT — PAIN SCALES - GENERAL: PAINLEVEL_OUTOF10: 0-NO PAIN

## 2025-04-18 NOTE — PROGRESS NOTES
PT received ordered medication without incident. AVS with updated schedule provided prior to discharge.

## 2025-05-07 ENCOUNTER — LAB (OUTPATIENT)
Dept: LAB | Facility: CLINIC | Age: 60
End: 2025-05-07
Payer: COMMERCIAL

## 2025-05-07 DIAGNOSIS — C64.1 RENAL CELL CANCER, RIGHT (MULTI): ICD-10-CM

## 2025-05-07 LAB
ALBUMIN SERPL BCP-MCNC: 4.3 G/DL (ref 3.4–5)
ALP SERPL-CCNC: 43 U/L (ref 33–110)
ALT SERPL W P-5'-P-CCNC: 9 U/L (ref 7–45)
ANION GAP SERPL CALC-SCNC: 11 MMOL/L (ref 10–20)
AST SERPL W P-5'-P-CCNC: 16 U/L (ref 9–39)
BASOPHILS # BLD AUTO: 0.03 X10*3/UL (ref 0–0.1)
BASOPHILS NFR BLD AUTO: 0.4 %
BILIRUB SERPL-MCNC: 0.4 MG/DL (ref 0–1.2)
BUN SERPL-MCNC: 20 MG/DL (ref 6–23)
CALCIUM SERPL-MCNC: 9.9 MG/DL (ref 8.6–10.3)
CHLORIDE SERPL-SCNC: 104 MMOL/L (ref 98–107)
CO2 SERPL-SCNC: 32 MMOL/L (ref 21–32)
CREAT SERPL-MCNC: 1.01 MG/DL (ref 0.5–1.05)
EGFRCR SERPLBLD CKD-EPI 2021: 64 ML/MIN/1.73M*2
EOSINOPHIL # BLD AUTO: 0.32 X10*3/UL (ref 0–0.7)
EOSINOPHIL NFR BLD AUTO: 4.4 %
ERYTHROCYTE [DISTWIDTH] IN BLOOD BY AUTOMATED COUNT: 13.6 % (ref 11.5–14.5)
GLUCOSE SERPL-MCNC: 92 MG/DL (ref 74–99)
HCT VFR BLD AUTO: 44.5 % (ref 36–46)
HGB BLD-MCNC: 14.5 G/DL (ref 12–16)
IMM GRANULOCYTES # BLD AUTO: 0.02 X10*3/UL (ref 0–0.7)
IMM GRANULOCYTES NFR BLD AUTO: 0.3 % (ref 0–0.9)
LYMPHOCYTES # BLD AUTO: 2.17 X10*3/UL (ref 1.2–4.8)
LYMPHOCYTES NFR BLD AUTO: 29.6 %
MCH RBC QN AUTO: 28 PG (ref 26–34)
MCHC RBC AUTO-ENTMCNC: 32.6 G/DL (ref 32–36)
MCV RBC AUTO: 86 FL (ref 80–100)
MONOCYTES # BLD AUTO: 0.6 X10*3/UL (ref 0.1–1)
MONOCYTES NFR BLD AUTO: 8.2 %
NEUTROPHILS # BLD AUTO: 4.18 X10*3/UL (ref 1.2–7.7)
NEUTROPHILS NFR BLD AUTO: 57.1 %
PLATELET # BLD AUTO: 229 X10*3/UL (ref 150–450)
POTASSIUM SERPL-SCNC: 3.8 MMOL/L (ref 3.5–5.3)
PROT SERPL-MCNC: 6.9 G/DL (ref 6.4–8.2)
RBC # BLD AUTO: 5.17 X10*6/UL (ref 4–5.2)
SODIUM SERPL-SCNC: 143 MMOL/L (ref 136–145)
TSH SERPL-ACNC: 1 MIU/L (ref 0.44–3.98)
WBC # BLD AUTO: 7.3 X10*3/UL (ref 4.4–11.3)

## 2025-05-07 PROCEDURE — 85025 COMPLETE CBC W/AUTO DIFF WBC: CPT

## 2025-05-07 PROCEDURE — 82024 ASSAY OF ACTH: CPT

## 2025-05-07 PROCEDURE — 80053 COMPREHEN METABOLIC PANEL: CPT

## 2025-05-07 PROCEDURE — 84443 ASSAY THYROID STIM HORMONE: CPT

## 2025-05-07 PROCEDURE — 82533 TOTAL CORTISOL: CPT

## 2025-05-07 PROCEDURE — 36415 COLL VENOUS BLD VENIPUNCTURE: CPT

## 2025-05-08 LAB — CORTIS AM PEAK SERPL-MSCNC: 4.6 UG/DL (ref 5–20)

## 2025-05-09 ENCOUNTER — OFFICE VISIT (OUTPATIENT)
Dept: HEMATOLOGY/ONCOLOGY | Facility: CLINIC | Age: 60
End: 2025-05-09
Payer: COMMERCIAL

## 2025-05-09 ENCOUNTER — INFUSION (OUTPATIENT)
Dept: HEMATOLOGY/ONCOLOGY | Facility: CLINIC | Age: 60
End: 2025-05-09
Payer: COMMERCIAL

## 2025-05-09 VITALS
RESPIRATION RATE: 16 BRPM | HEART RATE: 78 BPM | SYSTOLIC BLOOD PRESSURE: 160 MMHG | BODY MASS INDEX: 37.33 KG/M2 | DIASTOLIC BLOOD PRESSURE: 84 MMHG | OXYGEN SATURATION: 96 % | WEIGHT: 217.3 LBS | TEMPERATURE: 98.2 F

## 2025-05-09 DIAGNOSIS — C64.1 RENAL CELL CANCER, RIGHT (MULTI): ICD-10-CM

## 2025-05-09 DIAGNOSIS — C64.1 RENAL CELL CANCER, RIGHT (MULTI): Primary | ICD-10-CM

## 2025-05-09 LAB — ACTH PLAS-MCNC: 8.7 PG/ML (ref 7.2–63.3)

## 2025-05-09 PROCEDURE — 82533 TOTAL CORTISOL: CPT

## 2025-05-09 PROCEDURE — 99214 OFFICE O/P EST MOD 30 MIN: CPT | Mod: 25 | Performed by: NURSE PRACTITIONER

## 2025-05-09 PROCEDURE — 3079F DIAST BP 80-89 MM HG: CPT | Performed by: NURSE PRACTITIONER

## 2025-05-09 PROCEDURE — 3077F SYST BP >= 140 MM HG: CPT | Performed by: NURSE PRACTITIONER

## 2025-05-09 PROCEDURE — 96413 CHEMO IV INFUSION 1 HR: CPT

## 2025-05-09 PROCEDURE — 99214 OFFICE O/P EST MOD 30 MIN: CPT | Performed by: NURSE PRACTITIONER

## 2025-05-09 PROCEDURE — 2500000004 HC RX 250 GENERAL PHARMACY W/ HCPCS (ALT 636 FOR OP/ED): Mod: JZ,TB | Performed by: NURSE PRACTITIONER

## 2025-05-09 RX ORDER — FAMOTIDINE 10 MG/ML
20 INJECTION, SOLUTION INTRAVENOUS ONCE AS NEEDED
Status: DISCONTINUED | OUTPATIENT
Start: 2025-05-09 | End: 2025-05-09 | Stop reason: HOSPADM

## 2025-05-09 RX ORDER — ALBUTEROL SULFATE 0.83 MG/ML
3 SOLUTION RESPIRATORY (INHALATION) AS NEEDED
OUTPATIENT
Start: 2025-07-03

## 2025-05-09 RX ORDER — PROCHLORPERAZINE MALEATE 5 MG
10 TABLET ORAL EVERY 6 HOURS PRN
OUTPATIENT
Start: 2025-07-03

## 2025-05-09 RX ORDER — DIPHENHYDRAMINE HYDROCHLORIDE 50 MG/ML
50 INJECTION, SOLUTION INTRAMUSCULAR; INTRAVENOUS AS NEEDED
OUTPATIENT
Start: 2025-07-03

## 2025-05-09 RX ORDER — FAMOTIDINE 10 MG/ML
20 INJECTION, SOLUTION INTRAVENOUS ONCE AS NEEDED
OUTPATIENT
Start: 2025-07-03

## 2025-05-09 RX ORDER — PROCHLORPERAZINE EDISYLATE 5 MG/ML
10 INJECTION INTRAMUSCULAR; INTRAVENOUS EVERY 6 HOURS PRN
Status: DISCONTINUED | OUTPATIENT
Start: 2025-05-09 | End: 2025-05-09 | Stop reason: HOSPADM

## 2025-05-09 RX ORDER — ALBUTEROL SULFATE 0.83 MG/ML
3 SOLUTION RESPIRATORY (INHALATION) AS NEEDED
Status: DISCONTINUED | OUTPATIENT
Start: 2025-05-09 | End: 2025-05-09 | Stop reason: HOSPADM

## 2025-05-09 RX ORDER — HEPARIN 100 UNIT/ML
500 SYRINGE INTRAVENOUS AS NEEDED
OUTPATIENT
Start: 2025-05-09

## 2025-05-09 RX ORDER — PROCHLORPERAZINE EDISYLATE 5 MG/ML
10 INJECTION INTRAMUSCULAR; INTRAVENOUS EVERY 6 HOURS PRN
Status: CANCELLED | OUTPATIENT
Start: 2025-05-09

## 2025-05-09 RX ORDER — EPINEPHRINE 0.3 MG/.3ML
0.3 INJECTION SUBCUTANEOUS EVERY 5 MIN PRN
Status: DISCONTINUED | OUTPATIENT
Start: 2025-05-09 | End: 2025-05-09 | Stop reason: HOSPADM

## 2025-05-09 RX ORDER — FAMOTIDINE 10 MG/ML
20 INJECTION, SOLUTION INTRAVENOUS ONCE AS NEEDED
Status: CANCELLED | OUTPATIENT
Start: 2025-05-09

## 2025-05-09 RX ORDER — PROCHLORPERAZINE MALEATE 5 MG
10 TABLET ORAL EVERY 6 HOURS PRN
Status: CANCELLED | OUTPATIENT
Start: 2025-05-09

## 2025-05-09 RX ORDER — DIPHENHYDRAMINE HYDROCHLORIDE 50 MG/ML
50 INJECTION, SOLUTION INTRAMUSCULAR; INTRAVENOUS AS NEEDED
Status: CANCELLED | OUTPATIENT
Start: 2025-05-09

## 2025-05-09 RX ORDER — DIPHENHYDRAMINE HYDROCHLORIDE 50 MG/ML
50 INJECTION, SOLUTION INTRAMUSCULAR; INTRAVENOUS AS NEEDED
Status: DISCONTINUED | OUTPATIENT
Start: 2025-05-09 | End: 2025-05-09 | Stop reason: HOSPADM

## 2025-05-09 RX ORDER — ALBUTEROL SULFATE 0.83 MG/ML
3 SOLUTION RESPIRATORY (INHALATION) AS NEEDED
Status: CANCELLED | OUTPATIENT
Start: 2025-05-09

## 2025-05-09 RX ORDER — PROCHLORPERAZINE EDISYLATE 5 MG/ML
10 INJECTION INTRAMUSCULAR; INTRAVENOUS EVERY 6 HOURS PRN
OUTPATIENT
Start: 2025-07-03

## 2025-05-09 RX ORDER — EPINEPHRINE 0.3 MG/.3ML
0.3 INJECTION SUBCUTANEOUS EVERY 5 MIN PRN
OUTPATIENT
Start: 2025-07-03

## 2025-05-09 RX ORDER — HEPARIN SODIUM,PORCINE/PF 10 UNIT/ML
50 SYRINGE (ML) INTRAVENOUS AS NEEDED
OUTPATIENT
Start: 2025-05-09

## 2025-05-09 RX ORDER — EPINEPHRINE 0.3 MG/.3ML
0.3 INJECTION SUBCUTANEOUS EVERY 5 MIN PRN
Status: CANCELLED | OUTPATIENT
Start: 2025-05-09

## 2025-05-09 RX ORDER — PROCHLORPERAZINE MALEATE 10 MG
10 TABLET ORAL EVERY 6 HOURS PRN
Status: DISCONTINUED | OUTPATIENT
Start: 2025-05-09 | End: 2025-05-09 | Stop reason: HOSPADM

## 2025-05-09 RX ADMIN — SODIUM CHLORIDE 200 MG: 9 INJECTION, SOLUTION INTRAVENOUS at 12:44

## 2025-05-09 ASSESSMENT — PAIN SCALES - GENERAL: PAINLEVEL_OUTOF10: 0-NO PAIN

## 2025-05-09 NOTE — SIGNIFICANT EVENT

## 2025-05-09 NOTE — PROGRESS NOTES
Patient ID: Kika Marcelino is a 59 y.o. female.  Attending Physician: Dr. Cecilio Finnegan  Cancer Diagnosis:  Cancer Staging   Renal cell cancer, right (Multi)  Staging form: Kidney, AJCC 8th Edition  - Clinical stage from 10/9/2024: Stage III (ycT3a, cN0, cM0) - Signed by Gerard Nixon MD on 11/5/2024     Current Therapy: Adjuvant Pembrolizumab  200 mg IV day 1  21-day cycles    Genetics: N/A    Subjective      Cancer History:  Oncology History   Renal cell cancer, right (Multi)   8/13/2024 Initial Diagnosis    Renal cell cancer, right (Multi)     10/9/2024 Cancer Staged    Staging form: Kidney, AJCC 8th Edition, Clinical stage from 10/9/2024: Stage III (ycT3a, cN0, cM0) - Signed by Gerard Nixon MD on 11/5/2024     1/3/2025 -  Chemotherapy    Pembrolizumab, 21 Day Cycles       6/27/24 Admitted for COVID PNA, incidental renal mass found    6/28/24 CT kidney shows R renal lesion     10/1/24 MR kidney - 3.9 cm R renal mass     10/9/24 R partial nephrectomy - 3.9 cm ccRCC of R kidney, grade 3. Invade segmental vein. Margins negative. Staged pT3aNX     10/23/24 CT chest without metastasis, resolution of previously identified infiltrate    1/3/25  Cycle 1 Day 1 Adjuvant Pembrolizumab    1/24/25 Cycle 2 day 1 Pembro    2/14/25 Cycle 3 Pembro     3/7/25  Cycle 4 adjuvant pembro    3/28/25 Cycle 5 Pembro    4/18/25 Cycle 6 Pembro    5/9/25  Cycle 7 Pembro    Interval History:  Ms. Marcelino presents today in follow up and consideration of cycle 7. She continues to feel well without new or concerning symptoms otherwise. The remainder of her ROS is otherwise negative.  HPI    Objective    BSA: 2.11 meters squared  /84 (BP Location: Left arm, Patient Position: Sitting)   Pulse 78   Temp 36.8 °C (98.2 °F) (Temporal)   Resp 16   Wt 98.6 kg (217 lb 4.8 oz)   LMP  (LMP Unknown) Comment: more than ten years ago  SpO2 96%   BMI 37.33 kg/m²     Physical Exam  PHYSICAL EXAM:  General: alert, well-dressed in NAD. Speech is  fluent and coherent, words clear. Good insight.   Skin: warm, dry, and pink without cyanosis or nail clubbing. No rash, petechiae, or ecchymoses  HEENT: Normocephalic atraumatic. Sclera white, conjunctiva pink. EOMs intact. Hearing intact to spoken voice. Oral mucosa pink. No visible lesions  Respiratory: Chest expansion symmetric. Breath sounds vesicular in all lobes without crackles, wheezes, or rhonchi bilaterally.  CV: S1S2 audible and crisp without murmurs, rubs, or extra sounds. RRR. Radial pulses strong and regular. Extremities warm and appropriate color for race. No edema bilaterally.  Lymph: no palpable cervical, submandibular, or supraclavicular lymphadenopathy.  GI: abdomen is round, soft, and non-tender. Active bowel sounds.   Psych: engaged, polite, appropriate conversation and eye contact.    Current Medications:  No current outpatient medications on file.     Most Recent Labs:  Results for orders placed or performed in visit on 05/07/25   CBC and Auto Differential    Collection Time: 05/07/25  4:35 PM   Result Value Ref Range    WBC 7.3 4.4 - 11.3 x10*3/uL    RBC 5.17 4.00 - 5.20 x10*6/uL    Hemoglobin 14.5 12.0 - 16.0 g/dL    Hematocrit 44.5 36.0 - 46.0 %    MCV 86 80 - 100 fL    MCH 28.0 26.0 - 34.0 pg    MCHC 32.6 32.0 - 36.0 g/dL    RDW 13.6 11.5 - 14.5 %    Platelets 229 150 - 450 x10*3/uL    Neutrophils % 57.1 40.0 - 80.0 %    Immature Granulocytes %, Automated 0.3 0.0 - 0.9 %    Lymphocytes % 29.6 13.0 - 44.0 %    Monocytes % 8.2 2.0 - 10.0 %    Eosinophils % 4.4 0.0 - 6.0 %    Basophils % 0.4 0.0 - 2.0 %    Neutrophils Absolute 4.18 1.20 - 7.70 x10*3/uL    Immature Granulocytes Absolute, Automated 0.02 0.00 - 0.70 x10*3/uL    Lymphocytes Absolute 2.17 1.20 - 4.80 x10*3/uL    Monocytes Absolute 0.60 0.10 - 1.00 x10*3/uL    Eosinophils Absolute 0.32 0.00 - 0.70 x10*3/uL    Basophils Absolute 0.03 0.00 - 0.10 x10*3/uL   Comprehensive metabolic panel    Collection Time: 05/07/25  4:35 PM   Result  "Value Ref Range    Glucose 92 74 - 99 mg/dL    Sodium 143 136 - 145 mmol/L    Potassium 3.8 3.5 - 5.3 mmol/L    Chloride 104 98 - 107 mmol/L    Bicarbonate 32 21 - 32 mmol/L    Anion Gap 11 10 - 20 mmol/L    Urea Nitrogen 20 6 - 23 mg/dL    Creatinine 1.01 0.50 - 1.05 mg/dL    eGFR 64 >60 mL/min/1.73m*2    Calcium 9.9 8.6 - 10.3 mg/dL    Albumin 4.3 3.4 - 5.0 g/dL    Alkaline Phosphatase 43 33 - 110 U/L    Total Protein 6.9 6.4 - 8.2 g/dL    AST 16 9 - 39 U/L    Bilirubin, Total 0.4 0.0 - 1.2 mg/dL    ALT 9 7 - 45 U/L   Acth    Collection Time: 05/07/25  4:35 PM   Result Value Ref Range    Adrenocorticotropic Hormone (ACTH) 8.7 7.2 - 63.3 pg/mL   Cortisol Am    Collection Time: 05/07/25  4:35 PM   Result Value Ref Range    Cortisol  A.M. 4.6 (L) 5.0 - 20.0 ug/dL   Tsh With Reflex To Free T4 If Abnormal    Collection Time: 05/07/25  4:35 PM   Result Value Ref Range    Thyroid Stimulating Hormone 1.00 0.44 - 3.98 mIU/L      No results found for: \"PSA\"     Performance Status:  ECOG Score: 0- Fully active, able to carry on all pre-disease performance w/o restriction.  Karnofsky Score: 100 - Fully active, able to carry on all pre-disease performed without restriction      Assessment/Plan   Kika Marcelino is a 59 y.o. female with a history of RCC who presents in follow up on adjuvant pembrolizumab.     She was diagnosed with stage III clear cell renal cell carcinoma ([id number]) and underwent right partial nephrectomy with negative margins in October 2024. She started adjuvant pembrolizumab in January 2025.     After five cycles of treatment, she had no evidence of recurrence on imaging. She reports no toxicities. She will remain on adjuvant pembrolizumab, with planned 1 year course.     #  Stage III clear cell renal cell carcinoma  - Follows with urology (Dr. Nixon)  - Proceed with cycle 6 adjuvant pembrolizumab  - Imaging with CT C/A/P after cycle 9     # Tobacco Use  - Consider cessation    RTC 3 weeks with labs and " scans prior    Total time spent on this encounter was 30 minutes, which included preparation, direct time with patient, documentation, and care coordination on the day of visit.    Savita Schreiber, MSN, APRN, AGNP-C, AOCNP  Associate Nurse Practitioner  [facility]

## 2025-05-10 LAB — CORTIS SERPL-MCNC: 5.2 UG/DL (ref 2.5–20)

## 2025-05-14 ENCOUNTER — APPOINTMENT (OUTPATIENT)
Dept: RADIOLOGY | Facility: HOSPITAL | Age: 60
End: 2025-05-14
Payer: COMMERCIAL

## 2025-05-14 ENCOUNTER — APPOINTMENT (OUTPATIENT)
Dept: PRIMARY CARE | Facility: CLINIC | Age: 60
End: 2025-05-14
Payer: COMMERCIAL

## 2025-05-16 ENCOUNTER — TELEPHONE (OUTPATIENT)
Dept: PRIMARY CARE | Facility: CLINIC | Age: 60
End: 2025-05-16
Payer: COMMERCIAL

## 2025-05-16 NOTE — TELEPHONE ENCOUNTER
Patient cx or N/S  for lito 5.14.2025,please call and R/S lito and let me know  when  you have  the  lito date .  Thank you !

## 2025-05-28 ENCOUNTER — LAB (OUTPATIENT)
Dept: LAB | Facility: CLINIC | Age: 60
End: 2025-05-28
Payer: COMMERCIAL

## 2025-05-28 DIAGNOSIS — C64.1 RENAL CELL CANCER, RIGHT (MULTI): ICD-10-CM

## 2025-05-28 LAB
ALBUMIN SERPL BCP-MCNC: 4.5 G/DL (ref 3.4–5)
ALP SERPL-CCNC: 49 U/L (ref 33–110)
ALT SERPL W P-5'-P-CCNC: 10 U/L (ref 7–45)
ANION GAP SERPL CALC-SCNC: 12 MMOL/L (ref 10–20)
AST SERPL W P-5'-P-CCNC: 20 U/L (ref 9–39)
BASOPHILS # BLD AUTO: 0.04 X10*3/UL (ref 0–0.1)
BASOPHILS NFR BLD AUTO: 0.5 %
BILIRUB SERPL-MCNC: 0.6 MG/DL (ref 0–1.2)
BUN SERPL-MCNC: 22 MG/DL (ref 6–23)
CALCIUM SERPL-MCNC: 9.6 MG/DL (ref 8.6–10.3)
CHLORIDE SERPL-SCNC: 100 MMOL/L (ref 98–107)
CO2 SERPL-SCNC: 31 MMOL/L (ref 21–32)
CREAT SERPL-MCNC: 1.12 MG/DL (ref 0.5–1.05)
EGFRCR SERPLBLD CKD-EPI 2021: 57 ML/MIN/1.73M*2
EOSINOPHIL # BLD AUTO: 0.29 X10*3/UL (ref 0–0.7)
EOSINOPHIL NFR BLD AUTO: 3.6 %
ERYTHROCYTE [DISTWIDTH] IN BLOOD BY AUTOMATED COUNT: 13.5 % (ref 11.5–14.5)
GLUCOSE SERPL-MCNC: 118 MG/DL (ref 74–99)
HCT VFR BLD AUTO: 45.1 % (ref 36–46)
HGB BLD-MCNC: 14.9 G/DL (ref 12–16)
IMM GRANULOCYTES # BLD AUTO: 0.02 X10*3/UL (ref 0–0.7)
IMM GRANULOCYTES NFR BLD AUTO: 0.3 % (ref 0–0.9)
LYMPHOCYTES # BLD AUTO: 2.08 X10*3/UL (ref 1.2–4.8)
LYMPHOCYTES NFR BLD AUTO: 26.1 %
MCH RBC QN AUTO: 28.3 PG (ref 26–34)
MCHC RBC AUTO-ENTMCNC: 33 G/DL (ref 32–36)
MCV RBC AUTO: 86 FL (ref 80–100)
MONOCYTES # BLD AUTO: 0.48 X10*3/UL (ref 0.1–1)
MONOCYTES NFR BLD AUTO: 6 %
NEUTROPHILS # BLD AUTO: 5.06 X10*3/UL (ref 1.2–7.7)
NEUTROPHILS NFR BLD AUTO: 63.5 %
PLATELET # BLD AUTO: 230 X10*3/UL (ref 150–450)
POTASSIUM SERPL-SCNC: 3.9 MMOL/L (ref 3.5–5.3)
PROT SERPL-MCNC: 7.2 G/DL (ref 6.4–8.2)
RBC # BLD AUTO: 5.26 X10*6/UL (ref 4–5.2)
SODIUM SERPL-SCNC: 139 MMOL/L (ref 136–145)
WBC # BLD AUTO: 8 X10*3/UL (ref 4.4–11.3)

## 2025-05-28 PROCEDURE — 36415 COLL VENOUS BLD VENIPUNCTURE: CPT

## 2025-05-28 PROCEDURE — 85025 COMPLETE CBC W/AUTO DIFF WBC: CPT

## 2025-05-28 PROCEDURE — 80053 COMPREHEN METABOLIC PANEL: CPT

## 2025-05-30 ENCOUNTER — OFFICE VISIT (OUTPATIENT)
Dept: HEMATOLOGY/ONCOLOGY | Facility: CLINIC | Age: 60
End: 2025-05-30
Payer: COMMERCIAL

## 2025-05-30 ENCOUNTER — INFUSION (OUTPATIENT)
Dept: HEMATOLOGY/ONCOLOGY | Facility: CLINIC | Age: 60
End: 2025-05-30
Payer: COMMERCIAL

## 2025-05-30 VITALS
OXYGEN SATURATION: 98 % | HEART RATE: 93 BPM | SYSTOLIC BLOOD PRESSURE: 165 MMHG | WEIGHT: 218.6 LBS | RESPIRATION RATE: 18 BRPM | TEMPERATURE: 97.7 F | BODY MASS INDEX: 37.55 KG/M2 | DIASTOLIC BLOOD PRESSURE: 102 MMHG

## 2025-05-30 DIAGNOSIS — C64.1 RENAL CELL CANCER, RIGHT (MULTI): ICD-10-CM

## 2025-05-30 DIAGNOSIS — C64.1 RENAL CELL CANCER, RIGHT (MULTI): Primary | ICD-10-CM

## 2025-05-30 PROCEDURE — 99214 OFFICE O/P EST MOD 30 MIN: CPT | Performed by: NURSE PRACTITIONER

## 2025-05-30 PROCEDURE — 2500000004 HC RX 250 GENERAL PHARMACY W/ HCPCS (ALT 636 FOR OP/ED): Mod: JZ,TB | Performed by: NURSE PRACTITIONER

## 2025-05-30 PROCEDURE — 3080F DIAST BP >= 90 MM HG: CPT | Performed by: NURSE PRACTITIONER

## 2025-05-30 PROCEDURE — 3077F SYST BP >= 140 MM HG: CPT | Performed by: NURSE PRACTITIONER

## 2025-05-30 PROCEDURE — 96413 CHEMO IV INFUSION 1 HR: CPT

## 2025-05-30 RX ORDER — EPINEPHRINE 0.3 MG/.3ML
0.3 INJECTION SUBCUTANEOUS EVERY 5 MIN PRN
Status: CANCELLED | OUTPATIENT
Start: 2025-05-30

## 2025-05-30 RX ORDER — HEPARIN SODIUM,PORCINE/PF 10 UNIT/ML
50 SYRINGE (ML) INTRAVENOUS AS NEEDED
OUTPATIENT
Start: 2025-05-30

## 2025-05-30 RX ORDER — FAMOTIDINE 10 MG/ML
20 INJECTION, SOLUTION INTRAVENOUS ONCE AS NEEDED
Status: CANCELLED | OUTPATIENT
Start: 2025-05-30

## 2025-05-30 RX ORDER — EPINEPHRINE 0.3 MG/.3ML
0.3 INJECTION SUBCUTANEOUS EVERY 5 MIN PRN
Status: DISCONTINUED | OUTPATIENT
Start: 2025-05-30 | End: 2025-05-30 | Stop reason: HOSPADM

## 2025-05-30 RX ORDER — DIPHENHYDRAMINE HYDROCHLORIDE 50 MG/ML
50 INJECTION, SOLUTION INTRAMUSCULAR; INTRAVENOUS AS NEEDED
Status: DISCONTINUED | OUTPATIENT
Start: 2025-05-30 | End: 2025-05-30 | Stop reason: HOSPADM

## 2025-05-30 RX ORDER — PROCHLORPERAZINE EDISYLATE 5 MG/ML
10 INJECTION INTRAMUSCULAR; INTRAVENOUS EVERY 6 HOURS PRN
Status: CANCELLED | OUTPATIENT
Start: 2025-05-30

## 2025-05-30 RX ORDER — DIPHENHYDRAMINE HYDROCHLORIDE 50 MG/ML
50 INJECTION, SOLUTION INTRAMUSCULAR; INTRAVENOUS AS NEEDED
Status: CANCELLED | OUTPATIENT
Start: 2025-05-30

## 2025-05-30 RX ORDER — ALBUTEROL SULFATE 0.83 MG/ML
3 SOLUTION RESPIRATORY (INHALATION) AS NEEDED
Status: CANCELLED | OUTPATIENT
Start: 2025-05-30

## 2025-05-30 RX ORDER — PROCHLORPERAZINE MALEATE 10 MG
10 TABLET ORAL EVERY 6 HOURS PRN
Status: DISCONTINUED | OUTPATIENT
Start: 2025-05-30 | End: 2025-05-30 | Stop reason: HOSPADM

## 2025-05-30 RX ORDER — PROCHLORPERAZINE MALEATE 5 MG
10 TABLET ORAL EVERY 6 HOURS PRN
Status: CANCELLED | OUTPATIENT
Start: 2025-05-30

## 2025-05-30 RX ORDER — PROCHLORPERAZINE EDISYLATE 5 MG/ML
10 INJECTION INTRAMUSCULAR; INTRAVENOUS EVERY 6 HOURS PRN
Status: DISCONTINUED | OUTPATIENT
Start: 2025-05-30 | End: 2025-05-30 | Stop reason: HOSPADM

## 2025-05-30 RX ORDER — HEPARIN 100 UNIT/ML
500 SYRINGE INTRAVENOUS AS NEEDED
OUTPATIENT
Start: 2025-05-30

## 2025-05-30 RX ORDER — FAMOTIDINE 10 MG/ML
20 INJECTION, SOLUTION INTRAVENOUS ONCE AS NEEDED
Status: DISCONTINUED | OUTPATIENT
Start: 2025-05-30 | End: 2025-05-30 | Stop reason: HOSPADM

## 2025-05-30 RX ORDER — ALBUTEROL SULFATE 0.83 MG/ML
3 SOLUTION RESPIRATORY (INHALATION) AS NEEDED
Status: DISCONTINUED | OUTPATIENT
Start: 2025-05-30 | End: 2025-05-30 | Stop reason: HOSPADM

## 2025-05-30 RX ADMIN — SODIUM CHLORIDE 200 MG: 9 INJECTION, SOLUTION INTRAVENOUS at 11:52

## 2025-05-30 ASSESSMENT — PAIN SCALES - GENERAL: PAINLEVEL_OUTOF10: 0-NO PAIN

## 2025-05-30 NOTE — PROGRESS NOTES
Patient ID: Kika Marcelino is a 59 y.o. female.  Attending Physician: Dr. Cecilio Finnegan  Cancer Diagnosis:  Cancer Staging   Renal cell cancer, right (Multi)  Staging form: Kidney, AJCC 8th Edition  - Clinical stage from 10/9/2024: Stage III (ycT3a, cN0, cM0) - Signed by Gerard Nixon MD on 11/5/2024     Current Therapy: Adjuvant Pembrolizumab  200 mg IV day 1  21-day cycles    Genetics: N/A    Subjective      Cancer History:  Oncology History   Renal cell cancer, right (Multi)   8/13/2024 Initial Diagnosis    Renal cell cancer, right (Multi)     10/9/2024 Cancer Staged    Staging form: Kidney, AJCC 8th Edition, Clinical stage from 10/9/2024: Stage III (ycT3a, cN0, cM0) - Signed by Gerard Nixon MD on 11/5/2024     1/3/2025 -  Chemotherapy    Pembrolizumab, 21 Day Cycles       6/27/24 Admitted for COVID PNA, incidental renal mass found    6/28/24 CT kidney shows R renal lesion     10/1/24 MR kidney - 3.9 cm R renal mass     10/9/24 R partial nephrectomy - 3.9 cm ccRCC of R kidney, grade 3. Invade segmental vein. Margins negative. Staged pT3aNX     10/23/24 CT chest without metastasis, resolution of previously identified infiltrate    1/3/25  Cycle 1 Day 1 Adjuvant Pembrolizumab    1/24/25 Cycle 2 day 1 Pembro    2/14/25 Cycle 3 Pembro     3/7/25  Cycle 4 adjuvant pembro    3/28/25 Cycle 5 Pembro    4/18/25 Cycle 6 Pembro    5/9/25  Cycle 7 Pembro    Interval History:  Ms. Marcelino presents today in follow up and consideration of cycle 8. She continues to feel well without new or concerning symptoms. [place] is headed to [place] in June. The remainder of her ROS is otherwise negative.  HPI    Objective    BSA: 2.12 meters squared  BP (!) 165/102 (BP Location: Left arm, Patient Position: Sitting)   Pulse 93   Temp 36.5 °C (97.7 °F) (Temporal)   Resp 18   Wt 99.2 kg (218 lb 9.6 oz)   LMP  (LMP Unknown) Comment: more than ten years ago  SpO2 98%   BMI 37.55 kg/m²     Physical Exam  PHYSICAL EXAM:  General:  alert, well-dressed in NAD. Speech is fluent and coherent, words clear. Good insight.   Skin: warm, dry, and pink without cyanosis or nail clubbing. No rash, petechiae, or ecchymoses  HEENT: Normocephalic atraumatic. Sclera white, conjunctiva pink. EOMs intact. Hearing intact to spoken voice. Oral mucosa pink. No visible lesions  Respiratory: Chest expansion symmetric. Breath sounds vesicular in all lobes without crackles, wheezes, or rhonchi bilaterally.  CV: S1S2 audible and crisp without murmurs, rubs, or extra sounds. RRR. Radial pulses strong and regular. Extremities warm and appropriate color for race. No edema bilaterally.  Lymph: no palpable cervical, submandibular, or supraclavicular lymphadenopathy.  GI: abdomen is round, soft, and non-tender. Active bowel sounds.   Psych: engaged, polite, appropriate conversation and eye contact.    Current Medications:  No current outpatient medications on file.     Most Recent Labs:  Results for orders placed or performed in visit on 05/28/25   CBC and Auto Differential    Collection Time: 05/28/25  4:38 PM   Result Value Ref Range    WBC 8.0 4.4 - 11.3 x10*3/uL    RBC 5.26 (H) 4.00 - 5.20 x10*6/uL    Hemoglobin 14.9 12.0 - 16.0 g/dL    Hematocrit 45.1 36.0 - 46.0 %    MCV 86 80 - 100 fL    MCH 28.3 26.0 - 34.0 pg    MCHC 33.0 32.0 - 36.0 g/dL    RDW 13.5 11.5 - 14.5 %    Platelets 230 150 - 450 x10*3/uL    Neutrophils % 63.5 40.0 - 80.0 %    Immature Granulocytes %, Automated 0.3 0.0 - 0.9 %    Lymphocytes % 26.1 13.0 - 44.0 %    Monocytes % 6.0 2.0 - 10.0 %    Eosinophils % 3.6 0.0 - 6.0 %    Basophils % 0.5 0.0 - 2.0 %    Neutrophils Absolute 5.06 1.20 - 7.70 x10*3/uL    Immature Granulocytes Absolute, Automated 0.02 0.00 - 0.70 x10*3/uL    Lymphocytes Absolute 2.08 1.20 - 4.80 x10*3/uL    Monocytes Absolute 0.48 0.10 - 1.00 x10*3/uL    Eosinophils Absolute 0.29 0.00 - 0.70 x10*3/uL    Basophils Absolute 0.04 0.00 - 0.10 x10*3/uL   Comprehensive metabolic panel     "Collection Time: 05/28/25  4:38 PM   Result Value Ref Range    Glucose 118 (H) 74 - 99 mg/dL    Sodium 139 136 - 145 mmol/L    Potassium 3.9 3.5 - 5.3 mmol/L    Chloride 100 98 - 107 mmol/L    Bicarbonate 31 21 - 32 mmol/L    Anion Gap 12 10 - 20 mmol/L    Urea Nitrogen 22 6 - 23 mg/dL    Creatinine 1.12 (H) 0.50 - 1.05 mg/dL    eGFR 57 (L) >60 mL/min/1.73m*2    Calcium 9.6 8.6 - 10.3 mg/dL    Albumin 4.5 3.4 - 5.0 g/dL    Alkaline Phosphatase 49 33 - 110 U/L    Total Protein 7.2 6.4 - 8.2 g/dL    AST 20 9 - 39 U/L    Bilirubin, Total 0.6 0.0 - 1.2 mg/dL    ALT 10 7 - 45 U/L      No results found for: \"PSA\"     Performance Status:  ECOG Score: 0- Fully active, able to carry on all pre-disease performance w/o restriction.  Karnofsky Score: 100 - Fully active, able to carry on all pre-disease performed without restriction      Assessment/Plan   Kika Marcelino is a 59 y.o. female with a history of RCC who presents in follow up on adjuvant pembrolizumab.     She was diagnosed with stage III clear cell renal cell carcinoma ([id number]) and underwent right partial nephrectomy with negative margins in October 2024. She started adjuvant pembrolizumab in January 2025.     After five cycles of treatment, she had no evidence of recurrence on imaging. She reports no toxicities. She will remain on adjuvant pembrolizumab, with planned 1 year course.     #  Stage III clear cell renal cell carcinoma  - Follows with urology (Dr. Nixon)  - Proceed with cycle 8 adjuvant pembrolizumab  - Imaging with CT C/A/P after cycle 9     # Tobacco Use  - Consider cessation    RTC 3 weeks with labs and scans prior    Savita Schreiber, MSN, APRN, AGNP-C, AOCNP  Associate Nurse Practitioner  [facility]   "

## 2025-05-30 NOTE — SIGNIFICANT EVENT

## 2025-07-03 ENCOUNTER — INFUSION (OUTPATIENT)
Dept: HEMATOLOGY/ONCOLOGY | Facility: CLINIC | Age: 60
End: 2025-07-03
Payer: COMMERCIAL

## 2025-07-03 ENCOUNTER — LAB (OUTPATIENT)
Dept: LAB | Facility: CLINIC | Age: 60
End: 2025-07-03
Payer: COMMERCIAL

## 2025-07-03 ENCOUNTER — OFFICE VISIT (OUTPATIENT)
Dept: HEMATOLOGY/ONCOLOGY | Facility: CLINIC | Age: 60
End: 2025-07-03
Payer: COMMERCIAL

## 2025-07-03 VITALS
OXYGEN SATURATION: 97 % | TEMPERATURE: 97.9 F | DIASTOLIC BLOOD PRESSURE: 84 MMHG | HEART RATE: 70 BPM | BODY MASS INDEX: 37.33 KG/M2 | SYSTOLIC BLOOD PRESSURE: 162 MMHG | WEIGHT: 217.3 LBS | RESPIRATION RATE: 18 BRPM

## 2025-07-03 DIAGNOSIS — C64.1 RENAL CELL CANCER, RIGHT (MULTI): ICD-10-CM

## 2025-07-03 DIAGNOSIS — C64.1 RENAL CELL CANCER, RIGHT (MULTI): Primary | ICD-10-CM

## 2025-07-03 LAB
ALBUMIN SERPL BCP-MCNC: 4.3 G/DL (ref 3.4–5)
ALP SERPL-CCNC: 51 U/L (ref 33–110)
ALT SERPL W P-5'-P-CCNC: 10 U/L (ref 7–45)
ANION GAP SERPL CALC-SCNC: 10 MMOL/L (ref 10–20)
AST SERPL W P-5'-P-CCNC: 20 U/L (ref 9–39)
BASOPHILS # BLD AUTO: 0.06 X10*3/UL (ref 0–0.1)
BASOPHILS NFR BLD AUTO: 0.9 %
BILIRUB SERPL-MCNC: 0.8 MG/DL (ref 0–1.2)
BUN SERPL-MCNC: 17 MG/DL (ref 6–23)
CALCIUM SERPL-MCNC: 9.6 MG/DL (ref 8.6–10.3)
CHLORIDE SERPL-SCNC: 104 MMOL/L (ref 98–107)
CO2 SERPL-SCNC: 29 MMOL/L (ref 21–32)
CREAT SERPL-MCNC: 0.98 MG/DL (ref 0.5–1.05)
EGFRCR SERPLBLD CKD-EPI 2021: 67 ML/MIN/1.73M*2
EOSINOPHIL # BLD AUTO: 0.36 X10*3/UL (ref 0–0.7)
EOSINOPHIL NFR BLD AUTO: 5.5 %
ERYTHROCYTE [DISTWIDTH] IN BLOOD BY AUTOMATED COUNT: 13.7 % (ref 11.5–14.5)
GLUCOSE SERPL-MCNC: 99 MG/DL (ref 74–99)
HCT VFR BLD AUTO: 44.6 % (ref 36–46)
HGB BLD-MCNC: 14.7 G/DL (ref 12–16)
IMM GRANULOCYTES # BLD AUTO: 0.03 X10*3/UL (ref 0–0.7)
IMM GRANULOCYTES NFR BLD AUTO: 0.5 % (ref 0–0.9)
LYMPHOCYTES # BLD AUTO: 1.48 X10*3/UL (ref 1.2–4.8)
LYMPHOCYTES NFR BLD AUTO: 22.4 %
MCH RBC QN AUTO: 28.1 PG (ref 26–34)
MCHC RBC AUTO-ENTMCNC: 33 G/DL (ref 32–36)
MCV RBC AUTO: 85 FL (ref 80–100)
MONOCYTES # BLD AUTO: 0.5 X10*3/UL (ref 0.1–1)
MONOCYTES NFR BLD AUTO: 7.6 %
NEUTROPHILS # BLD AUTO: 4.17 X10*3/UL (ref 1.2–7.7)
NEUTROPHILS NFR BLD AUTO: 63.1 %
NRBC BLD-RTO: ABNORMAL /100{WBCS}
PLATELET # BLD AUTO: 220 X10*3/UL (ref 150–450)
POTASSIUM SERPL-SCNC: 3.8 MMOL/L (ref 3.5–5.3)
PROT SERPL-MCNC: 7 G/DL (ref 6.4–8.2)
RBC # BLD AUTO: 5.23 X10*6/UL (ref 4–5.2)
SODIUM SERPL-SCNC: 139 MMOL/L (ref 136–145)
WBC # BLD AUTO: 6.6 X10*3/UL (ref 4.4–11.3)

## 2025-07-03 PROCEDURE — 2500000004 HC RX 250 GENERAL PHARMACY W/ HCPCS (ALT 636 FOR OP/ED): Performed by: NURSE PRACTITIONER

## 2025-07-03 PROCEDURE — 84443 ASSAY THYROID STIM HORMONE: CPT

## 2025-07-03 PROCEDURE — 3077F SYST BP >= 140 MM HG: CPT | Performed by: NURSE PRACTITIONER

## 2025-07-03 PROCEDURE — 85025 COMPLETE CBC W/AUTO DIFF WBC: CPT

## 2025-07-03 PROCEDURE — 99214 OFFICE O/P EST MOD 30 MIN: CPT | Performed by: NURSE PRACTITIONER

## 2025-07-03 PROCEDURE — 36415 COLL VENOUS BLD VENIPUNCTURE: CPT

## 2025-07-03 PROCEDURE — 82533 TOTAL CORTISOL: CPT

## 2025-07-03 PROCEDURE — 99214 OFFICE O/P EST MOD 30 MIN: CPT | Mod: 25 | Performed by: NURSE PRACTITIONER

## 2025-07-03 PROCEDURE — 82024 ASSAY OF ACTH: CPT

## 2025-07-03 PROCEDURE — 96413 CHEMO IV INFUSION 1 HR: CPT

## 2025-07-03 PROCEDURE — 3079F DIAST BP 80-89 MM HG: CPT | Performed by: NURSE PRACTITIONER

## 2025-07-03 PROCEDURE — 84075 ASSAY ALKALINE PHOSPHATASE: CPT

## 2025-07-03 RX ORDER — HEPARIN 100 UNIT/ML
500 SYRINGE INTRAVENOUS AS NEEDED
OUTPATIENT
Start: 2025-07-03

## 2025-07-03 RX ORDER — HEPARIN SODIUM,PORCINE/PF 10 UNIT/ML
50 SYRINGE (ML) INTRAVENOUS AS NEEDED
OUTPATIENT
Start: 2025-07-03

## 2025-07-03 RX ORDER — PROCHLORPERAZINE EDISYLATE 5 MG/ML
10 INJECTION INTRAMUSCULAR; INTRAVENOUS EVERY 6 HOURS PRN
Status: DISCONTINUED | OUTPATIENT
Start: 2025-07-03 | End: 2025-07-03 | Stop reason: HOSPADM

## 2025-07-03 RX ORDER — PROCHLORPERAZINE MALEATE 10 MG
10 TABLET ORAL EVERY 6 HOURS PRN
Status: DISCONTINUED | OUTPATIENT
Start: 2025-07-03 | End: 2025-07-03 | Stop reason: HOSPADM

## 2025-07-03 RX ORDER — ALBUTEROL SULFATE 0.83 MG/ML
3 SOLUTION RESPIRATORY (INHALATION) AS NEEDED
Status: DISCONTINUED | OUTPATIENT
Start: 2025-07-03 | End: 2025-07-03 | Stop reason: HOSPADM

## 2025-07-03 RX ORDER — EPINEPHRINE 0.3 MG/.3ML
0.3 INJECTION SUBCUTANEOUS EVERY 5 MIN PRN
Status: DISCONTINUED | OUTPATIENT
Start: 2025-07-03 | End: 2025-07-03 | Stop reason: HOSPADM

## 2025-07-03 RX ORDER — FAMOTIDINE 10 MG/ML
20 INJECTION, SOLUTION INTRAVENOUS ONCE AS NEEDED
Status: DISCONTINUED | OUTPATIENT
Start: 2025-07-03 | End: 2025-07-03 | Stop reason: HOSPADM

## 2025-07-03 RX ORDER — DIPHENHYDRAMINE HYDROCHLORIDE 50 MG/ML
50 INJECTION, SOLUTION INTRAMUSCULAR; INTRAVENOUS AS NEEDED
Status: DISCONTINUED | OUTPATIENT
Start: 2025-07-03 | End: 2025-07-03 | Stop reason: HOSPADM

## 2025-07-03 RX ADMIN — SODIUM CHLORIDE 200 MG: 9 INJECTION, SOLUTION INTRAVENOUS at 12:47

## 2025-07-03 ASSESSMENT — COLUMBIA-SUICIDE SEVERITY RATING SCALE - C-SSRS
2. HAVE YOU ACTUALLY HAD ANY THOUGHTS OF KILLING YOURSELF?: NO
1. IN THE PAST MONTH, HAVE YOU WISHED YOU WERE DEAD OR WISHED YOU COULD GO TO SLEEP AND NOT WAKE UP?: NO
6. HAVE YOU EVER DONE ANYTHING, STARTED TO DO ANYTHING, OR PREPARED TO DO ANYTHING TO END YOUR LIFE?: NO

## 2025-07-03 ASSESSMENT — PAIN SCALES - GENERAL: PAINLEVEL_OUTOF10: 0-NO PAIN

## 2025-07-03 NOTE — PROGRESS NOTES
Patient ID: Kika Marcelino is a 59 y.o. female.  Attending Physician: Dr. Cecilio Finnegan  Cancer Diagnosis:  Cancer Staging   Renal cell cancer, right (Multi)  Staging form: Kidney, AJCC 8th Edition  - Clinical stage from 10/9/2024: Stage III (ycT3a, cN0, cM0) - Signed by Gerard Nixon MD on 11/5/2024     Current Therapy: Adjuvant Pembrolizumab  200 mg IV day 1  21-day cycles    Genetics: N/A    Subjective      Cancer History:  Oncology History   Renal cell cancer, right (Multi)   8/13/2024 Initial Diagnosis    Renal cell cancer, right (Multi)     10/9/2024 Cancer Staged    Staging form: Kidney, AJCC 8th Edition, Clinical stage from 10/9/2024: Stage III (ycT3a, cN0, cM0) - Signed by Gerard Nixon MD on 11/5/2024     1/3/2025 -  Chemotherapy    Pembrolizumab, 21 Day Cycles       6/27/24 Admitted for COVID PNA, incidental renal mass found    6/28/24 CT kidney shows R renal lesion     10/1/24 MR kidney - 3.9 cm R renal mass     10/9/24 R partial nephrectomy - 3.9 cm ccRCC of R kidney, grade 3. Invade segmental vein. Margins negative. Staged pT3aNX     10/23/24 CT chest without metastasis, resolution of previously identified infiltrate    1/3/25  Cycle 1 Day 1 Adjuvant Pembrolizumab    1/24/25 Cycle 2 day 1 Pembro    2/14/25 Cycle 3 Pembro     3/7/25  Cycle 4 adjuvant pembro    3/28/25 Cycle 5 Pembro    4/18/25 Cycle 6 Pembro    5/9/25  Cycle 7 Pembro    5/30/25 Cycle 8 Pembro    7/3/25  Cycle 9 Pembro    Interval History:  Ms. Marcelino presents today in follow up and consideration of cycle 9. She continues to feel well without new or concerning symptoms. She recently returned from a trip to [facility]. The remainder of her ROS is otherwise negative.  HPI    Objective    BSA: 2.11 meters squared  /84 (BP Location: Left arm, Patient Position: Sitting, BP Cuff Size: Large adult)   Pulse 70   Temp 36.6 °C (97.9 °F) (Temporal)   Resp 18   Wt 98.6 kg (217 lb 4.8 oz)   LMP  (LMP Unknown) Comment: more than ten years  ago  SpO2 97%   BMI 37.33 kg/m²     Physical Exam  PHYSICAL EXAM:  General: alert, well-dressed in NAD. Speech is fluent and coherent, words clear. Good insight.   Skin: warm, dry, and pink without cyanosis or nail clubbing. No rash, petechiae, or ecchymoses  HEENT: Normocephalic atraumatic. Sclera white, conjunctiva pink. EOMs intact. Hearing intact to spoken voice. Oral mucosa pink. No visible lesions  Respiratory: Chest expansion symmetric. Breath sounds vesicular in all lobes without crackles, wheezes, or rhonchi bilaterally.  CV: S1S2 audible and crisp without murmurs, rubs, or extra sounds. RRR. Radial pulses strong and regular. Extremities warm and appropriate color for race. No edema bilaterally.  Lymph: no palpable cervical, submandibular, or supraclavicular lymphadenopathy.  GI: abdomen is round, soft, and non-tender. Active bowel sounds.   Psych: engaged, polite, appropriate conversation and eye contact.    Current Medications:  No current outpatient medications on file.     Most Recent Labs:  Results for orders placed or performed in visit on 05/28/25   CBC and Auto Differential    Collection Time: 05/28/25  4:38 PM   Result Value Ref Range    WBC 8.0 4.4 - 11.3 x10*3/uL    RBC 5.26 (H) 4.00 - 5.20 x10*6/uL    Hemoglobin 14.9 12.0 - 16.0 g/dL    Hematocrit 45.1 36.0 - 46.0 %    MCV 86 80 - 100 fL    MCH 28.3 26.0 - 34.0 pg    MCHC 33.0 32.0 - 36.0 g/dL    RDW 13.5 11.5 - 14.5 %    Platelets 230 150 - 450 x10*3/uL    Neutrophils % 63.5 40.0 - 80.0 %    Immature Granulocytes %, Automated 0.3 0.0 - 0.9 %    Lymphocytes % 26.1 13.0 - 44.0 %    Monocytes % 6.0 2.0 - 10.0 %    Eosinophils % 3.6 0.0 - 6.0 %    Basophils % 0.5 0.0 - 2.0 %    Neutrophils Absolute 5.06 1.20 - 7.70 x10*3/uL    Immature Granulocytes Absolute, Automated 0.02 0.00 - 0.70 x10*3/uL    Lymphocytes Absolute 2.08 1.20 - 4.80 x10*3/uL    Monocytes Absolute 0.48 0.10 - 1.00 x10*3/uL    Eosinophils Absolute 0.29 0.00 - 0.70 x10*3/uL     "Basophils Absolute 0.04 0.00 - 0.10 x10*3/uL   Comprehensive metabolic panel    Collection Time: 05/28/25  4:38 PM   Result Value Ref Range    Glucose 118 (H) 74 - 99 mg/dL    Sodium 139 136 - 145 mmol/L    Potassium 3.9 3.5 - 5.3 mmol/L    Chloride 100 98 - 107 mmol/L    Bicarbonate 31 21 - 32 mmol/L    Anion Gap 12 10 - 20 mmol/L    Urea Nitrogen 22 6 - 23 mg/dL    Creatinine 1.12 (H) 0.50 - 1.05 mg/dL    eGFR 57 (L) >60 mL/min/1.73m*2    Calcium 9.6 8.6 - 10.3 mg/dL    Albumin 4.5 3.4 - 5.0 g/dL    Alkaline Phosphatase 49 33 - 110 U/L    Total Protein 7.2 6.4 - 8.2 g/dL    AST 20 9 - 39 U/L    Bilirubin, Total 0.6 0.0 - 1.2 mg/dL    ALT 10 7 - 45 U/L      No results found for: \"PSA\"     Performance Status:  ECOG Score: 0- Fully active, able to carry on all pre-disease performance w/o restriction.  Karnofsky Score: 100 - Fully active, able to carry on all pre-disease performed without restriction      Assessment/Plan   Kika Marcelino is a 59 y.o. female with a history of RCC who presents in follow up on adjuvant pembrolizumab.     She was diagnosed with stage III clear cell renal cell carcinoma ([id number]) and underwent right partial nephrectomy with negative margins in October 2024. She started adjuvant pembrolizumab in January 2025.     After five cycles of treatment, she had no evidence of recurrence on imaging. She reports no toxicities. She will remain on adjuvant pembrolizumab, with planned 1 year course.     #  Stage III clear cell renal cell carcinoma  - Follows with urology (Dr. Nixon)  - Proceed with cycle 9 adjuvant pembrolizumab  - Imaging with CT C/A/P after cycle 9, ordered     # Tobacco Use  - Consider cessation    RTC 3 weeks with labs and scans prior    Total time spent on this encounter was 30 minutes, which included preparation, direct time with patient, documentation, and care coordination on the day of visit.    Savita Schreiber, MSN, APRN, AGNP-C, AOCNP  Associate Nurse Practitioner  [facility]

## 2025-07-03 NOTE — SIGNIFICANT EVENT
07/03/25 1226   Prechemo Checklist   Has the patient been in the hospital, ED, or urgent care since last date of service No   Chemo/Immuno Consent Completed and Signed Yes   Protocol/Indications Verified Yes   Confirmed to previous date/time of medication Yes   Compared to previous dose Yes   All medications are dated accurately Yes   Pregnancy Test Negative Yes   Parameters Met Yes   BSA/Weight-Height Verified Yes   Dose Calculations Verified (current, total, cumulative) Yes

## 2025-07-04 LAB
CORTIS AM PEAK SERPL-MSCNC: 12.3 UG/DL (ref 5–20)
TSH SERPL-ACNC: 0.8 MIU/L (ref 0.44–3.98)

## 2025-07-06 LAB — ACTH PLAS-MCNC: 11.1 PG/ML (ref 7.2–63.3)

## 2025-07-25 ENCOUNTER — INFUSION (OUTPATIENT)
Dept: HEMATOLOGY/ONCOLOGY | Facility: CLINIC | Age: 60
End: 2025-07-25
Payer: COMMERCIAL

## 2025-07-25 ENCOUNTER — LAB (OUTPATIENT)
Dept: LAB | Facility: CLINIC | Age: 60
End: 2025-07-25
Payer: COMMERCIAL

## 2025-07-25 ENCOUNTER — OFFICE VISIT (OUTPATIENT)
Dept: HEMATOLOGY/ONCOLOGY | Facility: CLINIC | Age: 60
End: 2025-07-25
Payer: COMMERCIAL

## 2025-07-25 VITALS
BODY MASS INDEX: 37.34 KG/M2 | OXYGEN SATURATION: 97 % | TEMPERATURE: 96.8 F | RESPIRATION RATE: 18 BRPM | SYSTOLIC BLOOD PRESSURE: 211 MMHG | WEIGHT: 217.37 LBS | HEART RATE: 68 BPM | DIASTOLIC BLOOD PRESSURE: 112 MMHG

## 2025-07-25 DIAGNOSIS — C64.1 RENAL CELL CANCER, RIGHT (MULTI): ICD-10-CM

## 2025-07-25 LAB
ALBUMIN SERPL BCP-MCNC: 4.2 G/DL (ref 3.4–5)
ALP SERPL-CCNC: 53 U/L (ref 33–110)
ALT SERPL W P-5'-P-CCNC: 9 U/L (ref 7–45)
ANION GAP SERPL CALC-SCNC: 10 MMOL/L (ref 10–20)
AST SERPL W P-5'-P-CCNC: 17 U/L (ref 9–39)
BASOPHILS # BLD AUTO: 0.05 X10*3/UL (ref 0–0.1)
BASOPHILS NFR BLD AUTO: 0.8 %
BILIRUB SERPL-MCNC: 0.7 MG/DL (ref 0–1.2)
BUN SERPL-MCNC: 18 MG/DL (ref 6–23)
CALCIUM SERPL-MCNC: 9.7 MG/DL (ref 8.6–10.3)
CHLORIDE SERPL-SCNC: 104 MMOL/L (ref 98–107)
CO2 SERPL-SCNC: 26 MMOL/L (ref 21–32)
CREAT SERPL-MCNC: 0.96 MG/DL (ref 0.5–1.05)
EGFRCR SERPLBLD CKD-EPI 2021: 68 ML/MIN/1.73M*2
EOSINOPHIL # BLD AUTO: 0.3 X10*3/UL (ref 0–0.7)
EOSINOPHIL NFR BLD AUTO: 4.9 %
ERYTHROCYTE [DISTWIDTH] IN BLOOD BY AUTOMATED COUNT: 13.1 % (ref 11.5–14.5)
GLUCOSE SERPL-MCNC: 100 MG/DL (ref 74–99)
HCT VFR BLD AUTO: 44.5 % (ref 36–46)
HGB BLD-MCNC: 15 G/DL (ref 12–16)
IMM GRANULOCYTES # BLD AUTO: 0.01 X10*3/UL (ref 0–0.7)
IMM GRANULOCYTES NFR BLD AUTO: 0.2 % (ref 0–0.9)
LYMPHOCYTES # BLD AUTO: 1.66 X10*3/UL (ref 1.2–4.8)
LYMPHOCYTES NFR BLD AUTO: 26.9 %
MCH RBC QN AUTO: 28.5 PG (ref 26–34)
MCHC RBC AUTO-ENTMCNC: 33.7 G/DL (ref 32–36)
MCV RBC AUTO: 84 FL (ref 80–100)
MONOCYTES # BLD AUTO: 0.46 X10*3/UL (ref 0.1–1)
MONOCYTES NFR BLD AUTO: 7.4 %
NEUTROPHILS # BLD AUTO: 3.7 X10*3/UL (ref 1.2–7.7)
NEUTROPHILS NFR BLD AUTO: 59.8 %
NRBC BLD-RTO: ABNORMAL /100{WBCS}
PLATELET # BLD AUTO: 201 X10*3/UL (ref 150–450)
POTASSIUM SERPL-SCNC: 4.1 MMOL/L (ref 3.5–5.3)
PROT SERPL-MCNC: 6.8 G/DL (ref 6.4–8.2)
RBC # BLD AUTO: 5.27 X10*6/UL (ref 4–5.2)
SODIUM SERPL-SCNC: 136 MMOL/L (ref 136–145)
WBC # BLD AUTO: 6.2 X10*3/UL (ref 4.4–11.3)

## 2025-07-25 PROCEDURE — 85025 COMPLETE CBC W/AUTO DIFF WBC: CPT

## 2025-07-25 PROCEDURE — 99215 OFFICE O/P EST HI 40 MIN: CPT | Performed by: NURSE PRACTITIONER

## 2025-07-25 PROCEDURE — 96413 CHEMO IV INFUSION 1 HR: CPT

## 2025-07-25 PROCEDURE — 2500000004 HC RX 250 GENERAL PHARMACY W/ HCPCS (ALT 636 FOR OP/ED): Mod: JZ,TB | Performed by: NURSE PRACTITIONER

## 2025-07-25 PROCEDURE — 36415 COLL VENOUS BLD VENIPUNCTURE: CPT

## 2025-07-25 PROCEDURE — 80053 COMPREHEN METABOLIC PANEL: CPT

## 2025-07-25 RX ORDER — PROCHLORPERAZINE EDISYLATE 5 MG/ML
10 INJECTION INTRAMUSCULAR; INTRAVENOUS EVERY 6 HOURS PRN
Status: DISCONTINUED | OUTPATIENT
Start: 2025-07-25 | End: 2025-07-25 | Stop reason: HOSPADM

## 2025-07-25 RX ORDER — DIPHENHYDRAMINE HYDROCHLORIDE 50 MG/ML
50 INJECTION, SOLUTION INTRAMUSCULAR; INTRAVENOUS AS NEEDED
Status: CANCELLED | OUTPATIENT
Start: 2025-07-25

## 2025-07-25 RX ORDER — FAMOTIDINE 10 MG/ML
20 INJECTION, SOLUTION INTRAVENOUS ONCE AS NEEDED
Status: DISCONTINUED | OUTPATIENT
Start: 2025-07-25 | End: 2025-07-25 | Stop reason: HOSPADM

## 2025-07-25 RX ORDER — TRIAMCINOLONE ACETONIDE 1 MG/G
CREAM TOPICAL 2 TIMES DAILY
Qty: 453.6 G | Refills: 1 | Status: SHIPPED | OUTPATIENT
Start: 2025-07-25

## 2025-07-25 RX ORDER — HEPARIN 100 UNIT/ML
500 SYRINGE INTRAVENOUS AS NEEDED
OUTPATIENT
Start: 2025-07-25

## 2025-07-25 RX ORDER — PROCHLORPERAZINE MALEATE 10 MG
10 TABLET ORAL EVERY 6 HOURS PRN
Status: DISCONTINUED | OUTPATIENT
Start: 2025-07-25 | End: 2025-07-25 | Stop reason: HOSPADM

## 2025-07-25 RX ORDER — EPINEPHRINE 0.3 MG/.3ML
0.3 INJECTION SUBCUTANEOUS EVERY 5 MIN PRN
Status: DISCONTINUED | OUTPATIENT
Start: 2025-07-25 | End: 2025-07-25 | Stop reason: HOSPADM

## 2025-07-25 RX ORDER — ALBUTEROL SULFATE 0.83 MG/ML
3 SOLUTION RESPIRATORY (INHALATION) AS NEEDED
Status: DISCONTINUED | OUTPATIENT
Start: 2025-07-25 | End: 2025-07-25 | Stop reason: HOSPADM

## 2025-07-25 RX ORDER — EPINEPHRINE 0.3 MG/.3ML
0.3 INJECTION SUBCUTANEOUS EVERY 5 MIN PRN
Status: CANCELLED | OUTPATIENT
Start: 2025-07-25

## 2025-07-25 RX ORDER — ALBUTEROL SULFATE 0.83 MG/ML
3 SOLUTION RESPIRATORY (INHALATION) AS NEEDED
Status: CANCELLED | OUTPATIENT
Start: 2025-07-25

## 2025-07-25 RX ORDER — HEPARIN SODIUM,PORCINE/PF 10 UNIT/ML
50 SYRINGE (ML) INTRAVENOUS AS NEEDED
OUTPATIENT
Start: 2025-07-25

## 2025-07-25 RX ORDER — FAMOTIDINE 10 MG/ML
20 INJECTION, SOLUTION INTRAVENOUS ONCE AS NEEDED
Status: CANCELLED | OUTPATIENT
Start: 2025-07-25

## 2025-07-25 RX ORDER — PROCHLORPERAZINE EDISYLATE 5 MG/ML
10 INJECTION INTRAMUSCULAR; INTRAVENOUS EVERY 6 HOURS PRN
Status: CANCELLED | OUTPATIENT
Start: 2025-07-25

## 2025-07-25 RX ORDER — DIPHENHYDRAMINE HYDROCHLORIDE 50 MG/ML
50 INJECTION, SOLUTION INTRAMUSCULAR; INTRAVENOUS AS NEEDED
Status: DISCONTINUED | OUTPATIENT
Start: 2025-07-25 | End: 2025-07-25 | Stop reason: HOSPADM

## 2025-07-25 RX ORDER — PROCHLORPERAZINE MALEATE 5 MG
10 TABLET ORAL EVERY 6 HOURS PRN
Status: CANCELLED | OUTPATIENT
Start: 2025-07-25

## 2025-07-25 RX ADMIN — SODIUM CHLORIDE 200 MG: 9 INJECTION, SOLUTION INTRAVENOUS at 12:44

## 2025-07-25 ASSESSMENT — PAIN SCALES - GENERAL: PAINLEVEL_OUTOF10: 0-NO PAIN

## 2025-07-25 NOTE — PROGRESS NOTES
Patient ID: Kika Marcelino is a 59 y.o. female.  Attending Physician: Dr. Cecilio Finnegan  Cancer Diagnosis:  Cancer Staging   Renal cell cancer, right (Multi)  Staging form: Kidney, AJCC 8th Edition  - Clinical stage from 10/9/2024: Stage III (ycT3a, cN0, cM0) - Signed by Gerard Nixon MD on 11/5/2024     Current Therapy: Adjuvant Pembrolizumab  200 mg IV day 1  21-day cycles    Genetics: N/A    Subjective      Cancer History:  Oncology History   Renal cell cancer, right (Multi)   8/13/2024 Initial Diagnosis    Renal cell cancer, right (Multi)     10/9/2024 Cancer Staged    Staging form: Kidney, AJCC 8th Edition, Clinical stage from 10/9/2024: Stage III (ycT3a, cN0, cM0) - Signed by Gerard Nixon MD on 11/5/2024     1/3/2025 -  Chemotherapy    Pembrolizumab, 21 Day Cycles       6/27/24 Admitted for COVID PNA, incidental renal mass found    6/28/24 CT kidney shows R renal lesion     10/1/24 MR kidney - 3.9 cm R renal mass     10/9/24 R partial nephrectomy - 3.9 cm ccRCC of R kidney, grade 3. Invade segmental vein. Margins negative. Staged [id number]     10/23/24 CT chest without metastasis, resolution of previously identified infiltrate    1/3/25  Cycle 1 Day 1 Adjuvant Pembrolizumab    1/24/25 Cycle 2 day 1 Pembro    2/14/25 Cycle 3 Pembro     3/7/25  Cycle 4 adjuvant pembro    3/28/25 Cycle 5 Pembro    4/18/25 Cycle 6 Pembro    5/9/25  Cycle 7 Pembro    5/30/25 Cycle 8 Pembro    7/3/25  Cycle 9 Pembro    7/25/25 Cycle 10 Pembro    Interval History:  Ms. Marcelino presents today in follow up and consideration of cycle 10. She feels well overall. Has noticed with this last cycle that her bilateral palms and soles of her feet have developed small bumps. They do itch, and then break out, and then resolve over time. No history of this. No other rashes elsewhere. She has had a couple episodes of feeling lightheaded with position changes. BP at home has been good otherwise and has been hydrated. No BP/SOB/cardiac symptoms  otherwise. She has not checked BP during episodes. The remainder of her ROS is otherwise negative.  HPI    Objective    BSA: There is no height or weight on file to calculate BSA.  LMP  (LMP Unknown) Comment: more than ten years ago    Physical Exam  General: alert, well-dressed in NAD. Speech is fluent and coherent, words clear. Good insight.   Skin: warm, dry, and pink without cyanosis or nail clubbing. No rash, petechiae, or ecchymoses  HEENT: Normocephalic atraumatic. Sclera white, conjunctiva pink. EOMs intact. Hearing intact to spoken voice. Oral mucosa pink. No visible lesions  Respiratory: Chest expansion symmetric. Breath sounds vesicular in all lobes without crackles, wheezes, or rhonchi bilaterally.  CV: S1S2 audible and crisp without murmurs, rubs, or extra sounds. RRR. Radial pulses strong and regular. Extremities warm and appropriate color for race. No edema bilaterally.  Lymph: no palpable cervical, submandibular, or supraclavicular lymphadenopathy.  GI: abdomen is round, soft, and non-tender. Active bowel sounds.   Psych: engaged, polite, appropriate conversation and eye contact.    Current Medications:  No current outpatient medications on file.     Most Recent Labs:  Results for orders placed or performed in visit on 07/03/25   CBC and Auto Differential    Collection Time: 07/03/25 11:29 AM   Result Value Ref Range    WBC 6.6 4.4 - 11.3 x10*3/uL    nRBC      RBC 5.23 (H) 4.00 - 5.20 x10*6/uL    Hemoglobin 14.7 12.0 - 16.0 g/dL    Hematocrit 44.6 36.0 - 46.0 %    MCV 85 80 - 100 fL    MCH 28.1 26.0 - 34.0 pg    MCHC 33.0 32.0 - 36.0 g/dL    RDW 13.7 11.5 - 14.5 %    Platelets 220 150 - 450 x10*3/uL    Neutrophils % 63.1 40.0 - 80.0 %    Immature Granulocytes %, Automated 0.5 0.0 - 0.9 %    Lymphocytes % 22.4 13.0 - 44.0 %    Monocytes % 7.6 2.0 - 10.0 %    Eosinophils % 5.5 0.0 - 6.0 %    Basophils % 0.9 0.0 - 2.0 %    Neutrophils Absolute 4.17 1.20 - 7.70 x10*3/uL    Immature Granulocytes Absolute,  "Automated 0.03 0.00 - 0.70 x10*3/uL    Lymphocytes Absolute 1.48 1.20 - 4.80 x10*3/uL    Monocytes Absolute 0.50 0.10 - 1.00 x10*3/uL    Eosinophils Absolute 0.36 0.00 - 0.70 x10*3/uL    Basophils Absolute 0.06 0.00 - 0.10 x10*3/uL   Comprehensive metabolic panel    Collection Time: 07/03/25 11:29 AM   Result Value Ref Range    Glucose 99 74 - 99 mg/dL    Sodium 139 136 - 145 mmol/L    Potassium 3.8 3.5 - 5.3 mmol/L    Chloride 104 98 - 107 mmol/L    Bicarbonate 29 21 - 32 mmol/L    Anion Gap 10 10 - 20 mmol/L    Urea Nitrogen 17 6 - 23 mg/dL    Creatinine 0.98 0.50 - 1.05 mg/dL    eGFR 67 >60 mL/min/1.73m*2    Calcium 9.6 8.6 - 10.3 mg/dL    Albumin 4.3 3.4 - 5.0 g/dL    Alkaline Phosphatase 51 33 - 110 U/L    Total Protein 7.0 6.4 - 8.2 g/dL    AST 20 9 - 39 U/L    Bilirubin, Total 0.8 0.0 - 1.2 mg/dL    ALT 10 7 - 45 U/L   Acth    Collection Time: 07/03/25 11:29 AM   Result Value Ref Range    Adrenocorticotropic Hormone (ACTH) 11.1 7.2 - 63.3 pg/mL   Cortisol Am    Collection Time: 07/03/25 11:29 AM   Result Value Ref Range    Cortisol  A.M. 12.3 5.0 - 20.0 ug/dL   Tsh With Reflex To Free T4 If Abnormal    Collection Time: 07/03/25 11:29 AM   Result Value Ref Range    Thyroid Stimulating Hormone 0.80 0.44 - 3.98 mIU/L      No results found for: \"PSA\"     Performance Status:  ECOG Score: 0- Fully active, able to carry on all pre-disease performance w/o restriction.  Karnofsky Score: 100 - Fully active, able to carry on all pre-disease performed without restriction      Assessment/Plan   Kika Marcelino is a 59 y.o. female with a history of RCC who presents in follow up on adjuvant pembrolizumab.     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 She started adjuvant pembrolizumab in January 2025.    She has not had evidence of recurrence on imaging. She has a mild palmar-plantar rash. We will treat with " kenalog cream covered by cotton gloves.    Concern for BP-related lightheadedness. Advised to check BP immediately if experiencing symptoms. ED for CP/SOB. I did send a message to notify her PCP of continued HTN here and symptoms as well.     #  Stage III clear cell renal cell carcinoma  - Follows with urology (Dr. Nixon)  - Proceed with cycle 10 adjuvant pembrolizumab  - Imaging with CT C/A/P after cycle 9, ordered though not scheduled until after this cycle    # HTN  - Notified PCP of continued HTN     # Tobacco Use  - Consider cessation    RTC 3 weeks with labs and scans prior    Total time spent on this encounter was 40 minutes, which included preparation, direct time with patient, documentation, and care coordination on the day of visit.    Savita Schreiber, MSN, APRN, AGNP-C, AOCNP  Associate Nurse Practitioner  [facility] [place], [facility]

## 2025-07-25 NOTE — SIGNIFICANT EVENT

## 2025-08-01 ENCOUNTER — APPOINTMENT (OUTPATIENT)
Dept: RADIOLOGY | Facility: HOSPITAL | Age: 60
End: 2025-08-01
Payer: COMMERCIAL

## 2025-08-05 ENCOUNTER — HOSPITAL ENCOUNTER (OUTPATIENT)
Dept: RADIOLOGY | Facility: HOSPITAL | Age: 60
Discharge: HOME | End: 2025-08-05
Payer: COMMERCIAL

## 2025-08-05 DIAGNOSIS — C64.1 RENAL CELL CANCER, RIGHT (MULTI): ICD-10-CM

## 2025-08-05 PROCEDURE — 71260 CT THORAX DX C+: CPT | Performed by: RADIOLOGY

## 2025-08-05 PROCEDURE — 74177 CT ABD & PELVIS W/CONTRAST: CPT | Performed by: RADIOLOGY

## 2025-08-05 PROCEDURE — 71260 CT THORAX DX C+: CPT

## 2025-08-05 PROCEDURE — 2550000001 HC RX 255 CONTRASTS: Performed by: NURSE PRACTITIONER

## 2025-08-05 RX ADMIN — IOHEXOL 75 ML: 350 INJECTION, SOLUTION INTRAVENOUS at 16:41

## 2025-08-15 ENCOUNTER — OFFICE VISIT (OUTPATIENT)
Dept: HEMATOLOGY/ONCOLOGY | Facility: CLINIC | Age: 60
End: 2025-08-15
Payer: COMMERCIAL

## 2025-08-15 ENCOUNTER — LAB (OUTPATIENT)
Dept: LAB | Facility: CLINIC | Age: 60
End: 2025-08-15
Payer: COMMERCIAL

## 2025-08-15 ENCOUNTER — INFUSION (OUTPATIENT)
Dept: HEMATOLOGY/ONCOLOGY | Facility: CLINIC | Age: 60
End: 2025-08-15
Payer: COMMERCIAL

## 2025-08-15 VITALS
WEIGHT: 214.5 LBS | RESPIRATION RATE: 16 BRPM | TEMPERATURE: 98.2 F | DIASTOLIC BLOOD PRESSURE: 105 MMHG | SYSTOLIC BLOOD PRESSURE: 157 MMHG | HEART RATE: 80 BPM | BODY MASS INDEX: 36.85 KG/M2 | OXYGEN SATURATION: 94 %

## 2025-08-15 DIAGNOSIS — C64.1 RENAL CELL CANCER, RIGHT (MULTI): ICD-10-CM

## 2025-08-15 DIAGNOSIS — C64.1 RENAL CELL CANCER, RIGHT (MULTI): Primary | ICD-10-CM

## 2025-08-15 LAB
ALBUMIN SERPL BCP-MCNC: 4.3 G/DL (ref 3.4–5)
ALP SERPL-CCNC: 53 U/L (ref 33–110)
ALT SERPL W P-5'-P-CCNC: 9 U/L (ref 7–45)
ANION GAP SERPL CALC-SCNC: 11 MMOL/L (ref 10–20)
AST SERPL W P-5'-P-CCNC: 18 U/L (ref 9–39)
BASOPHILS # BLD AUTO: 0.06 X10*3/UL (ref 0–0.1)
BASOPHILS NFR BLD AUTO: 0.9 %
BILIRUB SERPL-MCNC: 0.8 MG/DL (ref 0–1.2)
BUN SERPL-MCNC: 19 MG/DL (ref 6–23)
CALCIUM SERPL-MCNC: 9.8 MG/DL (ref 8.6–10.3)
CHLORIDE SERPL-SCNC: 103 MMOL/L (ref 98–107)
CO2 SERPL-SCNC: 26 MMOL/L (ref 21–32)
CREAT SERPL-MCNC: 1.05 MG/DL (ref 0.5–1.05)
EGFRCR SERPLBLD CKD-EPI 2021: 61 ML/MIN/1.73M*2
EOSINOPHIL # BLD AUTO: 0.31 X10*3/UL (ref 0–0.7)
EOSINOPHIL NFR BLD AUTO: 4.4 %
ERYTHROCYTE [DISTWIDTH] IN BLOOD BY AUTOMATED COUNT: 13.3 % (ref 11.5–14.5)
GLUCOSE SERPL-MCNC: 99 MG/DL (ref 74–99)
HCT VFR BLD AUTO: 45.2 % (ref 36–46)
HGB BLD-MCNC: 15 G/DL (ref 12–16)
IMM GRANULOCYTES # BLD AUTO: 0.03 X10*3/UL (ref 0–0.7)
IMM GRANULOCYTES NFR BLD AUTO: 0.4 % (ref 0–0.9)
LYMPHOCYTES # BLD AUTO: 1.58 X10*3/UL (ref 1.2–4.8)
LYMPHOCYTES NFR BLD AUTO: 22.7 %
MCH RBC QN AUTO: 28.1 PG (ref 26–34)
MCHC RBC AUTO-ENTMCNC: 33.2 G/DL (ref 32–36)
MCV RBC AUTO: 85 FL (ref 80–100)
MONOCYTES # BLD AUTO: 0.49 X10*3/UL (ref 0.1–1)
MONOCYTES NFR BLD AUTO: 7 %
NEUTROPHILS # BLD AUTO: 4.5 X10*3/UL (ref 1.2–7.7)
NEUTROPHILS NFR BLD AUTO: 64.6 %
NRBC BLD-RTO: ABNORMAL /100{WBCS}
PLATELET # BLD AUTO: 231 X10*3/UL (ref 150–450)
POTASSIUM SERPL-SCNC: 4.2 MMOL/L (ref 3.5–5.3)
PROT SERPL-MCNC: 7 G/DL (ref 6.4–8.2)
RBC # BLD AUTO: 5.34 X10*6/UL (ref 4–5.2)
SODIUM SERPL-SCNC: 136 MMOL/L (ref 136–145)
WBC # BLD AUTO: 7 X10*3/UL (ref 4.4–11.3)

## 2025-08-15 PROCEDURE — 82533 TOTAL CORTISOL: CPT

## 2025-08-15 PROCEDURE — 99215 OFFICE O/P EST HI 40 MIN: CPT | Performed by: NURSE PRACTITIONER

## 2025-08-15 PROCEDURE — 36415 COLL VENOUS BLD VENIPUNCTURE: CPT

## 2025-08-15 PROCEDURE — 3080F DIAST BP >= 90 MM HG: CPT | Performed by: NURSE PRACTITIONER

## 2025-08-15 PROCEDURE — 2500000004 HC RX 250 GENERAL PHARMACY W/ HCPCS (ALT 636 FOR OP/ED): Mod: JZ,TB | Performed by: NURSE PRACTITIONER

## 2025-08-15 PROCEDURE — 82024 ASSAY OF ACTH: CPT

## 2025-08-15 PROCEDURE — 99215 OFFICE O/P EST HI 40 MIN: CPT | Mod: 25 | Performed by: NURSE PRACTITIONER

## 2025-08-15 PROCEDURE — 85025 COMPLETE CBC W/AUTO DIFF WBC: CPT

## 2025-08-15 PROCEDURE — 3077F SYST BP >= 140 MM HG: CPT | Performed by: NURSE PRACTITIONER

## 2025-08-15 PROCEDURE — 80053 COMPREHEN METABOLIC PANEL: CPT

## 2025-08-15 PROCEDURE — 96413 CHEMO IV INFUSION 1 HR: CPT

## 2025-08-15 PROCEDURE — 84443 ASSAY THYROID STIM HORMONE: CPT

## 2025-08-15 RX ORDER — HEPARIN 100 UNIT/ML
500 SYRINGE INTRAVENOUS AS NEEDED
OUTPATIENT
Start: 2025-08-15

## 2025-08-15 RX ORDER — DIPHENHYDRAMINE HYDROCHLORIDE 50 MG/ML
50 INJECTION, SOLUTION INTRAMUSCULAR; INTRAVENOUS AS NEEDED
Status: DISCONTINUED | OUTPATIENT
Start: 2025-08-15 | End: 2025-08-15 | Stop reason: HOSPADM

## 2025-08-15 RX ORDER — PROCHLORPERAZINE EDISYLATE 5 MG/ML
10 INJECTION INTRAMUSCULAR; INTRAVENOUS EVERY 6 HOURS PRN
Status: DISCONTINUED | OUTPATIENT
Start: 2025-08-15 | End: 2025-08-15 | Stop reason: HOSPADM

## 2025-08-15 RX ORDER — PROCHLORPERAZINE MALEATE 10 MG
10 TABLET ORAL EVERY 6 HOURS PRN
Status: DISCONTINUED | OUTPATIENT
Start: 2025-08-15 | End: 2025-08-15 | Stop reason: HOSPADM

## 2025-08-15 RX ORDER — PROCHLORPERAZINE MALEATE 5 MG
10 TABLET ORAL EVERY 6 HOURS PRN
Status: CANCELLED | OUTPATIENT
Start: 2025-08-15

## 2025-08-15 RX ORDER — HEPARIN SODIUM,PORCINE/PF 10 UNIT/ML
50 SYRINGE (ML) INTRAVENOUS AS NEEDED
OUTPATIENT
Start: 2025-08-15

## 2025-08-15 RX ORDER — EPINEPHRINE 0.3 MG/.3ML
0.3 INJECTION SUBCUTANEOUS EVERY 5 MIN PRN
Status: DISCONTINUED | OUTPATIENT
Start: 2025-08-15 | End: 2025-08-15 | Stop reason: HOSPADM

## 2025-08-15 RX ORDER — FAMOTIDINE 10 MG/ML
20 INJECTION, SOLUTION INTRAVENOUS ONCE AS NEEDED
Status: CANCELLED | OUTPATIENT
Start: 2025-08-15

## 2025-08-15 RX ORDER — EPINEPHRINE 0.3 MG/.3ML
0.3 INJECTION SUBCUTANEOUS EVERY 5 MIN PRN
Status: CANCELLED | OUTPATIENT
Start: 2025-08-15

## 2025-08-15 RX ORDER — ALBUTEROL SULFATE 0.83 MG/ML
3 SOLUTION RESPIRATORY (INHALATION) AS NEEDED
Status: DISCONTINUED | OUTPATIENT
Start: 2025-08-15 | End: 2025-08-15 | Stop reason: HOSPADM

## 2025-08-15 RX ORDER — ALBUTEROL SULFATE 0.83 MG/ML
3 SOLUTION RESPIRATORY (INHALATION) AS NEEDED
Status: CANCELLED | OUTPATIENT
Start: 2025-08-15

## 2025-08-15 RX ORDER — DIPHENHYDRAMINE HYDROCHLORIDE 50 MG/ML
50 INJECTION, SOLUTION INTRAMUSCULAR; INTRAVENOUS AS NEEDED
Status: CANCELLED | OUTPATIENT
Start: 2025-08-15

## 2025-08-15 RX ORDER — FAMOTIDINE 10 MG/ML
20 INJECTION, SOLUTION INTRAVENOUS ONCE AS NEEDED
Status: DISCONTINUED | OUTPATIENT
Start: 2025-08-15 | End: 2025-08-15 | Stop reason: HOSPADM

## 2025-08-15 RX ORDER — PROCHLORPERAZINE EDISYLATE 5 MG/ML
10 INJECTION INTRAMUSCULAR; INTRAVENOUS EVERY 6 HOURS PRN
Status: CANCELLED | OUTPATIENT
Start: 2025-08-15

## 2025-08-15 RX ADMIN — SODIUM CHLORIDE 200 MG: 9 INJECTION, SOLUTION INTRAVENOUS at 15:31

## 2025-08-15 ASSESSMENT — PAIN SCALES - GENERAL: PAINLEVEL_OUTOF10: 0-NO PAIN

## 2025-08-16 LAB
CORTIS AM PEAK SERPL-MCNC: 10.6 UG/DL (ref 5–20)
TSH SERPL-ACNC: 1.19 MIU/L (ref 0.44–3.98)

## 2025-08-18 LAB — ACTH PLAS-MCNC: 11.8 PG/ML (ref 7.2–63.3)

## 2025-08-19 DIAGNOSIS — Z12.31 ENCOUNTER FOR SCREENING MAMMOGRAM FOR BREAST CANCER: ICD-10-CM

## 2025-08-25 ENCOUNTER — TELEPHONE (OUTPATIENT)
Dept: PRIMARY CARE | Facility: CLINIC | Age: 60
End: 2025-08-25

## 2025-08-25 ENCOUNTER — APPOINTMENT (OUTPATIENT)
Dept: PRIMARY CARE | Facility: CLINIC | Age: 60
End: 2025-08-25
Payer: COMMERCIAL

## 2025-08-25 ENCOUNTER — APPOINTMENT (OUTPATIENT)
Dept: UROLOGY | Facility: CLINIC | Age: 60
End: 2025-08-25
Payer: COMMERCIAL

## 2025-08-25 DIAGNOSIS — C64.1 RENAL CELL CANCER, RIGHT (MULTI): Primary | ICD-10-CM

## 2025-08-25 PROCEDURE — 99214 OFFICE O/P EST MOD 30 MIN: CPT | Performed by: UROLOGY

## 2025-09-03 RX ORDER — FAMOTIDINE 10 MG/ML
20 INJECTION, SOLUTION INTRAVENOUS ONCE AS NEEDED
Status: CANCELLED | OUTPATIENT
Start: 2025-09-05

## 2025-09-03 RX ORDER — ALBUTEROL SULFATE 0.83 MG/ML
3 SOLUTION RESPIRATORY (INHALATION) AS NEEDED
Status: CANCELLED | OUTPATIENT
Start: 2025-09-05

## 2025-09-03 RX ORDER — PROCHLORPERAZINE EDISYLATE 5 MG/ML
10 INJECTION INTRAMUSCULAR; INTRAVENOUS EVERY 6 HOURS PRN
Status: CANCELLED | OUTPATIENT
Start: 2025-09-05

## 2025-09-03 RX ORDER — DIPHENHYDRAMINE HYDROCHLORIDE 50 MG/ML
50 INJECTION, SOLUTION INTRAMUSCULAR; INTRAVENOUS AS NEEDED
Status: CANCELLED | OUTPATIENT
Start: 2025-09-05

## 2025-09-03 RX ORDER — EPINEPHRINE 0.3 MG/.3ML
0.3 INJECTION SUBCUTANEOUS EVERY 5 MIN PRN
Status: CANCELLED | OUTPATIENT
Start: 2025-09-05

## 2025-09-03 RX ORDER — PROCHLORPERAZINE MALEATE 5 MG
10 TABLET ORAL EVERY 6 HOURS PRN
Status: CANCELLED | OUTPATIENT
Start: 2025-09-05

## 2025-09-04 ENCOUNTER — OFFICE VISIT (OUTPATIENT)
Dept: HEMATOLOGY/ONCOLOGY | Facility: CLINIC | Age: 60
End: 2025-09-04
Payer: COMMERCIAL

## 2025-09-04 ENCOUNTER — APPOINTMENT (OUTPATIENT)
Dept: HEMATOLOGY/ONCOLOGY | Facility: CLINIC | Age: 60
End: 2025-09-04
Payer: COMMERCIAL

## 2025-09-04 ENCOUNTER — LAB (OUTPATIENT)
Dept: LAB | Facility: CLINIC | Age: 60
End: 2025-09-04
Payer: COMMERCIAL

## 2025-09-04 VITALS
TEMPERATURE: 98.2 F | DIASTOLIC BLOOD PRESSURE: 96 MMHG | RESPIRATION RATE: 16 BRPM | OXYGEN SATURATION: 95 % | HEART RATE: 77 BPM | BODY MASS INDEX: 37 KG/M2 | SYSTOLIC BLOOD PRESSURE: 166 MMHG | WEIGHT: 215.4 LBS

## 2025-09-04 DIAGNOSIS — I10 PRIMARY HYPERTENSION: ICD-10-CM

## 2025-09-04 DIAGNOSIS — Z72.0 TOBACCO USE: ICD-10-CM

## 2025-09-04 DIAGNOSIS — C64.1 RENAL CELL CANCER, RIGHT (MULTI): ICD-10-CM

## 2025-09-04 DIAGNOSIS — Z51.12 ENCOUNTER FOR ANTINEOPLASTIC IMMUNOTHERAPY: ICD-10-CM

## 2025-09-04 DIAGNOSIS — L27.0 DRUG RASH: ICD-10-CM

## 2025-09-04 DIAGNOSIS — C64.1 RENAL CELL CANCER, RIGHT (MULTI): Primary | ICD-10-CM

## 2025-09-04 LAB
ALBUMIN SERPL BCP-MCNC: 4.1 G/DL (ref 3.4–5)
ALP SERPL-CCNC: 53 U/L (ref 33–110)
ALT SERPL W P-5'-P-CCNC: 9 U/L (ref 7–45)
ANION GAP SERPL CALC-SCNC: 11 MMOL/L (ref 10–20)
AST SERPL W P-5'-P-CCNC: 16 U/L (ref 9–39)
BASOPHILS # BLD AUTO: 0.05 X10*3/UL (ref 0–0.1)
BASOPHILS NFR BLD AUTO: 0.7 %
BILIRUB SERPL-MCNC: 0.7 MG/DL (ref 0–1.2)
BUN SERPL-MCNC: 19 MG/DL (ref 6–23)
CALCIUM SERPL-MCNC: 9.3 MG/DL (ref 8.6–10.3)
CHLORIDE SERPL-SCNC: 104 MMOL/L (ref 98–107)
CO2 SERPL-SCNC: 28 MMOL/L (ref 21–32)
CREAT SERPL-MCNC: 1.02 MG/DL (ref 0.5–1.05)
EGFRCR SERPLBLD CKD-EPI 2021: 64 ML/MIN/1.73M*2
EOSINOPHIL # BLD AUTO: 0.33 X10*3/UL (ref 0–0.7)
EOSINOPHIL NFR BLD AUTO: 4.9 %
ERYTHROCYTE [DISTWIDTH] IN BLOOD BY AUTOMATED COUNT: 13.5 % (ref 11.5–14.5)
GLUCOSE SERPL-MCNC: 98 MG/DL (ref 74–99)
HCT VFR BLD AUTO: 44.5 % (ref 36–46)
HGB BLD-MCNC: 14.5 G/DL (ref 12–16)
IMM GRANULOCYTES # BLD AUTO: 0.02 X10*3/UL (ref 0–0.7)
IMM GRANULOCYTES NFR BLD AUTO: 0.3 % (ref 0–0.9)
LYMPHOCYTES # BLD AUTO: 1.59 X10*3/UL (ref 1.2–4.8)
LYMPHOCYTES NFR BLD AUTO: 23.7 %
MCH RBC QN AUTO: 27.8 PG (ref 26–34)
MCHC RBC AUTO-ENTMCNC: 32.6 G/DL (ref 32–36)
MCV RBC AUTO: 85 FL (ref 80–100)
MONOCYTES # BLD AUTO: 0.49 X10*3/UL (ref 0.1–1)
MONOCYTES NFR BLD AUTO: 7.3 %
NEUTROPHILS # BLD AUTO: 4.23 X10*3/UL (ref 1.2–7.7)
NEUTROPHILS NFR BLD AUTO: 63.1 %
PLATELET # BLD AUTO: 193 X10*3/UL (ref 150–450)
POTASSIUM SERPL-SCNC: 3.8 MMOL/L (ref 3.5–5.3)
PROT SERPL-MCNC: 6.8 G/DL (ref 6.4–8.2)
RBC # BLD AUTO: 5.22 X10*6/UL (ref 4–5.2)
SODIUM SERPL-SCNC: 139 MMOL/L (ref 136–145)
WBC # BLD AUTO: 6.7 X10*3/UL (ref 4.4–11.3)

## 2025-09-04 PROCEDURE — 85025 COMPLETE CBC W/AUTO DIFF WBC: CPT

## 2025-09-04 PROCEDURE — 99215 OFFICE O/P EST HI 40 MIN: CPT | Performed by: STUDENT IN AN ORGANIZED HEALTH CARE EDUCATION/TRAINING PROGRAM

## 2025-09-04 PROCEDURE — 36415 COLL VENOUS BLD VENIPUNCTURE: CPT

## 2025-09-04 PROCEDURE — 84075 ASSAY ALKALINE PHOSPHATASE: CPT

## 2025-09-04 RX ORDER — EPINEPHRINE 0.3 MG/.3ML
0.3 INJECTION SUBCUTANEOUS EVERY 5 MIN PRN
OUTPATIENT
Start: 2025-09-26

## 2025-09-04 RX ORDER — DIPHENHYDRAMINE HYDROCHLORIDE 50 MG/ML
50 INJECTION, SOLUTION INTRAMUSCULAR; INTRAVENOUS AS NEEDED
OUTPATIENT
Start: 2025-09-26

## 2025-09-04 RX ORDER — PROCHLORPERAZINE EDISYLATE 5 MG/ML
10 INJECTION INTRAMUSCULAR; INTRAVENOUS EVERY 6 HOURS PRN
OUTPATIENT
Start: 2025-09-26

## 2025-09-04 RX ORDER — PROCHLORPERAZINE MALEATE 5 MG
10 TABLET ORAL EVERY 6 HOURS PRN
OUTPATIENT
Start: 2025-09-26

## 2025-09-04 RX ORDER — FAMOTIDINE 10 MG/ML
20 INJECTION, SOLUTION INTRAVENOUS ONCE AS NEEDED
OUTPATIENT
Start: 2025-09-26

## 2025-09-04 RX ORDER — ALBUTEROL SULFATE 0.83 MG/ML
3 SOLUTION RESPIRATORY (INHALATION) AS NEEDED
OUTPATIENT
Start: 2025-09-26

## 2025-09-04 ASSESSMENT — PAIN SCALES - GENERAL: PAINLEVEL_OUTOF10: 0-NO PAIN

## 2025-09-05 ENCOUNTER — INFUSION (OUTPATIENT)
Dept: HEMATOLOGY/ONCOLOGY | Facility: CLINIC | Age: 60
End: 2025-09-05
Payer: COMMERCIAL

## 2025-09-05 ENCOUNTER — APPOINTMENT (OUTPATIENT)
Dept: HEMATOLOGY/ONCOLOGY | Facility: CLINIC | Age: 60
End: 2025-09-05
Payer: COMMERCIAL

## 2025-09-05 VITALS
TEMPERATURE: 98.2 F | SYSTOLIC BLOOD PRESSURE: 163 MMHG | RESPIRATION RATE: 16 BRPM | DIASTOLIC BLOOD PRESSURE: 113 MMHG | HEART RATE: 75 BPM | BODY MASS INDEX: 37.17 KG/M2 | OXYGEN SATURATION: 95 % | WEIGHT: 216.38 LBS

## 2025-09-05 DIAGNOSIS — C64.1 RENAL CELL CANCER, RIGHT (MULTI): ICD-10-CM

## 2025-09-05 PROCEDURE — 96413 CHEMO IV INFUSION 1 HR: CPT

## 2025-09-05 PROCEDURE — 2500000004 HC RX 250 GENERAL PHARMACY W/ HCPCS (ALT 636 FOR OP/ED): Mod: JZ,TB | Performed by: STUDENT IN AN ORGANIZED HEALTH CARE EDUCATION/TRAINING PROGRAM

## 2025-09-05 RX ORDER — HEPARIN SODIUM,PORCINE/PF 10 UNIT/ML
50 SYRINGE (ML) INTRAVENOUS AS NEEDED
OUTPATIENT
Start: 2025-09-05

## 2025-09-05 RX ORDER — FAMOTIDINE 10 MG/ML
20 INJECTION, SOLUTION INTRAVENOUS ONCE AS NEEDED
Status: DISCONTINUED | OUTPATIENT
Start: 2025-09-05 | End: 2025-09-05 | Stop reason: HOSPADM

## 2025-09-05 RX ORDER — HEPARIN 100 UNIT/ML
500 SYRINGE INTRAVENOUS AS NEEDED
OUTPATIENT
Start: 2025-09-05

## 2025-09-05 RX ORDER — PROCHLORPERAZINE EDISYLATE 5 MG/ML
10 INJECTION INTRAMUSCULAR; INTRAVENOUS EVERY 6 HOURS PRN
Status: DISCONTINUED | OUTPATIENT
Start: 2025-09-05 | End: 2025-09-05 | Stop reason: HOSPADM

## 2025-09-05 RX ORDER — ALBUTEROL SULFATE 0.83 MG/ML
3 SOLUTION RESPIRATORY (INHALATION) AS NEEDED
Status: DISCONTINUED | OUTPATIENT
Start: 2025-09-05 | End: 2025-09-05 | Stop reason: HOSPADM

## 2025-09-05 RX ORDER — DIPHENHYDRAMINE HYDROCHLORIDE 50 MG/ML
50 INJECTION, SOLUTION INTRAMUSCULAR; INTRAVENOUS AS NEEDED
Status: DISCONTINUED | OUTPATIENT
Start: 2025-09-05 | End: 2025-09-05 | Stop reason: HOSPADM

## 2025-09-05 RX ORDER — PROCHLORPERAZINE MALEATE 10 MG
10 TABLET ORAL EVERY 6 HOURS PRN
Status: DISCONTINUED | OUTPATIENT
Start: 2025-09-05 | End: 2025-09-05 | Stop reason: HOSPADM

## 2025-09-05 RX ORDER — EPINEPHRINE 0.3 MG/.3ML
0.3 INJECTION SUBCUTANEOUS EVERY 5 MIN PRN
Status: DISCONTINUED | OUTPATIENT
Start: 2025-09-05 | End: 2025-09-05 | Stop reason: HOSPADM

## 2025-09-05 RX ADMIN — SODIUM CHLORIDE 200 MG: 9 INJECTION, SOLUTION INTRAVENOUS at 15:13

## 2025-09-05 ASSESSMENT — PAIN SCALES - GENERAL: PAINLEVEL_OUTOF10: 0-NO PAIN

## (undated) DEVICE — NEEDLE, INSUFFLATION, 13GAX120MM, DISP

## (undated) DEVICE — SCISSORS, METZ, CURVED, 3/4 BLADE

## (undated) DEVICE — RETRIEVAL SYSTEM, MONARCH, 10MM DISP ENDOSCOPIC

## (undated) DEVICE — DRAPE, ARM XI

## (undated) DEVICE — TROCAR, KII OPTICAL BLADELESS 5MM Z THREAD 100MM LNGTH

## (undated) DEVICE — GOWN, ASTOUND, XL

## (undated) DEVICE — APPLICATOR, ENDOSCOPIC FLOSEAL

## (undated) DEVICE — CARE KIT, LAPAROSCOPIC, ADVANCED

## (undated) DEVICE — DRAIN, WOUND, FLAT, HUBLESS, FULL LENGTH PERFORATION, 10 MM X 20 CM, SILICONE

## (undated) DEVICE — ADHESIVE, SKIN, DERMABOND ADVANCED, 15CM, PEN-STYLE

## (undated) DEVICE — SOLUTION, IRRIGATION, STERILE WATER, 1000 ML, POUR BOTTLE

## (undated) DEVICE — TUBING SET, TRI-LUMEN, FILTERED, F/AIRSEAL

## (undated) DEVICE — ENDO BABCOCKS, 5MM

## (undated) DEVICE — SUTURE, VICRYL, 3-0, 27 IN, SH

## (undated) DEVICE — SUTURE, VICRYL, 0, 27 IN, UR-6, VIOLET

## (undated) DEVICE — TRAY, SURESTEP, SILICONE DRAINAGE BAG, STATLOCK, 16FR

## (undated) DEVICE — APPLICATOR, CHLORAPREP, W/ORANGE TINT, 26ML

## (undated) DEVICE — DRAPE, COLUMN, DAVINCI XI

## (undated) DEVICE — SOLUTION, IRRIGATION, SODIUM CHLORIDE 0.9%, 1000 ML, POUR BOTTLE

## (undated) DEVICE — OBTURATOR, BLADELESS , SU

## (undated) DEVICE — SEAL, UNIVERSAL, 5-12MM

## (undated) DEVICE — SUTURE, V-LOC 90, 3-0, CV-20, VIOLET

## (undated) DEVICE — SUTURE, MONOCRYL, 4-0, 27 IN, PS-2, UNDYED

## (undated) DEVICE — DEVICE, OMNICLOSE, 10MM

## (undated) DEVICE — DRESSING, GAUZE, DRAIN SPONGE, 6 PLY, EXCILON, 4 X 4 IN, STERILE

## (undated) DEVICE — GLOVE, SURGICAL, PROTEXIS PI BLUE W/NEUTHERA, 8.0, PF, LF

## (undated) DEVICE — ACCESS PORT, 12MM, 100MM LENGTH, LOW PROFILE W/BLADELESS OPTICAL TIP

## (undated) DEVICE — CLIP, LIGATING, HEM-O-LOCK, MLX, LARGE, LF, PURPLE

## (undated) DEVICE — POSITIONING, THE PINK PAD, PIGAZZI SYSTEM

## (undated) DEVICE — COVER, TIP HOT SHEARS ENDOWRIST

## (undated) DEVICE — Device

## (undated) DEVICE — SUTURE, PDS II, 1, 27 IN, CT-1, VIOLET

## (undated) DEVICE — TOWEL PACK, STERILE, 4/PACK, BLUE

## (undated) DEVICE — GOWN, SURGICAL, SMARTGOWN, XX-LARGE, STERILE

## (undated) DEVICE — SUTURE, V-LOC, 2-0, 12IN, GS-21, GR 180 ABS

## (undated) DEVICE — SPONGE, HEMOSTAT, SURGICEL FIBRILLAR, ABS, 4 X 4, LF

## (undated) DEVICE — SEALANT, HEMOSTATIC, FLOSEAL, 10 ML

## (undated) DEVICE — ASSEMBLY, STRYKER FLOW 2, SUCTION IRRIGATOR, WITH TIP